# Patient Record
Sex: FEMALE | Race: WHITE | NOT HISPANIC OR LATINO | Employment: UNEMPLOYED | URBAN - METROPOLITAN AREA
[De-identification: names, ages, dates, MRNs, and addresses within clinical notes are randomized per-mention and may not be internally consistent; named-entity substitution may affect disease eponyms.]

---

## 2017-01-04 ENCOUNTER — ALLSCRIPTS OFFICE VISIT (OUTPATIENT)
Dept: OTHER | Facility: OTHER | Age: 67
End: 2017-01-04

## 2017-01-04 DIAGNOSIS — E78.00 PURE HYPERCHOLESTEROLEMIA: ICD-10-CM

## 2017-09-11 ENCOUNTER — GENERIC CONVERSION - ENCOUNTER (OUTPATIENT)
Dept: OTHER | Facility: OTHER | Age: 67
End: 2017-09-11

## 2018-01-02 ENCOUNTER — ALLSCRIPTS OFFICE VISIT (OUTPATIENT)
Dept: OTHER | Facility: OTHER | Age: 68
End: 2018-01-02

## 2018-01-03 NOTE — PROGRESS NOTES
Assessment   1  High cholesterol (272 0) (E78 00)   2  Benign essential hypertension (401 1) (I10)   3  Backache (724 5) (M54 9)   4  Pure hypercholesterolemia (272 0) (E78 00)   5  Insomnia (780 52) (G47 00)   6  Fatigue (780 79) (R53 83)   7  Localized papular rash (782 1) (R21)    Plan   Benign essential hypertension    · (1) BASIC METABOLIC PROFILE; Status:Active; Requested GRK:[id number];    · (1) CBC/ PLT (NO DIFF); Status:Active; Requested URX:[id number];    · (1) LIPID PANEL, FASTING; Status:Active; Requested QVT:[id number];    · (1) URINALYSIS (will reflex a microscopy if leukocytes, occult blood, protein or nitrites are not within    normal limits); Status:Active; Requested JPQ:[id number]; High cholesterol    · (1) HEPATIC FUNCTION PANEL; Status:Active; Requested VUA:[id number];    · (1) TSH; Status:Active; Requested OLS:[id number];    · Follow-up visit in 6 months Evaluation and Treatment  Follow-up  Status: Hold For - Scheduling     Requested for: 02Jan2018  Localized papular rash    · 1 - Todd MATIAS, Michelle Velasquez  (Dermatology) Co-Management  *  Status: Hold For - Scheduling  Requested    for: 63GFQ8250  Care Summary provided  : Yes    Discussion/Summary   Discussion Summary:    Hypertension: Very well control   rash: Erythematous papular rash not show worse etiology it may be but bite but is going on since October is not itchy not spreading side on think so  It is get worse with stress and sunlight maybe it is Rosea  I will refer her to get dermatologist for further evaluation   Did not have blood work which she will get it this week   of tiredness again etiology unclear I will wait for the blood work to come back before I make any other assessment   will see her back in 6 month  Counseling Documentation With Imm: The patient was counseled regarding diagnostic results,-- instructions for management,-- risk factor reductions,-- prognosis      Medication SE Review and Pt Understands Tx: Possible side effects of new medications were reviewed with the patient/guardian today  Chief Complaint   Chief Complaint Free Text Note Form: Patt Renteria came in today for comprehensive physical examination    Chief Complaint Chronic Condition [place] [facility]: Patient is here today for follow up of chronic conditions described in HPI  History of Present Illness   HPI: 48-year-old woman with stable medical problem hypertension and high cholesterol and back pain  She has had a rash on her face which is going on since October  He does get worse with the stress  Not eating not painful not spreading and it is only on face not any other part of the body  is also complaining of feeling quite tired with something new does not know the reason no change in her medication or anything else      Review of Systems   Complete-Female:      Constitutional: feeling tired  Eyes: No complaints of eye pain, no red eyes, no eyesight problems, no discharge, no dry eyes, no itching of eyes  ENT: no complaints of earache, no loss of hearing, no nose bleeds, no nasal discharge, no sore throat, no hoarseness  Cardiovascular: No complaints of slow heart rate, no fast heart rate, no chest pain, no palpitations, no leg claudication, no lower extremity edema  Respiratory: No complaints of shortness of breath, no wheezing, no cough, no SOB on exertion, no orthopnea, no PND  Gastrointestinal: No complaints of abdominal pain, no constipation, no nausea or vomiting, no diarrhea, no bloody stools  Genitourinary: No complaints of dysuria, no incontinence, no pelvic pain, no dysmenorrhea, no vaginal discharge or bleeding  Musculoskeletal: arthralgias-- and-- myalgias  Integumentary: No complaints of skin rash or lesions, no itching, no skin wounds, no breast pain or lump        Neurological: No complaints of headache, no confusion, no convulsions, no numbness, no dizziness or fainting, no tingling, no limb weakness, no difficulty walking  Psychiatric: Not suicidal, no sleep disturbance, no anxiety or depression, no change in personality, no emotional problems  Endocrine: No complaints of proptosis, no hot flashes, no muscle weakness, no deepening of the voice, no feelings of weakness  Hematologic/Lymphatic: No complaints of swollen glands, no swollen glands in the neck, does not bleed easily, does not bruise easily  ROS Reviewed:    ROS reviewed  Active Problems   1  Backache (724 5) (M54 9)   2  Benign essential hypertension (401 1) (I10)   3  Cough (786 2) (R05)   4  Depressive disorder (311) (F32 9)   5  Disorder of lung (518 89) (J98 4)   6  Dizziness (780 4) (R42)   7  High cholesterol (272 0) (E78 00)   8  Hip pain (719 45) (M25 559)   9  Insomnia (780 52) (G47 00)   10  Migraine (346 90) (G43 909)   11  Palpitations (785 1) (R00 2)   12  Pure hypercholesterolemia (272 0) (E78 00)   13  Viral disease (079 99) (B34 9)    Past Medical History   Active Problems And Past Medical History Reviewed: The active problems and past medical history were reviewed and updated today  Surgical History   1  History of Cholecystectomy   2  History of Tubal Ligation  Surgical History Reviewed: The surgical history was reviewed and updated today  Family History   Family History    1  Family history of Malignant neoplastic disease  Family History Reviewed: The family history was reviewed and updated today  Social History    · Advance directive information unavailable   · Does not exercise (V69 0) (Z72 3)   · Five children   ·    · Never a smoker   · Never used chewing tobacco (V49 89) (Z78 9)   · Non drinker / no alcohol use   · Occasional caffeine consumption   · Retired  Social History Reviewed: The social history was reviewed and updated today  Current Meds    1  Atorvastatin Calcium 40 MG Oral Tablet; TAKE 1 TABLET AT BEDTIME;      Therapy: 76KFT9923 to (Evaluate:18Mar2018)  Requested for: 84XDQ3621; Last Rx:[id number]     Ordered   2  PARoxetine HCl - 20 MG Oral Tablet; TAKE 1 TABLET DAILY  Requested for: 71Kvg4304; Last     Rx:[id number] Ordered   3  Tretinoin 0 05 % External Cream;     Therapy: (Recorded:14Jun2016) to Recorded   4  Zolpidem Tartrate 10 MG Oral Tablet; TAKE 1 TABLET AT BEDTIME AS NEEDED; Therapy: 42MAU8567 to (Evaluate:12Jan2018); Last Rx:13Nov2017 Ordered    Allergies   1  No Known Drug Allergies    Vitals   Vital Signs    Recorded: 02Jan2018 03:19PM   Heart Rate 80, Apical   Pulse Quality Normal, Apical   Respiration Quality Normal   Respiration 16   Systolic 229, LUE, Sitting   Diastolic 70, LUE, Sitting     Physical Exam        Constitutional      General appearance: No acute distress, well appearing and well nourished  Eyes      Conjunctiva and lids: No swelling, erythema or discharge  Pupils and irises: Equal, round and reactive to light  Ears, Nose, Mouth, and Throat      External inspection of ears and nose: Normal        Otoscopic examination: Tympanic membranes translucent with normal light reflex  Canals patent without erythema  Nasal mucosa, septum, and turbinates: Normal without edema or erythema  Oropharynx: Normal with no erythema, edema, exudate or lesions  Pulmonary      Respiratory effort: No increased work of breathing or signs of respiratory distress  Auscultation of lungs: Clear to auscultation  Cardiovascular      Auscultation of heart: Normal rate and rhythm, normal S1 and S2, without murmurs  Examination of extremities for edema and/or varicosities: Normal        Carotid pulses: Normal        Abdomen      Abdomen: Non-tender, no masses  Liver and spleen: No hepatomegaly or splenomegaly  Lymphatic      Palpation of lymph nodes in neck: No lymphadenopathy  Musculoskeletal      Gait and station: Normal        Digits and nails: Normal without clubbing or cyanosis         Skin      Skin and subcutaneous tissue: Abnormal   erythema  Neurologic      Cranial nerves: Cranial nerves 2-12 intact  Sensation: No sensory loss         Psychiatric      Orientation to person, place, and time: Normal        Mood and affect: Normal           Signatures    Electronically signed by : VAMSI Madsen ; Jan 2 2018  3:28PM EST                       (Author)

## 2018-01-15 VITALS
DIASTOLIC BLOOD PRESSURE: 80 MMHG | WEIGHT: 179.38 LBS | HEART RATE: 78 BPM | RESPIRATION RATE: 16 BRPM | SYSTOLIC BLOOD PRESSURE: 140 MMHG | BODY MASS INDEX: 27.19 KG/M2 | HEIGHT: 68 IN | TEMPERATURE: 98 F

## 2018-01-15 NOTE — MISCELLANEOUS
Message  a refill was sent to [facility] pharmacy for Zolpidem tartrate 10 mg oral tabs    cp 9/11/2017      Active Problems    1  Backache (724 5) (M54 9)   2  Benign essential hypertension (401 1) (I10)   3  Cough (786 2) (R05)   4  Depressive disorder (311) (F32 9)   5  Disorder of lung (518 89) (J98 4)   6  Dizziness (780 4) (R42)   7  Hip pain (719 45) (M25 559)   8  Insomnia (780 52) (G47 00)   9  Migraine (346 90) (G43 909)   10  Palpitations (785 1) (R00 2)   11  Pure hypercholesterolemia (272 0) (E78 00)   12  Viral disease (079 99) (B34 9)    Current Meds   1  Atorvastatin Calcium 20 MG Oral Tablet; Take 1 tablet daily by mouth at night; Therapy: 42GZX0416 to (Evaluate:28Mar2017)  Requested for: 45Oyl7554; Last   Rx:00Ami7919 Ordered   2  Atorvastatin Calcium 40 MG Oral Tablet; TAKE 1 TABLET AT BEDTIME; Therapy: 85PAU2356 to (Evaluate:09Egm0747)  Requested for: 65WHB2527; Last   Rx:05Jun2017 Ordered   3  PARoxetine HCl - 20 MG Oral Tablet; TAKE 1 TABLET DAILY  Requested for: 46Giz1201;   Last Rx:98Tog2023 Ordered   4  Tretinoin 0 05 % External Cream;   Therapy: (Recorded:14Jun2016) to Recorded   5  Zolpidem Tartrate 10 MG Oral Tablet; TAKE 1 TABLET AT BEDTIME AS NEEDED; Therapy: 38OMT5412 to (Evaluate:07Nov2017); Last Rx:25Coi8255 Ordered   6  Zolpidem Tartrate ER 12 5 MG Oral Tablet Extended Release; TAKE 1 TABLET AT   BEDTIME AS NEEDED FOR SLEEP; Therapy: 21Iot2769 to (Evaluate:78Vzm7458); Last Rx:34Sle7977 Ordered    Allergies    1   No Known Drug Allergies    Signatures   Electronically signed by : VAMSI Sr ; Sep 11 2017  9:26AM EST                       (Author)

## 2018-01-22 VITALS — RESPIRATION RATE: 16 BRPM | DIASTOLIC BLOOD PRESSURE: 70 MMHG | SYSTOLIC BLOOD PRESSURE: 140 MMHG | HEART RATE: 80 BPM

## 2018-02-27 DIAGNOSIS — F32.A DEPRESSION, UNSPECIFIED DEPRESSION TYPE: Primary | ICD-10-CM

## 2018-02-28 RX ORDER — PAROXETINE HYDROCHLORIDE 20 MG/1
1 TABLET, FILM COATED ORAL DAILY
COMMUNITY
End: 2018-02-28 | Stop reason: SDUPTHER

## 2018-02-28 RX ORDER — ZOLPIDEM TARTRATE 12.5 MG/1
1 TABLET, FILM COATED, EXTENDED RELEASE ORAL
COMMUNITY
Start: 2016-04-14 | End: 2018-03-09

## 2018-02-28 RX ORDER — ATORVASTATIN CALCIUM 20 MG/1
TABLET, FILM COATED ORAL
COMMUNITY
Start: 2016-06-15 | End: 2018-07-11 | Stop reason: DRUGHIGH

## 2018-02-28 RX ORDER — ZOLPIDEM TARTRATE 10 MG/1
1 TABLET ORAL
COMMUNITY
Start: 2017-07-12 | End: 2018-03-09 | Stop reason: SDUPTHER

## 2018-02-28 RX ORDER — TRETINOIN 0.5 MG/G
CREAM TOPICAL
COMMUNITY
End: 2018-07-11 | Stop reason: ALTCHOICE

## 2018-02-28 RX ORDER — PAROXETINE HYDROCHLORIDE 20 MG/1
20 TABLET, FILM COATED ORAL DAILY
Qty: 90 TABLET | Refills: 2 | Status: SHIPPED | OUTPATIENT
Start: 2018-02-28 | End: 2018-03-08 | Stop reason: SDUPTHER

## 2018-02-28 RX ORDER — ATORVASTATIN CALCIUM 40 MG/1
1 TABLET, FILM COATED ORAL
COMMUNITY
Start: 2017-01-04 | End: 2018-03-22 | Stop reason: SDUPTHER

## 2018-03-08 DIAGNOSIS — F32.A DEPRESSION, UNSPECIFIED DEPRESSION TYPE: ICD-10-CM

## 2018-03-09 DIAGNOSIS — G47.00 INSOMNIA, UNSPECIFIED TYPE: Primary | ICD-10-CM

## 2018-03-09 RX ORDER — PAROXETINE HYDROCHLORIDE 20 MG/1
20 TABLET, FILM COATED ORAL DAILY
Qty: 30 TABLET | Refills: 5 | Status: SHIPPED | OUTPATIENT
Start: 2018-03-09 | End: 2018-08-31 | Stop reason: SDUPTHER

## 2018-03-09 RX ORDER — ZOLPIDEM TARTRATE 10 MG/1
10 TABLET ORAL
Qty: 60 TABLET | Refills: 0 | Status: SHIPPED | OUTPATIENT
Start: 2018-03-09 | End: 2018-05-10 | Stop reason: SDUPTHER

## 2018-03-22 DIAGNOSIS — E78.00 HIGH CHOLESTEROL: Primary | ICD-10-CM

## 2018-03-22 RX ORDER — ATORVASTATIN CALCIUM 40 MG/1
40 TABLET, FILM COATED ORAL
Qty: 90 TABLET | Refills: 0 | Status: SHIPPED | OUTPATIENT
Start: 2018-03-22 | End: 2018-06-22 | Stop reason: SDUPTHER

## 2018-04-16 ENCOUNTER — OFFICE VISIT (OUTPATIENT)
Dept: DERMATOLOGY | Facility: CLINIC | Age: 68
End: 2018-04-16
Payer: COMMERCIAL

## 2018-04-16 DIAGNOSIS — Z13.89 SCREENING FOR SKIN CONDITION: ICD-10-CM

## 2018-04-16 DIAGNOSIS — L82.1 SEBORRHEIC KERATOSIS: ICD-10-CM

## 2018-04-16 DIAGNOSIS — L71.9 ROSACEA: Primary | ICD-10-CM

## 2018-04-16 PROCEDURE — 99203 OFFICE O/P NEW LOW 30 MIN: CPT | Performed by: DERMATOLOGY

## 2018-04-16 RX ORDER — METRONIDAZOLE 7.5 MG/G
GEL TOPICAL DAILY
Qty: 45 G | Refills: 3 | Status: SHIPPED | OUTPATIENT
Start: 2018-04-16 | End: 2019-03-17 | Stop reason: SDUPTHER

## 2018-04-16 NOTE — PROGRESS NOTES
[address] [facility] [address] [facility] DERMATOLOGY  [address] [place] [address]     MRN: [id number] : 1950  Encounter: [id number]  Patient Information: Lea Ugalde  Chief complaint: facial rash for several months    History of present illness:  41-year-old female with previous history of actinic keratosis presents for overall checkup concerned regarding a rash that has been present for several months patient has tried some desonide cream without any improvement  Past Medical History:   Diagnosis Date    Hyperlipemia      History reviewed  No pertinent surgical history  Social History   History   Alcohol Use No     History   Drug Use No     History   Smoking Status    Never Smoker   Smokeless Tobacco    Never Used     Family History   Problem Relation Age of Onset    Cancer Mother     Cancer Father      Meds/Allergies   No Known Allergies    Meds:  Prior to Admission medications    Medication Sig Start Date End Date Taking?  Authorizing Provider   atorvastatin (LIPITOR) 40 mg tablet Take 1 tablet (40 mg total) by mouth daily at bedtime 3/22/18  Yes Sidra Robb MD   PARoxetine (PAXIL) 20 mg tablet Take 1 tablet (20 mg total) by mouth daily 3/9/18  Yes Sidra Robb MD   zolpidem (AMBIEN) 10 mg tablet Take 1 tablet (10 mg total) by mouth daily at bedtime as needed for sleep for up to 60 days 3/9/18 5/8/18 Yes Sidra Robb MD   atorvastatin (LIPITOR) 20 mg tablet Take by mouth 6/15/16   Historical Provider, MD   tretinoin (REFISSA) 0 05 % cream Apply topically    Historical Provider, MD       Subjective:     Review of Systems:    General: negative for - chills, fatigue, fever,  weight gain or weight loss  Psychological: negative for - anxiety, behavioral disorder, concentration difficulties, decreased libido, depression, irritability, memory difficulties, mood swings, sleep disturbances or suicidal ideation  ENT: negative for - hearing difficulties , nasal congestion, nasal discharge, oral lesions, sinus pain, sneezing, sore throat  Allergy and Immunology: negative for - hives, insect bite sensitivity,  Hematological and Lymphatic: negative for - bleeding problems, blood clots,bruising, swollen lymph nodes  Endocrine: negative for - hair pattern changes, hot flashes, malaise/lethargy, mood swings, palpitations, polydipsia/polyuria, skin changes, temperature intolerance or unexpected weight change  Respiratory: negative for - cough, hemoptysis, orthopnea, shortness of breath, or wheezing  Cardiovascular: negative for - chest pain, dyspnea on exertion, edema,  Gastrointestinal: negative for - abdominal pain, nausea/vomiting  Genito-Urinary: negative for - dysuria, incontinence, irregular/heavy menses or urinary frequency/urgency  Musculoskeletal: negative for - gait disturbance, joint pain, joint stiffness, joint swelling, muscle pain, muscular weakness  Dermatological:  As in HPI  Neurological: negative for confusion, dizziness, headaches, impaired coordination/balance, memory loss, numbness/tingling, seizures, speech problems, tremors or weakness       Objective: There were no vitals taken for this visit  Physical Exam:    General Appearance:    Alert, cooperative, no distress   Head:    Normocephalic, without obvious abnormality, atraumatic           Skin:   A full skin exam was performed including scalp, head scalp, eyes, ears, nose, lips, neck, chest, axilla, abdomen, back, buttocks, bilateral upper extremities, bilateral lower extremities, hands, feet, fingers, toes, fingernails, and toenails  Erythema papules some pustules noted on the face normal keratotic papules with greasy stuck on appearance nothing else atypical noted on complete exam     Assessment:     1  Rosacea     2  Seborrheic keratosis     3   Screening for skin condition           Plan:   Rosacea discussed the concept of this inflammatory disorder will go ahead treat with MetroGel daily and recheck in about 2-3 months   seborrheic keratosis patient reassured these are normal growths we acquire with age no treatment needed  Screening for Dermatologic Disorders: Nothing else of concern noted on complete exam follow up in 1 year     Primitivo Cohen MD  4/16/2018,12:36 PM    Portions of the record may have been created with voice recognition software   Occasional wrong word or "sound a like" substitutions may have occurred due to the inherent limitations of voice recognition software   Read the chart carefully and recognize, using context, where substitutions have occurred

## 2018-04-16 NOTE — PATIENT INSTRUCTIONS
Rosacea discussed the concept of this inflammatory disorder will go ahead treat with MetroGel daily and recheck in about 2-3 months   seborrheic keratosis patient reassured these are normal growths we acquire with age no treatment needed  Screening for Dermatologic Disorders: Nothing else of concern noted on complete exam follow up in 1 year     Rosacea   WHAT YOU NEED TO KNOW:   What is rosacea? Rosacea is a long-term skin condition that causes inflammation of your face  Rosacea usually affects the cheeks and chin  It may also affect the nose, forehead, and eyes  There is no known cause of rosacea  Healthcare providers believe the facial redness occurs when blood vessels in your face widen  Rosacea is more common in women, people with fair skin, and those aged 27 or older  What are the signs and symptoms of rosacea? Your face will be red with red bumps  The bumps often look like acne pimples  Symptoms range from mild to severe  You may have periods of time with no symptoms  When you have symptoms, this is called a flare  Some signs and symptoms will depend on the type of rosacea you have  The 4 types of rosacea are:  · Erythematotelangiectatic:  This is also called ETR  Your face may be red and swollen for long periods  Your skin may burn, itch, or sting more easily when you use creams or lotions on your face  Your skin may also be drier than with other types of rosacea  · Papulopustular: Your skin may swell, burn, or sting  The bumps on your skin may be filled with pus  · Phymatous: This type causes your skin on your nose, chin, forehead, cheeks, eyelids, or ears to thicken  Your nose may also look bumpy  · Ocular: This type of rosacea affects your eyes  You may have dry or watery, red eyes  Your eyes may burn, sting, or itch  You may have eyelid swelling or feel like you have something in your eye  You may also have blurry vision  Your eyes may hurt when you are in bright light    What increases my risk for rosacea flares? · Heavy exercise    · Hot drinks or drinks that contain alcohol    · Spicy foods    · Stress, sudden laughter, or embarrassment    · Sun exposure    · Very hot, cold, or windy weather  How is rosacea diagnosed? Your healthcare provider will ask about your signs and symptoms  He may know you have rosacea by looking at your skin  Your healthcare provider may also do blood tests to learn if another medical condition is the cause of your symptoms  How is rosacea treated? Rosacea has no cure  With treatment, your symptoms can be controlled  Ask your healthcare provider for more information about the following treatments:  · Antibiotic medicines: This medicine is given to treat or prevent infection  Antibiotics may also help decrease swelling, redness, and acne-like bumps  This medicine may be given as a pill or a cream to apply on your face  · Artificial tears: These help keep your eyes moist if you have dryness from ocular rosacea  · Laser ablation:  A laser removes tissue buildup and reduces redness and swelling  · Surgery: You may need surgery to remove thickened skin on your face  How can I prevent a rosacea flare? · Avoid hot drinks or drinks that contain alcohol  · Avoid being in the sun for long periods of time  Use sunscreen that is SPF 15 or higher every time you go outside  Wear a wide-brimmed hat while you are outdoors  · Avoid skin care products that have alcohol, menthol, or salt in them  Use fragrance-free products to wash your face  Be gentle when you wash your face to avoid irritation  Ask your healthcare provider which products are best to treat dry skin  · Clean your eyelids as directed  Your healthcare provider may suggest you put warm compresses on your eyes 2 times each day  When should I contact my healthcare provider? · You have new or worse eye redness or itching  · You feel depressed about the look of your skin       · You have questions about your condition or care  When should I seek immediate care or call 911? · You have new or increased blurry vision, or you have vision loss  CARE AGREEMENT:   You have the right to help plan your care  Learn about your health condition and how it may be treated  Discuss treatment options with your caregivers to decide what care you want to receive  You always have the right to refuse treatment  The above information is an  only  It is not intended as medical advice for individual conditions or treatments  Talk to your doctor, nurse or pharmacist before following any medical regimen to see if it is safe and effective for you  © 2017 [facility] Information is for End User's use only and may not be sold, redistributed or otherwise used for commercial purposes  All illustrations and images included in CareNotes® are the copyrighted property of [employer]  or [address]

## 2018-04-26 ENCOUNTER — TELEPHONE (OUTPATIENT)
Dept: NEPHROLOGY | Facility: CLINIC | Age: 68
End: 2018-04-26

## 2018-04-26 DIAGNOSIS — J06.9 URTI (ACUTE UPPER RESPIRATORY INFECTION): Primary | ICD-10-CM

## 2018-04-26 RX ORDER — AZITHROMYCIN 250 MG/1
TABLET, FILM COATED ORAL
Qty: 6 TABLET | Refills: 0 | Status: SHIPPED | OUTPATIENT
Start: 2018-04-26 | End: 2018-04-30

## 2018-05-10 DIAGNOSIS — G47.00 INSOMNIA, UNSPECIFIED TYPE: ICD-10-CM

## 2018-05-10 RX ORDER — ZOLPIDEM TARTRATE 10 MG/1
10 TABLET ORAL
Qty: 60 TABLET | Refills: 0 | OUTPATIENT
Start: 2018-05-10 | End: 2018-07-09

## 2018-05-14 RX ORDER — ZOLPIDEM TARTRATE 10 MG/1
10 TABLET ORAL
Qty: 60 TABLET | Refills: 0 | Status: SHIPPED | OUTPATIENT
Start: 2018-05-14 | End: 2018-07-11 | Stop reason: SDUPTHER

## 2018-06-22 DIAGNOSIS — E78.00 HIGH CHOLESTEROL: ICD-10-CM

## 2018-06-22 RX ORDER — ATORVASTATIN CALCIUM 40 MG/1
40 TABLET, FILM COATED ORAL
Qty: 90 TABLET | Refills: 0 | Status: SHIPPED | OUTPATIENT
Start: 2018-06-22 | End: 2018-09-27 | Stop reason: SDUPTHER

## 2018-07-09 ENCOUNTER — TRANSCRIBE ORDERS (OUTPATIENT)
Dept: LAB | Facility: CLINIC | Age: 68
End: 2018-07-09

## 2018-07-09 ENCOUNTER — APPOINTMENT (OUTPATIENT)
Dept: LAB | Facility: CLINIC | Age: 68
End: 2018-07-09
Payer: COMMERCIAL

## 2018-07-09 DIAGNOSIS — E78.5 HYPERLIPIDEMIA, UNSPECIFIED HYPERLIPIDEMIA TYPE: ICD-10-CM

## 2018-07-09 DIAGNOSIS — I10 ESSENTIAL HYPERTENSION, MALIGNANT: ICD-10-CM

## 2018-07-09 DIAGNOSIS — E78.5 HYPERLIPIDEMIA, UNSPECIFIED HYPERLIPIDEMIA TYPE: Primary | ICD-10-CM

## 2018-07-09 LAB
ALBUMIN SERPL BCP-MCNC: 3.7 G/DL (ref 3.5–5)
ALP SERPL-CCNC: 87 U/L (ref 46–116)
ALT SERPL W P-5'-P-CCNC: 19 U/L (ref 12–78)
ANION GAP SERPL CALCULATED.3IONS-SCNC: 7 MMOL/L (ref 4–13)
AST SERPL W P-5'-P-CCNC: 17 U/L (ref 5–45)
BILIRUB DIRECT SERPL-MCNC: 0.14 MG/DL (ref 0–0.2)
BILIRUB SERPL-MCNC: 0.56 MG/DL (ref 0.2–1)
BUN SERPL-MCNC: 7 MG/DL (ref 5–25)
CALCIUM SERPL-MCNC: 8.8 MG/DL (ref 8.3–10.1)
CHLORIDE SERPL-SCNC: 105 MMOL/L (ref 100–108)
CHOLEST SERPL-MCNC: 216 MG/DL (ref 50–200)
CO2 SERPL-SCNC: 27 MMOL/L (ref 21–32)
CREAT SERPL-MCNC: 0.98 MG/DL (ref 0.6–1.3)
ERYTHROCYTE [DISTWIDTH] IN BLOOD BY AUTOMATED COUNT: 14.1 % (ref 11.6–15.1)
GFR SERPL CREATININE-BSD FRML MDRD: 59 ML/MIN/1.73SQ M
GLUCOSE P FAST SERPL-MCNC: 106 MG/DL (ref 65–99)
HCT VFR BLD AUTO: 43.4 % (ref 34.8–46.1)
HDLC SERPL-MCNC: 52 MG/DL (ref 40–60)
HGB BLD-MCNC: 13.5 G/DL (ref 11.5–15.4)
LDLC SERPL CALC-MCNC: 137 MG/DL (ref 0–100)
MCH RBC QN AUTO: 26.6 PG (ref 26.8–34.3)
MCHC RBC AUTO-ENTMCNC: 31.1 G/DL (ref 31.4–37.4)
MCV RBC AUTO: 86 FL (ref 82–98)
NONHDLC SERPL-MCNC: 164 MG/DL
PLATELET # BLD AUTO: 252 THOUSANDS/UL (ref 149–390)
PMV BLD AUTO: 11.8 FL (ref 8.9–12.7)
POTASSIUM SERPL-SCNC: 4.5 MMOL/L (ref 3.5–5.3)
PROT SERPL-MCNC: 7.8 G/DL (ref 6.4–8.2)
RBC # BLD AUTO: 5.07 MILLION/UL (ref 3.81–5.12)
SODIUM SERPL-SCNC: 139 MMOL/L (ref 136–145)
TRIGL SERPL-MCNC: 136 MG/DL
TSH SERPL DL<=0.05 MIU/L-ACNC: 3.42 UIU/ML (ref 0.36–3.74)
WBC # BLD AUTO: 6.36 THOUSAND/UL (ref 4.31–10.16)

## 2018-07-09 PROCEDURE — 80061 LIPID PANEL: CPT

## 2018-07-09 PROCEDURE — 85027 COMPLETE CBC AUTOMATED: CPT

## 2018-07-09 PROCEDURE — 84443 ASSAY THYROID STIM HORMONE: CPT

## 2018-07-09 PROCEDURE — 36415 COLL VENOUS BLD VENIPUNCTURE: CPT

## 2018-07-09 PROCEDURE — 80048 BASIC METABOLIC PNL TOTAL CA: CPT

## 2018-07-09 PROCEDURE — 80076 HEPATIC FUNCTION PANEL: CPT

## 2018-07-11 ENCOUNTER — OFFICE VISIT (OUTPATIENT)
Dept: NEPHROLOGY | Facility: CLINIC | Age: 68
End: 2018-07-11
Payer: COMMERCIAL

## 2018-07-11 DIAGNOSIS — E78.00 HIGH CHOLESTEROL: ICD-10-CM

## 2018-07-11 DIAGNOSIS — F32.A DEPRESSIVE DISORDER: ICD-10-CM

## 2018-07-11 DIAGNOSIS — I10 BENIGN ESSENTIAL HYPERTENSION: Primary | ICD-10-CM

## 2018-07-11 DIAGNOSIS — G47.00 INSOMNIA, UNSPECIFIED TYPE: ICD-10-CM

## 2018-07-11 PROCEDURE — 99214 OFFICE O/P EST MOD 30 MIN: CPT | Performed by: INTERNAL MEDICINE

## 2018-07-11 RX ORDER — ZOLPIDEM TARTRATE 10 MG/1
10 TABLET ORAL
Qty: 60 TABLET | Refills: 0 | Status: SHIPPED | OUTPATIENT
Start: 2018-07-11 | End: 2018-09-07 | Stop reason: SDUPTHER

## 2018-07-11 NOTE — PROGRESS NOTES
INTERNAL MEDICINE OFFICE FOLLOW UP  Lorri Alejandra 75 y o  female MRN: [id number]    Encounter: [id number] 7/11/2018    REASON FOR VISIT: Lorri Alejandra is a 75 y o  female who is here on 7/11/2018 for Follow-up    HPI:    Gardenia Osei came in today for comprehensive medical examination  18-year-old woman with history of high cholesterol chronic back pain and leg pain and insomnia  She is doing well overall  She does not like any screening testing to be done including Pap testing mammogram and colonoscopy  She is taking medicine regularly  Only complaint she has is insomnia and leg cramping at night        REVIEW OF SYSTEMS:    Review of Systems   Constitutional: Negative for activity change and fatigue  HENT: Negative for congestion and ear discharge  Eyes: Negative for photophobia and pain  Respiratory: Negative for apnea, choking, chest tightness, shortness of breath and wheezing  Cardiovascular: Negative for chest pain, palpitations and leg swelling  Gastrointestinal: Negative for abdominal distention and blood in stool  Endocrine: Negative for heat intolerance and polyphagia  Genitourinary: Negative for flank pain and urgency  Musculoskeletal: Positive for arthralgias and myalgias  Negative for neck pain and neck stiffness  Skin: Negative for color change and wound  Allergic/Immunologic: Negative for food allergies and immunocompromised state  Neurological: Positive for numbness  Negative for seizures and facial asymmetry  Hematological: Negative for adenopathy  Does not bruise/bleed easily  Psychiatric/Behavioral: Positive for sleep disturbance  Negative for self-injury and suicidal ideas  PAST MEDICAL HISTORY:  Past Medical History:   Diagnosis Date    Depression     Hyperlipemia     Insomnia        PAST SURGICAL HISTORY:  No past surgical history on file      SOCIAL HISTORY:  History   Alcohol Use No     History   Drug Use No     History   Smoking Status  Never Smoker   Smokeless Tobacco    Never Used       FAMILY HISTORY:  Family History   Problem Relation Age of Onset   Amie Ness Cancer Mother     Cancer Father        MEDICATIONS:    Current Outpatient Prescriptions:     atorvastatin (LIPITOR) 40 mg tablet, Take 1 tablet (40 mg total) by mouth daily at bedtime, Disp: 90 tablet, Rfl: 0    metroNIDAZOLE (METROGEL) 0 75 % gel, Apply topically daily To entire face, Disp: 45 g, Rfl: 3    PARoxetine (PAXIL) 20 mg tablet, Take 1 tablet (20 mg total) by mouth daily, Disp: 30 tablet, Rfl: 5    zolpidem (AMBIEN) 10 mg tablet, Take 1 tablet (10 mg total) by mouth daily at bedtime as needed for sleep for up to 60 days, Disp: 60 tablet, Rfl: 0    PHYSICAL EXAM:  There were no vitals filed for this visit  There is no height or weight on file to calculate BMI  Physical Exam   Constitutional: She is oriented to person, place, and time  She appears well-developed and well-nourished  No distress  HENT:   Head: Normocephalic and atraumatic  Right Ear: External ear normal    Left Ear: External ear normal    Mouth/Throat: Oropharynx is clear and moist    Eyes: Conjunctivae and EOM are normal  Pupils are equal, round, and reactive to light  No scleral icterus  Neck: Normal range of motion  Neck supple  No JVD present  Cardiovascular: Normal rate, regular rhythm, normal heart sounds and intact distal pulses  No murmur heard  Pulmonary/Chest: Effort normal and breath sounds normal  No respiratory distress  She has no wheezes  She has no rales  Abdominal: Soft  Bowel sounds are normal  She exhibits no distension and no mass  There is no tenderness  Musculoskeletal: Normal range of motion  She exhibits no edema  Neurological: She is alert and oriented to person, place, and time  Skin: Skin is warm  No rash noted  Psychiatric: She has a normal mood and affect   Her behavior is normal        LAB RESULTS:  Results for orders placed or performed in visit on 07/09/18 Basic metabolic panel   Result Value Ref Range    Sodium 139 136 - 145 mmol/L    Potassium 4 5 3 5 - 5 3 mmol/L    Chloride 105 100 - 108 mmol/L    CO2 27 21 - 32 mmol/L    Anion Gap 7 4 - 13 mmol/L    BUN 7 5 - 25 mg/dL    Creatinine 0 98 0 60 - 1 30 mg/dL    Glucose, Fasting 106 (H) 65 - 99 mg/dL    Calcium 8 8 8 3 - 10 1 mg/dL    eGFR 59 ml/min/1 73sq m   CBC and Platelet   Result Value Ref Range    WBC 6 36 4 31 - 10 16 Thousand/uL    RBC 5 07 3 81 - 5 12 Million/uL    Hemoglobin 13 5 11 5 - 15 4 g/dL    Hematocrit 43 4 34 8 - 46 1 %    MCV 86 82 - 98 fL    MCH 26 6 (L) 26 8 - 34 3 pg    MCHC 31 1 (L) 31 4 - 37 4 g/dL    RDW 14 1 11 6 - 15 1 %    Platelets 382 047 - 024 Thousands/uL    MPV 11 8 8 9 - 12 7 fL   Lipid panel   Result Value Ref Range    Cholesterol 216 (H) 50 - 200 mg/dL    Triglycerides 136 <=150 mg/dL    HDL, Direct 52 40 - 60 mg/dL    LDL Calculated 137 (H) 0 - 100 mg/dL    Non-HDL-Chol (CHOL-HDL) 164 mg/dl   Hepatic function panel   Result Value Ref Range    Total Bilirubin 0 56 0 20 - 1 00 mg/dL    Bilirubin, Direct 0 14 0 00 - 0 20 mg/dL    Alkaline Phosphatase 87 46 - 116 U/L    AST 17 5 - 45 U/L    ALT 19 12 - 78 U/L    Total Protein 7 8 6 4 - 8 2 g/dL    Albumin 3 7 3 5 - 5 0 g/dL   TSH, 3rd generation   Result Value Ref Range    TSH 3RD GENERATON 3 420 0 358 - 3 740 uIU/mL       ASSESSMENT and PLAN:  Benign essential hypertension  Very well controlled without any medication with diet and exercise    High cholesterol  She is taking medicine and it is improving    Insomnia  She is on Ambien  I discuss side effect with her many times but she still wants it as she cannot sleep without that and she is tolerating well so far    Depressive disorder  Stable with medication    I will see her back in 1 year again for comprehensive physical examination  Screening testing discussed with her and she still does not want it    I discussed her blood work with her and also side effect of medicine with her will get blood test again before her next visit        Portions of the record may have been created with voice recognition software  Occasional wrong word or "sound a like" substitutions may have occurred due to the inherent limitations of voice recognition software  Read the chart carefully and recognize, using context, where substitutions have occurred  If you have any questions, please contact the dictating provider

## 2018-07-11 NOTE — ASSESSMENT & PLAN NOTE
She is on Ambien    I discuss side effect with her many times but she still wants it as she cannot sleep without that and she is tolerating well so far

## 2018-08-31 DIAGNOSIS — F32.A DEPRESSION, UNSPECIFIED DEPRESSION TYPE: ICD-10-CM

## 2018-08-31 RX ORDER — PAROXETINE HYDROCHLORIDE 20 MG/1
20 TABLET, FILM COATED ORAL DAILY
Qty: 90 TABLET | Refills: 1 | Status: SHIPPED | OUTPATIENT
Start: 2018-08-31 | End: 2018-09-04 | Stop reason: SDUPTHER

## 2018-09-04 DIAGNOSIS — F32.A DEPRESSION, UNSPECIFIED DEPRESSION TYPE: ICD-10-CM

## 2018-09-04 RX ORDER — PAROXETINE HYDROCHLORIDE 20 MG/1
20 TABLET, FILM COATED ORAL DAILY
Qty: 90 TABLET | Refills: 1 | Status: SHIPPED | OUTPATIENT
Start: 2018-09-04 | End: 2019-09-23 | Stop reason: SDUPTHER

## 2018-09-07 DIAGNOSIS — G47.00 INSOMNIA, UNSPECIFIED TYPE: ICD-10-CM

## 2018-09-07 RX ORDER — ZOLPIDEM TARTRATE 10 MG/1
10 TABLET ORAL
Qty: 60 TABLET | Refills: 0 | Status: SHIPPED | OUTPATIENT
Start: 2018-09-07 | End: 2018-11-02 | Stop reason: SDUPTHER

## 2018-09-07 NOTE — TELEPHONE ENCOUNTER
Per Dr Louise Arnett and spoke to Sophia and told her that her RX was ready for her  to pickup  Sophia understood and was OK with it

## 2018-09-07 NOTE — TELEPHONE ENCOUNTER
Avery Sainz called stating that she needs a refill for her Zolpidem 10mg @ BEDTIME w/90 qu  Please call Faith Beasley when Yu Del Rosario is ready, because her  will come pick it up

## 2018-09-27 DIAGNOSIS — E78.00 HIGH CHOLESTEROL: ICD-10-CM

## 2018-09-28 RX ORDER — ATORVASTATIN CALCIUM 40 MG/1
40 TABLET, FILM COATED ORAL
Qty: 90 TABLET | Refills: 0 | Status: SHIPPED | OUTPATIENT
Start: 2018-09-28 | End: 2018-12-31 | Stop reason: SDUPTHER

## 2018-11-02 DIAGNOSIS — G47.00 INSOMNIA, UNSPECIFIED TYPE: ICD-10-CM

## 2018-11-02 RX ORDER — ZOLPIDEM TARTRATE 10 MG/1
10 TABLET ORAL
Qty: 60 TABLET | Refills: 0 | Status: SHIPPED | OUTPATIENT
Start: 2018-11-02 | End: 2019-01-03 | Stop reason: SDUPTHER

## 2018-11-02 NOTE — TELEPHONE ENCOUNTER
Leonardo Senior called stating that she needs a refill for her Zolpidem 10mg 1 @ bedtime  Please call Kalina Sylvester @ [phone] when Jack Mcfarlane is ready for

## 2018-11-13 ENCOUNTER — TELEPHONE (OUTPATIENT)
Dept: NEPHROLOGY | Facility: CLINIC | Age: 68
End: 2018-11-13

## 2018-11-13 DIAGNOSIS — J32.9 SINUSITIS, UNSPECIFIED CHRONICITY, UNSPECIFIED LOCATION: Primary | ICD-10-CM

## 2018-11-13 RX ORDER — AZITHROMYCIN 250 MG/1
TABLET, FILM COATED ORAL
Qty: 6 TABLET | Refills: 0 | Status: SHIPPED | OUTPATIENT
Start: 2018-11-13 | End: 2018-11-17

## 2018-11-13 NOTE — TELEPHONE ENCOUNTER
Wally Jonas called and said she has a cough for a while but now it is getting worse chest congestion, spitting up whitish/yellowish mucus and it hurts a lot when she cough, no fever, just chills

## 2018-12-31 DIAGNOSIS — E78.00 HIGH CHOLESTEROL: ICD-10-CM

## 2018-12-31 RX ORDER — ATORVASTATIN CALCIUM 40 MG/1
40 TABLET, FILM COATED ORAL
Qty: 90 TABLET | Refills: 0 | Status: SHIPPED | OUTPATIENT
Start: 2018-12-31 | End: 2019-04-23 | Stop reason: SDUPTHER

## 2019-01-03 DIAGNOSIS — G47.00 INSOMNIA, UNSPECIFIED TYPE: ICD-10-CM

## 2019-01-03 RX ORDER — ZOLPIDEM TARTRATE 10 MG/1
10 TABLET ORAL
Qty: 60 TABLET | Refills: 0 | Status: SHIPPED | OUTPATIENT
Start: 2019-01-03 | End: 2019-01-04 | Stop reason: SDUPTHER

## 2019-01-03 NOTE — TELEPHONE ENCOUNTER
Patient called stating that she needs a refill for her Zolpidem 10mg 1 at bedtime  Please call Robbin Avila @ [phone] when Elif Mcgarry is ready for pickup  Patient's  will be in to pickup RX

## 2019-01-04 DIAGNOSIS — G47.00 INSOMNIA, UNSPECIFIED TYPE: ICD-10-CM

## 2019-01-04 RX ORDER — ZOLPIDEM TARTRATE 10 MG/1
10 TABLET ORAL
Qty: 60 TABLET | Refills: 0 | Status: SHIPPED | OUTPATIENT
Start: 2019-01-04 | End: 2019-03-01 | Stop reason: SDUPTHER

## 2019-03-01 DIAGNOSIS — G47.00 INSOMNIA, UNSPECIFIED TYPE: ICD-10-CM

## 2019-03-01 RX ORDER — ZOLPIDEM TARTRATE 10 MG/1
10 TABLET ORAL
Qty: 60 TABLET | Refills: 5 | Status: SHIPPED | OUTPATIENT
Start: 2019-03-01 | End: 2019-03-05 | Stop reason: SDUPTHER

## 2019-03-04 ENCOUNTER — TELEPHONE (OUTPATIENT)
Dept: NEPHROLOGY | Facility: CLINIC | Age: 69
End: 2019-03-04

## 2019-03-04 NOTE — TELEPHONE ENCOUNTER
Patient called and was looking to see if the Rx for her Zolpidem 10mg (Insomnia) was ready for pick  Can Rx be sent electronically to [facility] @ [phone] for the patient, instead of coming to the office and picking up a hard copy  Please call patient @ [phone] if Rx can be sent electronically

## 2019-03-04 NOTE — TELEPHONE ENCOUNTER
I am pretty sure that can't be e-prescribed, Dr Jamil Templeton said he was stopping into the office today, can you check with him please?

## 2019-03-05 ENCOUNTER — TELEPHONE (OUTPATIENT)
Dept: NEPHROLOGY | Facility: CLINIC | Age: 69
End: 2019-03-05

## 2019-03-05 DIAGNOSIS — G47.00 INSOMNIA, UNSPECIFIED TYPE: ICD-10-CM

## 2019-03-05 RX ORDER — ZOLPIDEM TARTRATE 10 MG/1
10 TABLET ORAL
Qty: 60 TABLET | Refills: 0 | Status: SHIPPED | OUTPATIENT
Start: 2019-03-05 | End: 2019-03-05 | Stop reason: SDUPTHER

## 2019-03-05 RX ORDER — ZOLPIDEM TARTRATE 10 MG/1
10 TABLET ORAL
Qty: 60 TABLET | Refills: 3 | Status: SHIPPED | OUTPATIENT
Start: 2019-03-05 | End: 2019-04-30 | Stop reason: SDUPTHER

## 2019-03-05 NOTE — TELEPHONE ENCOUNTER
Rx was printed out and signed by Dr Seth Spivey  I did call the patient and told her that the Rx was ready for pickup  I also told the patient that the Rx would be at the  with Emily  The patient understood and was okay with it

## 2019-03-05 NOTE — TELEPHONE ENCOUNTER
Dr Wilder Roman, Can you please print out Rx for the Zolpidem 10mg and give to one us when done  Patient is all out of this medication   Thanks

## 2019-03-17 DIAGNOSIS — L71.9 ROSACEA: ICD-10-CM

## 2019-03-18 RX ORDER — METRONIDAZOLE 7.5 MG/G
GEL TOPICAL DAILY
Qty: 45 G | Refills: 0 | Status: SHIPPED | OUTPATIENT
Start: 2019-03-18 | End: 2019-07-10 | Stop reason: ALTCHOICE

## 2019-04-23 DIAGNOSIS — E78.00 HIGH CHOLESTEROL: ICD-10-CM

## 2019-04-23 RX ORDER — ATORVASTATIN CALCIUM 40 MG/1
40 TABLET, FILM COATED ORAL
Qty: 90 TABLET | Refills: 0 | Status: SHIPPED | OUTPATIENT
Start: 2019-04-23 | End: 2019-08-07 | Stop reason: SDUPTHER

## 2019-04-30 ENCOUNTER — TELEPHONE (OUTPATIENT)
Dept: NEPHROLOGY | Facility: CLINIC | Age: 69
End: 2019-04-30

## 2019-04-30 DIAGNOSIS — G47.00 INSOMNIA, UNSPECIFIED TYPE: ICD-10-CM

## 2019-04-30 RX ORDER — ZOLPIDEM TARTRATE 10 MG/1
10 TABLET ORAL
Qty: 60 TABLET | Refills: 3 | Status: SHIPPED | OUTPATIENT
Start: 2019-04-30 | End: 2019-10-29 | Stop reason: SDUPTHER

## 2019-07-05 ENCOUNTER — APPOINTMENT (OUTPATIENT)
Dept: LAB | Facility: CLINIC | Age: 69
End: 2019-07-05
Payer: COMMERCIAL

## 2019-07-05 ENCOUNTER — TRANSCRIBE ORDERS (OUTPATIENT)
Dept: LAB | Facility: CLINIC | Age: 69
End: 2019-07-05

## 2019-07-05 DIAGNOSIS — E78.00 HIGH CHOLESTEROL: ICD-10-CM

## 2019-07-05 LAB
ALBUMIN SERPL BCP-MCNC: 3.3 G/DL (ref 3.5–5)
ALP SERPL-CCNC: 91 U/L (ref 46–116)
ALT SERPL W P-5'-P-CCNC: 21 U/L (ref 12–78)
ANION GAP SERPL CALCULATED.3IONS-SCNC: 5 MMOL/L (ref 4–13)
AST SERPL W P-5'-P-CCNC: 18 U/L (ref 5–45)
BACTERIA UR QL AUTO: ABNORMAL /HPF
BILIRUB DIRECT SERPL-MCNC: 0.17 MG/DL (ref 0–0.2)
BILIRUB SERPL-MCNC: 0.6 MG/DL (ref 0.2–1)
BILIRUB UR QL STRIP: NEGATIVE
BUN SERPL-MCNC: 9 MG/DL (ref 5–25)
CALCIUM SERPL-MCNC: 8.9 MG/DL (ref 8.3–10.1)
CHLORIDE SERPL-SCNC: 106 MMOL/L (ref 100–108)
CHOLEST SERPL-MCNC: 177 MG/DL (ref 50–200)
CLARITY UR: ABNORMAL
CO2 SERPL-SCNC: 28 MMOL/L (ref 21–32)
COLOR UR: YELLOW
CREAT SERPL-MCNC: 0.85 MG/DL (ref 0.6–1.3)
ERYTHROCYTE [DISTWIDTH] IN BLOOD BY AUTOMATED COUNT: 12.9 % (ref 11.6–15.1)
GFR SERPL CREATININE-BSD FRML MDRD: 70 ML/MIN/1.73SQ M
GLUCOSE P FAST SERPL-MCNC: 118 MG/DL (ref 65–99)
GLUCOSE UR STRIP-MCNC: NEGATIVE MG/DL
HCT VFR BLD AUTO: 41.4 % (ref 34.8–46.1)
HDLC SERPL-MCNC: 47 MG/DL (ref 40–60)
HGB BLD-MCNC: 13 G/DL (ref 11.5–15.4)
HGB UR QL STRIP.AUTO: NEGATIVE
HYALINE CASTS #/AREA URNS LPF: ABNORMAL /LPF
KETONES UR STRIP-MCNC: NEGATIVE MG/DL
LDLC SERPL CALC-MCNC: 102 MG/DL (ref 0–100)
LEUKOCYTE ESTERASE UR QL STRIP: ABNORMAL
MCH RBC QN AUTO: 26.4 PG (ref 26.8–34.3)
MCHC RBC AUTO-ENTMCNC: 31.4 G/DL (ref 31.4–37.4)
MCV RBC AUTO: 84 FL (ref 82–98)
NITRITE UR QL STRIP: NEGATIVE
NON-SQ EPI CELLS URNS QL MICRO: ABNORMAL /HPF
PH UR STRIP.AUTO: 6 [PH]
PLATELET # BLD AUTO: 257 THOUSANDS/UL (ref 149–390)
PMV BLD AUTO: 12.2 FL (ref 8.9–12.7)
POTASSIUM SERPL-SCNC: 3.6 MMOL/L (ref 3.5–5.3)
PROT SERPL-MCNC: 7.5 G/DL (ref 6.4–8.2)
PROT UR STRIP-MCNC: NEGATIVE MG/DL
RBC # BLD AUTO: 4.92 MILLION/UL (ref 3.81–5.12)
RBC #/AREA URNS AUTO: ABNORMAL /HPF
SODIUM SERPL-SCNC: 139 MMOL/L (ref 136–145)
SP GR UR STRIP.AUTO: 1.01 (ref 1–1.03)
TRIGL SERPL-MCNC: 142 MG/DL
TSH SERPL DL<=0.05 MIU/L-ACNC: 3.83 UIU/ML (ref 0.36–3.74)
UROBILINOGEN UR QL STRIP.AUTO: 0.2 E.U./DL
WBC # BLD AUTO: 7.23 THOUSAND/UL (ref 4.31–10.16)
WBC #/AREA URNS AUTO: ABNORMAL /HPF

## 2019-07-05 PROCEDURE — 84443 ASSAY THYROID STIM HORMONE: CPT

## 2019-07-05 PROCEDURE — 80076 HEPATIC FUNCTION PANEL: CPT

## 2019-07-05 PROCEDURE — 81001 URINALYSIS AUTO W/SCOPE: CPT | Performed by: INTERNAL MEDICINE

## 2019-07-05 PROCEDURE — 80048 BASIC METABOLIC PNL TOTAL CA: CPT

## 2019-07-05 PROCEDURE — 80061 LIPID PANEL: CPT

## 2019-07-05 PROCEDURE — 36415 COLL VENOUS BLD VENIPUNCTURE: CPT

## 2019-07-05 PROCEDURE — 85027 COMPLETE CBC AUTOMATED: CPT

## 2019-07-10 ENCOUNTER — OFFICE VISIT (OUTPATIENT)
Dept: NEPHROLOGY | Facility: CLINIC | Age: 69
End: 2019-07-10
Payer: COMMERCIAL

## 2019-07-10 ENCOUNTER — HOSPITAL ENCOUNTER (OUTPATIENT)
Dept: RADIOLOGY | Facility: HOSPITAL | Age: 69
Discharge: HOME | End: 2019-07-10
Attending: INTERNAL MEDICINE
Payer: COMMERCIAL

## 2019-07-10 VITALS
BODY MASS INDEX: 27.31 KG/M2 | HEART RATE: 68 BPM | WEIGHT: 174 LBS | TEMPERATURE: 98.2 F | RESPIRATION RATE: 16 BRPM | HEIGHT: 67 IN | SYSTOLIC BLOOD PRESSURE: 140 MMHG | DIASTOLIC BLOOD PRESSURE: 80 MMHG

## 2019-07-10 DIAGNOSIS — R05.9 COUGH: ICD-10-CM

## 2019-07-10 DIAGNOSIS — F32.A DEPRESSIVE DISORDER: ICD-10-CM

## 2019-07-10 DIAGNOSIS — E78.00 HIGH CHOLESTEROL: ICD-10-CM

## 2019-07-10 DIAGNOSIS — G47.00 INSOMNIA, UNSPECIFIED TYPE: ICD-10-CM

## 2019-07-10 DIAGNOSIS — I10 BENIGN ESSENTIAL HYPERTENSION: Primary | ICD-10-CM

## 2019-07-10 DIAGNOSIS — G43.809 OTHER MIGRAINE WITHOUT STATUS MIGRAINOSUS, NOT INTRACTABLE: ICD-10-CM

## 2019-07-10 PROCEDURE — 99214 OFFICE O/P EST MOD 30 MIN: CPT | Performed by: INTERNAL MEDICINE

## 2019-07-10 PROCEDURE — 71046 X-RAY EXAM CHEST 2 VIEWS: CPT

## 2019-07-10 NOTE — ASSESSMENT & PLAN NOTE
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

## 2019-07-10 NOTE — ASSESSMENT & PLAN NOTE
Does get off and on    In past I had suggested to see neurologist but she is related stable decided not to go

## 2019-07-10 NOTE — PROGRESS NOTES
INTERNAL MEDICINE OFFICE FOLLOW UP  Watson Marinelli 70 y o  female MRN: [id number]    Encounter: [id number] 7/10/2019    REASON FOR VISIT: Watson Marinelli is a 70 y o  female who is here on 7/10/2019 for Follow-up    HPI:    Joshua Hanna came in today for comprehensive physical examination  69-year-old woman with not much significant medical history except high cholesterol and insomnia  She is complaining of cough and shortness of breath which is going on almost for 6-8 months  She also get chest discomfort with cough  Also has some back spasm with the cough  Cough is most of the time dry but does get some sputum production once in a while  She is not a smoker per 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  No nausea no vomiting no chest discomfort on exertion though she does get short of breath on climbing stairs    She does have headache migraine type recently she has very severe headache though did not go to hospital      REVIEW OF SYSTEMS:    Review of Systems   Constitutional: Negative for activity change and fatigue  HENT: Negative for congestion, ear discharge, postnasal drip and rhinorrhea  Eyes: Negative for photophobia and pain  Respiratory: Positive for cough and shortness of breath  Negative for apnea and choking  Cardiovascular: Negative for chest pain and palpitations  Gastrointestinal: Negative for abdominal distention, abdominal pain, blood in stool, constipation and diarrhea  Endocrine: Negative for heat intolerance and polyphagia  Genitourinary: Negative for decreased urine volume, difficulty urinating, flank pain and urgency  Musculoskeletal: Positive for arthralgias and back pain  Negative for neck pain and neck stiffness  Skin: Negative for color change and wound  Allergic/Immunologic: Negative for food allergies and immunocompromised state  Neurological: Positive for headaches  Negative for seizures and facial asymmetry  Hematological: Negative for adenopathy  Does not bruise/bleed easily  Psychiatric/Behavioral: Negative for self-injury and suicidal ideas  PAST MEDICAL HISTORY:  Past Medical History:   Diagnosis Date    Depression     Hyperlipemia     Insomnia        PAST SURGICAL HISTORY:  History reviewed  No pertinent surgical history  SOCIAL HISTORY:  Social History     Substance and Sexual Activity   Alcohol Use No     Social History     Substance and Sexual Activity   Drug Use No     Social History     Tobacco Use   Smoking Status Never Smoker   Smokeless Tobacco Never Used       FAMILY HISTORY:  Family History   Problem Relation Age of Onset    Cancer Mother     Cancer Father        MEDICATIONS:    Current Outpatient Medications:     atorvastatin (LIPITOR) 40 mg tablet, Take 1 tablet (40 mg total) by mouth daily at bedtime, Disp: 90 tablet, Rfl: 0    PARoxetine (PAXIL) 20 mg tablet, Take 1 tablet (20 mg total) by mouth daily, Disp: 90 tablet, Rfl: 1    zolpidem (AMBIEN) 10 mg tablet, Take 1 tablet (10 mg total) by mouth daily at bedtime as needed for sleep for up to 60 days, Disp: 60 tablet, Rfl: 3    PHYSICAL EXAM:  Vitals:    07/10/19 1551   BP: 140/80   BP Location: Right arm   Patient Position: Sitting   Pulse: 68   Resp: 16   Temp: 98 2 °F (36 8 °C)   TempSrc: Oral   Weight: 78 9 kg (174 lb)   Height: 5' 7" (1 702 m)     Body mass index is 27 25 kg/m²  Physical Exam   Constitutional: She is oriented to person, place, and time  She appears well-developed  No distress  HENT:   Head: Normocephalic  Right Ear: External ear normal    Left Ear: External ear normal    Mouth/Throat: Oropharynx is clear and moist  No oropharyngeal exudate  Eyes: Pupils are equal, round, and reactive to light  Conjunctivae and EOM are normal  No scleral icterus  Neck: Normal range of motion  Neck supple  No JVD present  Cardiovascular: Normal rate, regular rhythm, normal heart sounds and intact distal pulses  No murmur heard  Pulmonary/Chest: Effort normal and breath sounds normal  No respiratory distress  She has no wheezes  She has no rales  Abdominal: Soft  Bowel sounds are normal  She exhibits no distension  There is no tenderness  Musculoskeletal: Normal range of motion  She exhibits no edema  Neurological: She is alert and oriented to person, place, and time  Skin: Skin is warm  No rash noted  Psychiatric: She has a normal mood and affect   Her behavior is normal        LAB RESULTS:  Results for orders placed or performed in visit on 37/83/52   Basic metabolic panel   Result Value Ref Range    Sodium 139 136 - 145 mmol/L    Potassium 3 6 3 5 - 5 3 mmol/L    Chloride 106 100 - 108 mmol/L    CO2 28 21 - 32 mmol/L    ANION GAP 5 4 - 13 mmol/L    BUN 9 5 - 25 mg/dL    Creatinine 0 85 0 60 - 1 30 mg/dL    Glucose, Fasting 118 (H) 65 - 99 mg/dL    Calcium 8 9 8 3 - 10 1 mg/dL    eGFR 70 ml/min/1 73sq m   CBC   Result Value Ref Range    WBC 7 23 4 31 - 10 16 Thousand/uL    RBC 4 92 3 81 - 5 12 Million/uL    Hemoglobin 13 0 11 5 - 15 4 g/dL    Hematocrit 41 4 34 8 - 46 1 %    MCV 84 82 - 98 fL    MCH 26 4 (L) 26 8 - 34 3 pg    MCHC 31 4 31 4 - 37 4 g/dL    RDW 12 9 11 6 - 15 1 %    Platelets 938 358 - 574 Thousands/uL    MPV 12 2 8 9 - 12 7 fL   Hepatic function panel   Result Value Ref Range    Total Bilirubin 0 60 0 20 - 1 00 mg/dL    Bilirubin, Direct 0 17 0 00 - 0 20 mg/dL    Alkaline Phosphatase 91 46 - 116 U/L    AST 18 5 - 45 U/L    ALT 21 12 - 78 U/L    Total Protein 7 5 6 4 - 8 2 g/dL    Albumin 3 3 (L) 3 5 - 5 0 g/dL   Lipid Panel with Direct LDL reflex   Result Value Ref Range    Cholesterol 177 50 - 200 mg/dL    Triglycerides 142 <=150 mg/dL    HDL, Direct 47 40 - 60 mg/dL    LDL Calculated 102 (H) 0 - 100 mg/dL   TSH, 3rd generation   Result Value Ref Range    TSH 3RD GENERATON 3 830 (H) 0 358 - 3 740 uIU/mL       ASSESSMENT and PLAN:  Cough  Her lung examination is clear to I 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  Cough variant in nature  I will get a chest x-ray on her  And based on that finding I may refer her to pulmonologist for further evaluation management  I am not sure it is cardiac in origin the way she describe any symptoms  I discussed everything at length with her    High cholesterol  Very well controlled with atorvastatin    Insomnia  She still take Ambien for the management    Benign essential hypertension  Very well controlled without any medication    Migraine  Does get off and on  In past I had suggested to see neurologist but she is related stable decided not to go    Depressive disorder  On antidepressant and seems to be stable    I will get a chest x-ray on her  I will refer her to pulmonologist based on the chest x-ray finding  Advised to drink lots of liquid  I discuss her blood work with her which was essentially normal   I will see her back in 3 months for the close follow-up of this cough and shortness of breath  If she started getting more short of breath started bleeding chest pain she will call me  Portions of the record may have been created with voice recognition software  Occasional wrong word or "sound a like" substitutions may have occurred due to the inherent limitations of voice recognition software  Read the chart carefully and recognize, using context, where substitutions have occurred  If you have any questions, please contact the dictating provider

## 2019-08-03 DIAGNOSIS — L71.9 ROSACEA: ICD-10-CM

## 2019-08-05 RX ORDER — METRONIDAZOLE 7.5 MG/G
GEL TOPICAL DAILY
Qty: 45 G | Refills: 0 | OUTPATIENT
Start: 2019-08-05

## 2019-08-07 DIAGNOSIS — E78.00 HIGH CHOLESTEROL: ICD-10-CM

## 2019-08-07 RX ORDER — ATORVASTATIN CALCIUM 40 MG/1
40 TABLET, FILM COATED ORAL
Qty: 90 TABLET | Refills: 0 | Status: SHIPPED | OUTPATIENT
Start: 2019-08-07 | End: 2019-11-03 | Stop reason: SDUPTHER

## 2019-08-07 NOTE — TELEPHONE ENCOUNTER
Patient of Dr Nickie Buckner called for a refill for her Atorvastaitin (High Cholesterol) 40mg 1 a day w/ 90 qu  [facility] call [facility] @ [phone]  If any questions please call patient @ [phone]   Eugene Doyle,

## 2019-09-23 DIAGNOSIS — F32.A DEPRESSION, UNSPECIFIED DEPRESSION TYPE: ICD-10-CM

## 2019-09-23 NOTE — TELEPHONE ENCOUNTER
Patient of Dr Martinez Iba called stating that she needs a refill for her Paxil 20mg 1 a day with 80 qu  Patient states that she has 4 pills left  Please [facility] @ [phone] for refill  If any questions please call patient at [phone]   Jadiel Rojas,

## 2019-09-24 RX ORDER — PAROXETINE HYDROCHLORIDE 20 MG/1
20 TABLET, FILM COATED ORAL DAILY
Qty: 90 TABLET | Refills: 3 | Status: SHIPPED | OUTPATIENT
Start: 2019-09-24 | End: 2019-09-24 | Stop reason: SDUPTHER

## 2019-09-24 RX ORDER — PAROXETINE HYDROCHLORIDE 20 MG/1
20 TABLET, FILM COATED ORAL DAILY
Qty: 90 TABLET | Refills: 3 | Status: SHIPPED | OUTPATIENT
Start: 2019-09-24 | End: 2020-09-08

## 2019-10-29 ENCOUNTER — TELEPHONE (OUTPATIENT)
Dept: NEPHROLOGY | Facility: CLINIC | Age: 69
End: 2019-10-29

## 2019-10-29 DIAGNOSIS — G47.00 INSOMNIA, UNSPECIFIED TYPE: ICD-10-CM

## 2019-10-29 RX ORDER — ZOLPIDEM TARTRATE 10 MG/1
10 TABLET ORAL
Qty: 60 TABLET | Refills: 3 | Status: SHIPPED | OUTPATIENT
Start: 2019-10-29 | End: 2019-12-30 | Stop reason: SDUPTHER

## 2019-10-29 NOTE — TELEPHONE ENCOUNTER
I called and spoke to patient and told her that per Dr Celine Resendiz that her Rx was ready for pickup  The patient stated that she was coming back today to pick it up   Jagdish Christina,

## 2019-10-29 NOTE — TELEPHONE ENCOUNTER
Patient of Dr Romulo Mcgarry stated that she needs a refill for her Zolpidem 10 mg 1 at bed time  Please call patient @ [phone] when Rx is ready for    Milagro Vargas,

## 2019-11-03 DIAGNOSIS — E78.00 HIGH CHOLESTEROL: ICD-10-CM

## 2019-11-03 RX ORDER — ATORVASTATIN CALCIUM 40 MG/1
TABLET, FILM COATED ORAL
Qty: 90 TABLET | Refills: 0 | Status: SHIPPED | OUTPATIENT
Start: 2019-11-03 | End: 2019-12-26 | Stop reason: SDUPTHER

## 2019-11-12 ENCOUNTER — OFFICE VISIT (OUTPATIENT)
Dept: NEPHROLOGY | Facility: CLINIC | Age: 69
End: 2019-11-12
Payer: COMMERCIAL

## 2019-11-12 VITALS
HEIGHT: 67 IN | BODY MASS INDEX: 27.78 KG/M2 | SYSTOLIC BLOOD PRESSURE: 100 MMHG | TEMPERATURE: 97.4 F | HEART RATE: 68 BPM | WEIGHT: 177 LBS | RESPIRATION RATE: 16 BRPM | DIASTOLIC BLOOD PRESSURE: 70 MMHG

## 2019-11-12 DIAGNOSIS — I10 BENIGN ESSENTIAL HYPERTENSION: Primary | ICD-10-CM

## 2019-11-12 DIAGNOSIS — G47.00 INSOMNIA, UNSPECIFIED TYPE: ICD-10-CM

## 2019-11-12 DIAGNOSIS — F32.A DEPRESSIVE DISORDER: ICD-10-CM

## 2019-11-12 DIAGNOSIS — E78.00 HIGH CHOLESTEROL: ICD-10-CM

## 2019-11-12 DIAGNOSIS — R05.9 COUGH: ICD-10-CM

## 2019-11-12 PROCEDURE — 99213 OFFICE O/P EST LOW 20 MIN: CPT | Performed by: INTERNAL MEDICINE

## 2019-11-12 NOTE — PROGRESS NOTES
INTERNAL MEDICINE OFFICE FOLLOW UP  Eddie Sanches [place] 78 y o  female MRN: [id number]    Encounter: [id number] 11/12/2019    REASON FOR VISIT: Maryruth Moritz is a 78 y o  female who is here on 11/12/2019 for Follow-up and Hypertension    HPI:    Eddie Sanches came in today for the follow-up of medical problem  She is doing quite well when I saw her last time she has some cough cold  Overall she is doing much better  Denies any shortness of breath no cough no cold at this point  She does have chronic problem insomnia but she taking medicine for that and stable with that      REVIEW OF SYSTEMS:    Review of Systems   Constitutional: Negative for activity change and fatigue  HENT: Negative for congestion and ear discharge  Eyes: Negative for photophobia and pain  Respiratory: Negative for apnea and choking  Cardiovascular: Negative for chest pain and palpitations  Gastrointestinal: Negative for abdominal distention and blood in stool  Endocrine: Negative for heat intolerance and polyphagia  Genitourinary: Negative for flank pain and urgency  Musculoskeletal: Negative for neck pain and neck stiffness  Skin: Negative for color change and wound  Allergic/Immunologic: Negative for food allergies and immunocompromised state  Neurological: Negative for seizures and facial asymmetry  Hematological: Negative for adenopathy  Does not bruise/bleed easily  Psychiatric/Behavioral: Negative for self-injury and suicidal ideas  PAST MEDICAL HISTORY:  Past Medical History:   Diagnosis Date    Depression     Hyperlipemia     Insomnia        PAST SURGICAL HISTORY:  History reviewed  No pertinent surgical history      SOCIAL HISTORY:  Social History     Substance and Sexual Activity   Alcohol Use No     Social History     Substance and Sexual Activity   Drug Use No     Social History     Tobacco Use   Smoking Status Never Smoker   Smokeless Tobacco Never Used       FAMILY HISTORY:  Family History   Problem Relation Age of Onset    Cancer Mother     Cancer Father        MEDICATIONS:    Current Outpatient Medications:     atorvastatin (LIPITOR) 40 mg tablet, TAKE 1 TABLET BY MOUTH DAILY AT BEDTIME, Disp: 90 tablet, Rfl: 0    PARoxetine (PAXIL) 20 mg tablet, Take 1 tablet (20 mg total) by mouth daily, Disp: 90 tablet, Rfl: 3    zolpidem (AMBIEN) 10 mg tablet, Take 1 tablet (10 mg total) by mouth daily at bedtime as needed for sleep, Disp: 60 tablet, Rfl: 3    PHYSICAL EXAM:  Vitals:    11/12/19 1535   BP: 100/70   BP Location: Right arm   Patient Position: Sitting   Cuff Size: Standard   Pulse: 68   Resp: 16   Temp: (!) 97 4 °F (36 3 °C)   TempSrc: Tympanic   Weight: 80 3 kg (177 lb)   Height: 5' 7" (1 702 m)     Body mass index is 27 72 kg/m²  Physical Exam   Constitutional: She is oriented to person, place, and time  She appears well-developed  No distress  HENT:   Head: Normocephalic  Mouth/Throat: Oropharynx is clear and moist    Eyes: Conjunctivae are normal  No scleral icterus  Neck: Neck supple  No JVD present  Cardiovascular: Normal rate and normal heart sounds  Pulmonary/Chest: Effort normal  She has no wheezes  Abdominal: Soft  There is no tenderness  Musculoskeletal: Normal range of motion  She exhibits no edema  Neurological: She is alert and oriented to person, place, and time  Skin: Skin is warm  No rash noted  Psychiatric: She has a normal mood and affect   Her behavior is normal        LAB RESULTS:  Results for orders placed or performed in visit on 22/12/24   Basic metabolic panel   Result Value Ref Range    Sodium 139 136 - 145 mmol/L    Potassium 3 6 3 5 - 5 3 mmol/L    Chloride 106 100 - 108 mmol/L    CO2 28 21 - 32 mmol/L    ANION GAP 5 4 - 13 mmol/L    BUN 9 5 - 25 mg/dL    Creatinine 0 85 0 60 - 1 30 mg/dL    Glucose, Fasting 118 (H) 65 - 99 mg/dL    Calcium 8 9 8 3 - 10 1 mg/dL    eGFR 70 ml/min/1 73sq m   CBC   Result Value Ref Range    WBC 7 23 4 31 - 10 16 Thousand/uL    RBC 4 92 3 81 - 5 12 Million/uL    Hemoglobin 13 0 11 5 - 15 4 g/dL    Hematocrit 41 4 34 8 - 46 1 %    MCV 84 82 - 98 fL    MCH 26 4 (L) 26 8 - 34 3 pg    MCHC 31 4 31 4 - 37 4 g/dL    RDW 12 9 11 6 - 15 1 %    Platelets 741 841 - 233 Thousands/uL    MPV 12 2 8 9 - 12 7 fL   Hepatic function panel   Result Value Ref Range    Total Bilirubin 0 60 0 20 - 1 00 mg/dL    Bilirubin, Direct 0 17 0 00 - 0 20 mg/dL    Alkaline Phosphatase 91 46 - 116 U/L    AST 18 5 - 45 U/L    ALT 21 12 - 78 U/L    Total Protein 7 5 6 4 - 8 2 g/dL    Albumin 3 3 (L) 3 5 - 5 0 g/dL   Lipid Panel with Direct LDL reflex   Result Value Ref Range    Cholesterol 177 50 - 200 mg/dL    Triglycerides 142 <=150 mg/dL    HDL, Direct 47 40 - 60 mg/dL    LDL Calculated 102 (H) 0 - 100 mg/dL   TSH, 3rd generation   Result Value Ref Range    TSH 3RD GENERATON 3 830 (H) 0 358 - 3 740 uIU/mL       ASSESSMENT and PLAN:  Cough  Much better  Looks like it is because of allergy and some while syndrome    Depressive disorder  Stable on medication    High cholesterol  Well control    Insomnia  On medication      I will see her back in July for comprehensive physical examination  I discussed present blood work with her and will do repeat blood work before her next visit        Portions of the record may have been created with voice recognition software  Occasional wrong word or "sound a like" substitutions may have occurred due to the inherent limitations of voice recognition software  Read the chart carefully and recognize, using context, where substitutions have occurred  If you have any questions, please contact the dictating provider

## 2019-12-26 DIAGNOSIS — E78.00 HIGH CHOLESTEROL: ICD-10-CM

## 2019-12-30 ENCOUNTER — TELEPHONE (OUTPATIENT)
Dept: NEPHROLOGY | Facility: CLINIC | Age: 69
End: 2019-12-30

## 2019-12-30 DIAGNOSIS — G47.00 INSOMNIA, UNSPECIFIED TYPE: ICD-10-CM

## 2019-12-30 RX ORDER — ZOLPIDEM TARTRATE 10 MG/1
10 TABLET ORAL
Qty: 60 TABLET | Refills: 3 | Status: SHIPPED | OUTPATIENT
Start: 2019-12-30 | End: 2020-06-22 | Stop reason: SDUPTHER

## 2019-12-30 NOTE — TELEPHONE ENCOUNTER
Patient of Dr Leelee Bautista called stating that she needs a refill for her Zolpidem (Ambien) 10 mg 1 at bedtime with 60 qu  Please call patient at [phone] when Rx is ready for pickup   Manasa Guzman,

## 2020-01-02 RX ORDER — ATORVASTATIN CALCIUM 40 MG/1
TABLET, FILM COATED ORAL
Qty: 90 TABLET | Refills: 0 | Status: SHIPPED | OUTPATIENT
Start: 2020-01-02 | End: 2020-04-19

## 2020-04-18 DIAGNOSIS — E78.00 HIGH CHOLESTEROL: ICD-10-CM

## 2020-04-19 RX ORDER — ATORVASTATIN CALCIUM 40 MG/1
TABLET, FILM COATED ORAL
Qty: 90 TABLET | Refills: 0 | Status: SHIPPED | OUTPATIENT
Start: 2020-04-19 | End: 2020-07-14

## 2020-06-21 DIAGNOSIS — G47.00 INSOMNIA, UNSPECIFIED TYPE: ICD-10-CM

## 2020-06-21 RX ORDER — ZOLPIDEM TARTRATE 10 MG/1
10 TABLET ORAL
Qty: 60 TABLET | Refills: 0 | Status: CANCELLED | OUTPATIENT
Start: 2020-06-21 | End: 2020-08-20

## 2020-06-22 DIAGNOSIS — G47.00 INSOMNIA, UNSPECIFIED TYPE: ICD-10-CM

## 2020-06-22 RX ORDER — ZOLPIDEM TARTRATE 10 MG/1
10 TABLET ORAL
Qty: 60 TABLET | Refills: 3 | Status: SHIPPED | OUTPATIENT
Start: 2020-06-22 | End: 2021-02-16

## 2020-07-08 ENCOUNTER — APPOINTMENT (OUTPATIENT)
Dept: LAB | Facility: CLINIC | Age: 70
End: 2020-07-08
Payer: COMMERCIAL

## 2020-07-08 DIAGNOSIS — E78.00 HIGH CHOLESTEROL: ICD-10-CM

## 2020-07-08 LAB
ALBUMIN SERPL BCP-MCNC: 3.6 G/DL (ref 3.5–5)
ALP SERPL-CCNC: 95 U/L (ref 46–116)
ALT SERPL W P-5'-P-CCNC: 15 U/L (ref 12–78)
ANION GAP SERPL CALCULATED.3IONS-SCNC: 5 MMOL/L (ref 4–13)
AST SERPL W P-5'-P-CCNC: 16 U/L (ref 5–45)
BILIRUB DIRECT SERPL-MCNC: 0.19 MG/DL (ref 0–0.2)
BILIRUB SERPL-MCNC: 0.64 MG/DL (ref 0.2–1)
BUN SERPL-MCNC: 7 MG/DL (ref 5–25)
CALCIUM SERPL-MCNC: 9.1 MG/DL (ref 8.3–10.1)
CHLORIDE SERPL-SCNC: 104 MMOL/L (ref 100–108)
CHOLEST SERPL-MCNC: 183 MG/DL (ref 50–200)
CO2 SERPL-SCNC: 29 MMOL/L (ref 21–32)
CREAT SERPL-MCNC: 0.85 MG/DL (ref 0.6–1.3)
ERYTHROCYTE [DISTWIDTH] IN BLOOD BY AUTOMATED COUNT: 14.1 % (ref 11.6–15.1)
GFR SERPL CREATININE-BSD FRML MDRD: 70 ML/MIN/1.73SQ M
GLUCOSE P FAST SERPL-MCNC: 115 MG/DL (ref 65–99)
HCT VFR BLD AUTO: 45.2 % (ref 34.8–46.1)
HDLC SERPL-MCNC: 51 MG/DL
HGB BLD-MCNC: 13.8 G/DL (ref 11.5–15.4)
LDLC SERPL CALC-MCNC: 101 MG/DL (ref 0–100)
MCH RBC QN AUTO: 26.5 PG (ref 26.8–34.3)
MCHC RBC AUTO-ENTMCNC: 30.5 G/DL (ref 31.4–37.4)
MCV RBC AUTO: 87 FL (ref 82–98)
PLATELET # BLD AUTO: 241 THOUSANDS/UL (ref 149–390)
PMV BLD AUTO: 12 FL (ref 8.9–12.7)
POTASSIUM SERPL-SCNC: 4.2 MMOL/L (ref 3.5–5.3)
PROT SERPL-MCNC: 7.8 G/DL (ref 6.4–8.2)
RBC # BLD AUTO: 5.21 MILLION/UL (ref 3.81–5.12)
SODIUM SERPL-SCNC: 138 MMOL/L (ref 136–145)
TRIGL SERPL-MCNC: 156 MG/DL
TSH SERPL DL<=0.05 MIU/L-ACNC: 2.71 UIU/ML (ref 0.36–3.74)
WBC # BLD AUTO: 6.86 THOUSAND/UL (ref 4.31–10.16)

## 2020-07-08 PROCEDURE — 85027 COMPLETE CBC AUTOMATED: CPT

## 2020-07-08 PROCEDURE — 80076 HEPATIC FUNCTION PANEL: CPT

## 2020-07-08 PROCEDURE — 36415 COLL VENOUS BLD VENIPUNCTURE: CPT

## 2020-07-08 PROCEDURE — 80048 BASIC METABOLIC PNL TOTAL CA: CPT

## 2020-07-08 PROCEDURE — 80061 LIPID PANEL: CPT

## 2020-07-08 PROCEDURE — 84443 ASSAY THYROID STIM HORMONE: CPT

## 2020-07-10 ENCOUNTER — TELEPHONE (OUTPATIENT)
Dept: NEPHROLOGY | Facility: CLINIC | Age: 70
End: 2020-07-10

## 2020-07-10 NOTE — TELEPHONE ENCOUNTER
Called and left message on machine for patient to return our call about confirming appointment   Lizzy Petersen,

## 2020-07-13 ENCOUNTER — OFFICE VISIT (OUTPATIENT)
Dept: NEPHROLOGY | Facility: CLINIC | Age: 70
End: 2020-07-13
Payer: COMMERCIAL

## 2020-07-13 VITALS
BODY MASS INDEX: 27.1 KG/M2 | TEMPERATURE: 98 F | SYSTOLIC BLOOD PRESSURE: 110 MMHG | DIASTOLIC BLOOD PRESSURE: 70 MMHG | HEIGHT: 68 IN | WEIGHT: 178.8 LBS | RESPIRATION RATE: 16 BRPM | HEART RATE: 68 BPM

## 2020-07-13 DIAGNOSIS — G43.809 OTHER MIGRAINE WITHOUT STATUS MIGRAINOSUS, NOT INTRACTABLE: ICD-10-CM

## 2020-07-13 DIAGNOSIS — E78.00 HIGH CHOLESTEROL: Primary | ICD-10-CM

## 2020-07-13 DIAGNOSIS — F32.A DEPRESSIVE DISORDER: ICD-10-CM

## 2020-07-13 DIAGNOSIS — L71.9 ROSACEA: ICD-10-CM

## 2020-07-13 DIAGNOSIS — G47.00 INSOMNIA, UNSPECIFIED TYPE: ICD-10-CM

## 2020-07-13 DIAGNOSIS — E78.00 HIGH CHOLESTEROL: ICD-10-CM

## 2020-07-13 DIAGNOSIS — R73.9 HYPERGLYCEMIA: ICD-10-CM

## 2020-07-13 PROCEDURE — 99214 OFFICE O/P EST MOD 30 MIN: CPT | Performed by: INTERNAL MEDICINE

## 2020-07-13 NOTE — PROGRESS NOTES
INTERNAL MEDICINE OFFICE FOLLOW UP  Robbin Pope 78 y o  female MRN: [id number]    Encounter: [id number] 7/13/2020    REASON FOR VISIT: Haven Diamond is a 78 y o  female who is here on 7/13/2020 for Hypertension and Follow-up    HPI:    Robbin Avila came in today for comprehensive physical examination    76-year-old woman with history of depression and insomnia along with high cholesterol  She is doing well overall  Denies any complaint today other than insomnia    Patient is stable for a while  She does not want any preventive a examination like colonoscopy Pap testing or mammogram    Her weight is steady    She is eating and drinking well    No shortness of breath no chest pain no palpitation      REVIEW OF SYSTEMS:    Review of Systems   Constitutional: Negative for activity change and fatigue  HENT: Negative for congestion and ear discharge  Eyes: Negative for photophobia and pain  Respiratory: Negative for apnea and choking  Cardiovascular: Negative for chest pain and palpitations  Gastrointestinal: Negative for abdominal distention and blood in stool  Endocrine: Negative for heat intolerance and polyphagia  Genitourinary: Negative for flank pain and urgency  Musculoskeletal: Negative for neck pain and neck stiffness  Skin: Negative for color change and wound  Allergic/Immunologic: Negative for food allergies and immunocompromised state  Neurological: Negative for seizures and facial asymmetry  Hematological: Negative for adenopathy  Does not bruise/bleed easily  Psychiatric/Behavioral: Negative for self-injury and suicidal ideas  PAST MEDICAL HISTORY:  Past Medical History:   Diagnosis Date    Depression     Hyperlipemia     Hypertension     Insomnia        PAST SURGICAL HISTORY:  History reviewed  No pertinent surgical history      SOCIAL HISTORY:  Social History     Substance and Sexual Activity   Alcohol Use No     Social History     Substance and Sexual Activity   Drug Use No     Social History     Tobacco Use   Smoking Status Never Smoker   Smokeless Tobacco Never Used       FAMILY HISTORY:  Family History   Problem Relation Age of Onset    Cancer Mother     Cancer Father        MEDICATIONS:    Current Outpatient Medications:     atorvastatin (LIPITOR) 40 mg tablet, TAKE 1 TABLET BY MOUTH EVERYDAY AT BEDTIME, Disp: 90 tablet, Rfl: 0    PARoxetine (PAXIL) 20 mg tablet, Take 1 tablet (20 mg total) by mouth daily, Disp: 90 tablet, Rfl: 3    zolpidem (AMBIEN) 10 mg tablet, Take 1 tablet (10 mg total) by mouth daily at bedtime as needed for sleep, Disp: 60 tablet, Rfl: 3    metroNIDAZOLE (METROCREAM) 0 75 % cream, Apply topically 2 (two) times a day, Disp: 45 g, Rfl: 0    PHYSICAL EXAM:  Vitals:    07/13/20 1616   BP: 110/70   BP Location: Right arm   Patient Position: Sitting   Pulse: 68   Resp: 16   Temp: 98 °F (36 7 °C)   TempSrc: Tympanic   Weight: 81 1 kg (178 lb 12 8 oz)   Height: 5' 7 5" (1 715 m)     Body mass index is 27 59 kg/m²  Physical Exam   Constitutional: She is oriented to person, place, and time  She appears well-developed  No distress  HENT:   Head: Normocephalic and atraumatic  Mouth/Throat: Oropharynx is clear and moist    Eyes: Conjunctivae are normal  No scleral icterus  Neck: Normal range of motion  Neck supple  No JVD present  Cardiovascular: Normal rate, regular rhythm, normal heart sounds and intact distal pulses  No murmur heard  Pulmonary/Chest: Effort normal and breath sounds normal  No respiratory distress  She has no wheezes  Abdominal: Soft  There is no tenderness  Musculoskeletal: Normal range of motion  She exhibits no edema  Neurological: She is alert and oriented to person, place, and time  Skin: Skin is warm  No rash noted  Psychiatric: She has a normal mood and affect   Her behavior is normal        LAB RESULTS:  Results for orders placed or performed in visit on 07/08/20   Hepatic function panel   Result Value Ref Range    Total Bilirubin 0 64 0 20 - 1 00 mg/dL    Bilirubin, Direct 0 19 0 00 - 0 20 mg/dL    Alkaline Phosphatase 95 46 - 116 U/L    AST 16 5 - 45 U/L    ALT 15 12 - 78 U/L    Total Protein 7 8 6 4 - 8 2 g/dL    Albumin 3 6 3 5 - 5 0 g/dL   Lipid Panel with Direct LDL reflex   Result Value Ref Range    Cholesterol 183 50 - 200 mg/dL    Triglycerides 156 (H) <=150 mg/dL    HDL, Direct 51 >=40 mg/dL    LDL Calculated 101 (H) 0 - 100 mg/dL   TSH, 3rd generation   Result Value Ref Range    TSH 3RD GENERATON 2 710 0 358 - 3 740 uIU/mL   Basic metabolic panel   Result Value Ref Range    Sodium 138 136 - 145 mmol/L    Potassium 4 2 3 5 - 5 3 mmol/L    Chloride 104 100 - 108 mmol/L    CO2 29 21 - 32 mmol/L    ANION GAP 5 4 - 13 mmol/L    BUN 7 5 - 25 mg/dL    Creatinine 0 85 0 60 - 1 30 mg/dL    Glucose, Fasting 115 (H) 65 - 99 mg/dL    Calcium 9 1 8 3 - 10 1 mg/dL    eGFR 70 ml/min/1 73sq m   CBC   Result Value Ref Range    WBC 6 86 4 31 - 10 16 Thousand/uL    RBC 5 21 (H) 3 81 - 5 12 Million/uL    Hemoglobin 13 8 11 5 - 15 4 g/dL    Hematocrit 45 2 34 8 - 46 1 %    MCV 87 82 - 98 fL    MCH 26 5 (L) 26 8 - 34 3 pg    MCHC 30 5 (L) 31 4 - 37 4 g/dL    RDW 14 1 11 6 - 15 1 %    Platelets 957 095 - 940 Thousands/uL    MPV 12 0 8 9 - 12 7 fL       ASSESSMENT and PLAN:  Migraine  Does not have any more headache     Rosacea  Patient claims because she has to wear mask all the time at the developing rash  Usual respond to metronidazole cream which I prescribed    Depressive disorder  Quite stable with Paxil which I will continue    High cholesterol  Well controlled with present medication inform up at was stated which I will continue    Insomnia  Patient still need Ambien  I had discussed in the past and discussed today again with side effect to Ambien advised to not to use it regularly    Hyperglycemia  Patient blood glucose is about 115    Will check hemoglobin A1c to rule out any diabetes    Patient will come back to see me in 1 year for comprehensive physical examination and will get blood test at that time again    Portions of the record may have been created with voice recognition software  Occasional wrong word or "sound a like" substitutions may have occurred due to the inherent limitations of voice recognition software  Read the chart carefully and recognize, using context, where substitutions have occurred  If you have any questions, please contact the dictating provider

## 2020-07-13 NOTE — ASSESSMENT & PLAN NOTE
Patient still need Ambien    I had discussed in the past and discussed today again with side effect to Ambien advised to not to use it regularly

## 2020-07-13 NOTE — ASSESSMENT & PLAN NOTE
Patient claims because she has to wear mask all the time at the developing rash    Usual respond to metronidazole cream which I prescribed

## 2020-07-14 RX ORDER — ATORVASTATIN CALCIUM 40 MG/1
TABLET, FILM COATED ORAL
Qty: 90 TABLET | Refills: 0 | Status: SHIPPED | OUTPATIENT
Start: 2020-07-14 | End: 2020-10-07

## 2020-08-02 ENCOUNTER — HOSPITAL ENCOUNTER (EMERGENCY)
Facility: HOSPITAL | Age: 70
Discharge: HOME | End: 2020-08-03
Attending: EMERGENCY MEDICINE | Admitting: EMERGENCY MEDICINE
Payer: COMMERCIAL

## 2020-08-02 ENCOUNTER — APPOINTMENT (EMERGENCY)
Dept: CT IMAGING | Facility: HOSPITAL | Age: 70
End: 2020-08-02
Payer: COMMERCIAL

## 2020-08-02 VITALS
TEMPERATURE: 98.8 F | SYSTOLIC BLOOD PRESSURE: 150 MMHG | OXYGEN SATURATION: 95 % | BODY MASS INDEX: 26.98 KG/M2 | WEIGHT: 178 LBS | DIASTOLIC BLOOD PRESSURE: 72 MMHG | HEART RATE: 58 BPM | HEIGHT: 68 IN | RESPIRATION RATE: 16 BRPM

## 2020-08-02 DIAGNOSIS — S09.90XA HEAD INJURY: Primary | ICD-10-CM

## 2020-08-02 DIAGNOSIS — S00.81XA FOREHEAD ABRASION: ICD-10-CM

## 2020-08-02 DIAGNOSIS — R07.9 CHEST PAIN: ICD-10-CM
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
## 2020-08-02 LAB
ANION GAP SERPL CALCULATED.3IONS-SCNC: 7 MMOL/L (ref 4–13)
BASOPHILS # BLD AUTO: 0.04 THOUSANDS/ΜL (ref 0–0.1)
BASOPHILS NFR BLD AUTO: 1 % (ref 0–1)
BUN SERPL-MCNC: 9 MG/DL (ref 5–25)
CALCIUM SERPL-MCNC: 8.5 MG/DL (ref 8.3–10.1)
CHLORIDE SERPL-SCNC: 107 MMOL/L (ref 100–108)
CO2 SERPL-SCNC: 31 MMOL/L (ref 21–32)
CREAT SERPL-MCNC: 1.08 MG/DL (ref 0.6–1.3)
EOSINOPHIL # BLD AUTO: 0.26 THOUSAND/ΜL (ref 0–0.61)
EOSINOPHIL NFR BLD AUTO: 3 % (ref 0–6)
ERYTHROCYTE [DISTWIDTH] IN BLOOD BY AUTOMATED COUNT: 13.9 % (ref 11.6–15.1)
GFR SERPL CREATININE-BSD FRML MDRD: 52 ML/MIN/1.73SQ M
GLUCOSE SERPL-MCNC: 124 MG/DL (ref 65–140)
HCT VFR BLD AUTO: 39.4 % (ref 34.8–46.1)
HGB BLD-MCNC: 12.3 G/DL (ref 11.5–15.4)
IMM GRANULOCYTES # BLD AUTO: 0.02 THOUSAND/UL (ref 0–0.2)
IMM GRANULOCYTES NFR BLD AUTO: 0 % (ref 0–2)
LYMPHOCYTES # BLD AUTO: 2.6 THOUSANDS/ΜL (ref 0.6–4.47)
LYMPHOCYTES NFR BLD AUTO: 31 % (ref 14–44)
MCH RBC QN AUTO: 26.5 PG (ref 26.8–34.3)
MCHC RBC AUTO-ENTMCNC: 31.2 G/DL (ref 31.4–37.4)
MCV RBC AUTO: 85 FL (ref 82–98)
MONOCYTES # BLD AUTO: 0.72 THOUSAND/ΜL (ref 0.17–1.22)
MONOCYTES NFR BLD AUTO: 9 % (ref 4–12)
NEUTROPHILS # BLD AUTO: 4.84 THOUSANDS/ΜL (ref 1.85–7.62)
NEUTS SEG NFR BLD AUTO: 56 % (ref 43–75)
NRBC BLD AUTO-RTO: 0 /100 WBCS
PLATELET # BLD AUTO: 232 THOUSANDS/UL (ref 149–390)
PMV BLD AUTO: 11.5 FL (ref 8.9–12.7)
POTASSIUM SERPL-SCNC: 3.5 MMOL/L (ref 3.5–5.3)
RBC # BLD AUTO: 4.64 MILLION/UL (ref 3.81–5.12)
SODIUM SERPL-SCNC: 145 MMOL/L (ref 136–145)
TROPONIN I SERPL-MCNC: <0.02 NG/ML
WBC # BLD AUTO: 8.48 THOUSAND/UL (ref 4.31–10.16)

## 2020-08-02 PROCEDURE — G1004 CDSM NDSC: HCPCS

## 2020-08-02 PROCEDURE — 70450 CT HEAD/BRAIN W/O DYE: CPT

## 2020-08-02 PROCEDURE — 80048 BASIC METABOLIC PNL TOTAL CA: CPT | Performed by: EMERGENCY MEDICINE

## 2020-08-02 PROCEDURE — 85025 COMPLETE CBC W/AUTO DIFF WBC: CPT | Performed by: EMERGENCY MEDICINE

## 2020-08-02 PROCEDURE — 99284 EMERGENCY DEPT VISIT MOD MDM: CPT

## 2020-08-02 PROCEDURE — 90471 IMMUNIZATION ADMIN: CPT

## 2020-08-02 PROCEDURE — 84484 ASSAY OF TROPONIN QUANT: CPT | Performed by: EMERGENCY MEDICINE

## 2020-08-02 PROCEDURE — 93005 ELECTROCARDIOGRAM TRACING: CPT

## 2020-08-02 PROCEDURE — 99284 EMERGENCY DEPT VISIT MOD MDM: CPT | Performed by: EMERGENCY MEDICINE

## 2020-08-02 PROCEDURE — 72125 CT NECK SPINE W/O DYE: CPT

## 2020-08-02 PROCEDURE — 36415 COLL VENOUS BLD VENIPUNCTURE: CPT | Performed by: EMERGENCY MEDICINE

## 2020-08-02 PROCEDURE — 70486 CT MAXILLOFACIAL W/O DYE: CPT

## 2020-08-02 PROCEDURE — 90715 TDAP VACCINE 7 YRS/> IM: CPT | Performed by: EMERGENCY MEDICINE

## 2020-08-02 PROCEDURE — 71260 CT THORAX DX C+: CPT

## 2020-08-02 RX ORDER — ACETAMINOPHEN 325 MG/1
975 TABLET ORAL ONCE
Status: COMPLETED | OUTPATIENT
Start: 2020-08-02 | End: 2020-08-02

## 2020-08-02 RX ORDER — GINSENG 100 MG
1 CAPSULE ORAL ONCE
Status: COMPLETED | OUTPATIENT
Start: 2020-08-02 | End: 2020-08-02

## 2020-08-02 RX ADMIN — BACITRACIN ZINC 1 LARGE APPLICATION: 500 OINTMENT TOPICAL at 22:22

## 2020-08-02 RX ADMIN — TETANUS TOXOID, REDUCED DIPHTHERIA TOXOID AND ACELLULAR PERTUSSIS VACCINE, ADSORBED 0.5 ML: 5; 2.5; 8; 8; 2.5 SUSPENSION INTRAMUSCULAR at 22:17

## 2020-08-02 RX ADMIN — IOHEXOL 100 ML: 350 INJECTION, SOLUTION INTRAVENOUS at 23:08

## 2020-08-02 RX ADMIN — ACETAMINOPHEN 975 MG: 325 TABLET, FILM COATED ORAL at 22:50

## 2020-08-03 LAB
ATRIAL RATE: 69 BPM
P AXIS: 53 DEGREES
PR INTERVAL: 160 MS
QRS AXIS: 23 DEGREES
QRSD INTERVAL: 72 MS
QT INTERVAL: 414 MS
QTC INTERVAL: 443 MS
T WAVE AXIS: 72 DEGREES
VENTRICULAR RATE: 69 BPM

## 2020-08-03 PROCEDURE — 93010 ELECTROCARDIOGRAM REPORT: CPT | Performed by: INTERNAL MEDICINE

## 2020-08-03 NOTE — ED PROVIDER NOTES
History  Chief Complaint   Patient presents with   [facility] Fall     pt states she tripped and fell on friday and hit her head on a rock, pt denies LOC and is - BT, pt states she came in tonight because she is feeling dizzy and nauseaos     History of Present Illness   69 y o  female presents to the ED after fall on Friday  Patient affirms striking her head and denies any loss of consciousness  Patient states the fall occurred when she was on an incline, stooped over to  a rock and fell forward onto her face  Patient currently complains of left-sided headache including along the left medial periorbital region, nausea, and occasional episodes of dizziness  Patient denies any vomiting  Patient has difficulty explaining this dizziness but noted episodic, mainly with movement and not present on initial evaluation  Patient denies any vertigo or lightheadedness, patient denies any syncopal or near syncopal episodes  Patient also notes left-sided chest pain since the fall in the area just inferior to the left breast   Patient notes chronic bilateral hip pain, left greater than right, that is at baseline  ROS: Patient denies any abdominal pain, other extremity pain, fever/chills, visual changes or eye pain, diarrhea, dyspnea, cough, arthralgia, dysuria  All other systems reviewed and are negative  PHYSICAL EXAM:   Primary Exam   A: Patent   B: Bilateral equal breath sounds   C: Pulses intact in all extremities, no active bleeding   D: No signs of gross motor or cognitive neurologic impairment     Secondary Exam   Constitutional: No acute distress  HENT: Normocephalic  Left forehead abrasion, no active bleeding  Normal pharyngeal exam  No hemotympanum, raccoon eyes or Calzada sign  Tenderness along the medial aspect of the left orbit; no discrete tenderness over the nasal bridge, this is slightly medial to this  Patient able to breathe independently out of each nares  No nasal septal hematoma    Eyes: No hyphema  EOMI  PERRL  Neck: Midline tenderness on lower cervical spine, supple  CV: Regular rate and rhythm, no murmur  Peripheral pulses intact  Respiratory: No traumatic findings  Lungs clear to auscultation bilaterally  Chest tender to palpation just inferior to the left breast on the anterior aspect  Abdomen: No traumatic findings  Soft, Non-tender, non-distended  Back: No vertebral tenderness, step-offs or crepitus  Skin: Normal color, warm and dry   Extremities: Non-tender, no deformities  Neuro:  Alert and answers questions appropriately  No dysarthria  Normal tandem gait, including toe walking and heel walking  Normal Romberg exam   No pronator drift  Normal finger to nose  Normal fine motor function with rapid finger movements  Normal hand tap  Cranial nerves II through XII grossly intact  Visual fields grossly intact  Upper and lower motor strength 5/5 and symmetric  Normal light touch sensory exam    Normal DTRs  Medical Decision Making     69-year-old female presenting status post fall 2 days ago  Patient notes fall secondary to loss of balance when bending over on an incline  Denies preceding symptoms  Likely secondary to mechanical insult  Patient notes persistent symptoms secondary to this  Patient is not on any anticoagulation with symptoms started 2 days ago so will defer trauma evaluation but obtain emergent imaging the patient's head considering possibility of intracranial issue  Patient's abrasion is outside the window for surgical closure  Will provide local wound care  Will update patient's tetanus vaccination  Regarding patient's traumatic injuries:    Head:  Obtain CT imaging of patient's head to evaluate for intracranial pathology  Unable to clear patient's head clinically secondary to her age and symptoms  Patient describes a Susa Mower at the medial aspect of the left eye in the periorbital region  Patient denies any visual changes or eye pain  Patient's eye exam clinically appears normal   There is tenderness along the base of the nasal bridge in this region  Obtain imaging of patient's facial bones  Neck:  Patient with midline tenderness, unable to clear via Nexus criteria  Patient with normal neurologic exam   Obtain CT imaging of patient's cervical spine to evaluate for cervical fracture  Chest:  Patient with discrete tenderness concerning for potential rib fracture  Will obtain CT imaging of patient's chest to evaluate for intrathoracic injury including rib fracture  Patient without discrete chest pain and risk but considering age, obtain single EKG and troponin as per east guidelines to evaluate blunt myocardial injury  Abdomen/pelvis:  Nontender and patient denies pain  Able to clear clinically  Back:  No thoracic or lumbar tenderness or pain  For able to clear clinically  Extremities:  Patient notes chronic hip pain but denies acute change with this, otherwise denies pain and tenderness  Normal gait  Able to clear clinically without imaging  History provided by:  Patient  Fall   Mechanism of injury: fall    Injury location:  Head/neck  Head/neck injury location:  Head  Incident location:  Outdoors  Fall:     Fall occurred:  Standing    Impact surface:  Dirt    Point of impact:  Head  Tetanus status:  Out of date  Associated symptoms: chest pain and headaches    Associated symptoms: no abdominal pain, no back pain, no blindness, no difficulty breathing, no hearing loss, no loss of consciousness, no nausea, no neck pain, no seizures and no vomiting        Prior to Admission Medications   Prescriptions Last Dose Informant Patient Reported? Taking?    PARoxetine (PAXIL) 20 mg tablet  Self No No   Sig: Take 1 tablet (20 mg total) by mouth daily   atorvastatin (LIPITOR) 40 mg tablet   No No   Sig: TAKE 1 TABLET BY MOUTH EVERYDAY AT BEDTIME   metroNIDAZOLE (METROCREAM) 0 75 % cream   No No   Sig: Apply topically 2 (two) times a day zolpidem (AMBIEN) 10 mg tablet  Self No No   Sig: Take 1 tablet (10 mg total) by mouth daily at bedtime as needed for sleep      Facility-Administered Medications: None       Past Medical History:   Diagnosis Date    Depression     Hyperlipemia     Hypertension     Insomnia        No past surgical history on file  Family History   Problem Relation Age of Onset    Cancer Mother     Cancer Father      I have reviewed and agree with the history as documented  E-Cigarette/Vaping     E-Cigarette/Vaping Substances     Social History     Tobacco Use    Smoking status: Never Smoker    Smokeless tobacco: Never Used   Substance Use Topics    Alcohol use: No    Drug use: No       Review of Systems   HENT: Negative for hearing loss  Eyes: Negative for blindness  Cardiovascular: Positive for chest pain  Gastrointestinal: Negative for abdominal pain, nausea and vomiting  Musculoskeletal: Negative for back pain and neck pain  Neurological: Positive for headaches  Negative for seizures and loss of consciousness  All other systems reviewed and are negative  Physical Exam  Physical Exam  Vitals signs reviewed  Constitutional:       Appearance: Normal appearance  HENT:      Head: Normocephalic  Comments: See HPI for details  Eyes:      Extraocular Movements: Extraocular movements intact  Pupils: Pupils are equal, round, and reactive to light  Neck:      Musculoskeletal: Normal range of motion and neck supple  Muscular tenderness present  Cardiovascular:      Rate and Rhythm: Normal rate  Pulmonary:      Effort: Pulmonary effort is normal    Chest:      Chest wall: Tenderness present  Abdominal:      General: There is no distension  Tenderness: There is no abdominal tenderness  Musculoskeletal:         General: No tenderness, deformity or signs of injury  Skin:     General: Skin is warm and dry  Neurological:      General: No focal deficit present        Mental Status: She is alert and oriented to person, place, and time  Cranial Nerves: No cranial nerve deficit  Sensory: No sensory deficit  Motor: No weakness        Coordination: Coordination normal       Gait: Gait normal    Psychiatric:         Mood and Affect: Mood normal          Vital Signs  ED Triage Vitals   Temperature Pulse Respirations Blood Pressure SpO2   08/02/20 2144 08/02/20 2144 08/02/20 2230 08/02/20 2144 08/02/20 2144   98 8 °F (37 1 °C) 74 18 163/74 97 %      Temp Source Heart Rate Source Patient Position - Orthostatic VS BP Location FiO2 (%)   08/02/20 2144 08/02/20 2144 08/02/20 2144 08/02/20 2144 --   Oral Monitor Sitting Right arm       Pain Score       08/02/20 2144       4           Vitals:    08/02/20 2144 08/02/20 2230 08/02/20 2330   BP: 163/74 157/74 150/72   Pulse: 74 62 58   Patient Position - Orthostatic VS: Sitting Lying Lying         Visual Acuity      ED Medications  Medications   tetanus-diphtheria-acellular pertussis (BOOSTRIX) IM injection 0 5 mL (0 5 mL Intramuscular Given 8/2/20 2217)   bacitracin topical ointment 1 large application (1 large application Topical Given 8/2/20 2222)   acetaminophen (TYLENOL) tablet 975 mg (975 mg Oral Given 8/2/20 2250)   iohexol (OMNIPAQUE) 350 MG/ML injection (MULTI-DOSE) 100 mL (100 mL Intravenous Given 8/2/20 2308)       Diagnostic Studies  Results Reviewed     Procedure Component Value Units Date/Time    Troponin I [[id number]]  (Normal) Collected:  08/02/20 2216    Lab Status:  Final result Specimen:  Blood from Arm, Left Updated:  08/02/20 2241     Troponin I <0 02 ng/mL     Basic metabolic panel [[id number]] Collected:  08/02/20 2216    Lab Status:  Final result Specimen:  Blood from Arm, Left Updated:  08/02/20 2233     Sodium 145 mmol/L      Potassium 3 5 mmol/L      Chloride 107 mmol/L      CO2 31 mmol/L      ANION GAP 7 mmol/L      BUN 9 mg/dL      Creatinine 1 08 mg/dL      Glucose 124 mg/dL      Calcium 8 5 mg/dL      eGFR 52 ml/min/1 73sq m     Narrative:       National Kidney Disease Foundation guidelines for Chronic Kidney Disease (CKD):     Stage 1 with normal or high GFR (GFR > 90 mL/min/1 73 square meters)    Stage 2 Mild CKD (GFR = 60-89 mL/min/1 73 square meters)    Stage 3A Moderate CKD (GFR = 45-59 mL/min/1 73 square meters)    Stage 3B Moderate CKD (GFR = 30-44 mL/min/1 73 square meters)    Stage 4 Severe CKD (GFR = 15-29 mL/min/1 73 square meters)    Stage 5 End Stage CKD (GFR <15 mL/min/1 73 square meters)  Note: GFR calculation is accurate only with a steady state creatinine    CBC and differential [[id number]]  (Abnormal) Collected:  08/02/20 2216    Lab Status:  Final result Specimen:  Blood from Arm, Left Updated:  08/02/20 2222     WBC 8 48 Thousand/uL      RBC 4 64 Million/uL      Hemoglobin 12 3 g/dL      Hematocrit 39 4 %      MCV 85 fL      MCH 26 5 pg      MCHC 31 2 g/dL      RDW 13 9 %      MPV 11 5 fL      Platelets 359 Thousands/uL      nRBC 0 /100 WBCs      Neutrophils Relative 56 %      Immat GRANS % 0 %      Lymphocytes Relative 31 %      Monocytes Relative 9 %      Eosinophils Relative 3 %      Basophils Relative 1 %      Neutrophils Absolute 4 84 Thousands/µL      Immature Grans Absolute 0 02 Thousand/uL      Lymphocytes Absolute 2 60 Thousands/µL      Monocytes Absolute 0 72 Thousand/µL      Eosinophils Absolute 0 26 Thousand/µL      Basophils Absolute 0 04 Thousands/µL                  CT head without contrast   Final Result by Hilarie Duverney, DO (08/02 2345)      No acute intracranial abnormality  Workstation performed: [id number]         CT spine cervical without contrast   Final Result by Hilarie Duverney, DO (08/02 2337)      No cervical spine fracture or traumatic malalignment                     Workstation performed: [id number]         CT chest with contrast   Final Result by Hilarie Duverney, DO (08/02 2329)      No acute findings               Workstation performed: [id number]         CT facial bones without contrast   Final Result by Andrey Bahena DO (08/02 2349)      No evidence of acute traumatic injury to the facial bones  Workstation performed: [id number]                    Procedures  Procedures         ED Course  ED Course as of Aug 02 2359   Rajendra Ramírez Aug 02, 2020   2214 EKG demonstrates normal sinus rhythm with no acute ST segment changes  There are nonspecific T-wave findings  in the anterior leads with no prior to compare  200 Patient's CT without acute findings  Discussed potential etiologies symptoms  Discussed head injury precautions and possibility of mild traumatic brain injury  Discussed follow-up with primary care  Discussed continued wound care regarding abrasion on her head  Patient ambulating without difficulty  Patient feels comfortable with discharge and close follow-up with existing primary care  Discussed return precautions in detail  Discussed fall precautions  US AUDIT      Most Recent Value   Initial Alcohol Screen: US AUDIT-C    1  How often do you have a drink containing alcohol?  0 Filed at: 08/02/2020 2144   2  How many drinks containing alcohol do you have on a typical day you are drinking? 0 Filed at: 08/02/2020 2144   3b  FEMALE Any Age, or MALE 65+: How often do you have 4 or more drinks on one occassion? 0 Filed at: 08/02/2020 2144   Audit-C Score  0 Filed at: 08/02/2020 2144              Identification of Seniors at Risk      Most Recent Value   (ISAR) Identification of Seniors at Risk   Before the illness or injury that brought you to the Emergency, did you need someone to help you on a regular basis? 0 Filed at: 08/02/2020 2147   In the last 24 hours, have you needed more help than usual?  0 Filed at: 08/02/2020 2147   Have you been hospitalized for one or more nights during the past 6 months?   0 Filed at: 08/02/2020 2147   In general, do you see well?  0 Filed at: 08/02/2020 2147   In general, do you have serious problems with your memory? 0 Filed at: 08/02/2020 2147   Do you take more than three different medications every day?  0 Filed at: 08/02/2020 2147   ISAR Score  0 Filed at: 08/02/2020 2147          JORGE/DAST-10      Most Recent Value   How many times in the past year have you    Used an illegal drug or used a prescription medication for non-medical reasons? Never Filed at: 08/02/2020 2144                                MDM      Disposition  Final diagnoses:   Head injury   Fall   Forehead abrasion   Chest pain     Time reflects when diagnosis was documented in both MDM as applicable and the Disposition within this note     Time User Action Codes Description Comment    8/2/2020 11:57 PM Lajuanda Negus Add [C38 92LL] Head injury     8/2/2020 11:57 PM Lajuanda Negus Add [D84  EVTG] Fall     8/2/2020 11:57 PM Lajuanda Negus Add [T96 72PD] Forehead abrasion     8/2/2020 11:57 PM Lajuanda Negus Add [R07 9] Chest pain       ED Disposition     ED Disposition Condition Date/Time Comment    Discharge Stable Sun Aug 2, 2020 11:57 PM Ronaldo Fears [place] discharge to home/self care  Follow-up Information     Follow up With Specialties Details Why Rianna Monsalve MD Nephrology Schedule an appointment as soon as possible for a visit in 3 days Follow-up and reassessment  [address]  [address] [facility] Emergency Department Emergency Medicine Go to  If symptoms worsen [address] [place] [id number] [phone] [place] ED, [address], [facility]          Patient's Medications   Discharge Prescriptions    No medications on file     No discharge procedures on file      PDMP Review     None          ED Provider  Electronically Signed by           Yannick Morgan MD  08/02/20 [id number]

## 2020-09-06 DIAGNOSIS — F32.A DEPRESSION, UNSPECIFIED DEPRESSION TYPE: ICD-10-CM

## 2020-09-08 RX ORDER — PAROXETINE HYDROCHLORIDE 20 MG/1
TABLET, FILM COATED ORAL
Qty: 90 TABLET | Refills: 3 | Status: SHIPPED | OUTPATIENT
Start: 2020-09-08 | End: 2021-08-28

## 2020-10-07 DIAGNOSIS — E78.00 HIGH CHOLESTEROL: ICD-10-CM

## 2020-10-07 RX ORDER — ATORVASTATIN CALCIUM 40 MG/1
TABLET, FILM COATED ORAL
Qty: 90 TABLET | Refills: 0 | Status: SHIPPED | OUTPATIENT
Start: 2020-10-07 | End: 2021-01-05

## 2020-12-15 ENCOUNTER — VBI (OUTPATIENT)
Dept: ADMINISTRATIVE | Facility: OTHER | Age: 70
End: 2020-12-15

## 2021-01-05 DIAGNOSIS — E78.00 HIGH CHOLESTEROL: ICD-10-CM

## 2021-01-05 RX ORDER — ATORVASTATIN CALCIUM 40 MG/1
TABLET, FILM COATED ORAL
Qty: 90 TABLET | Refills: 0 | Status: SHIPPED | OUTPATIENT
Start: 2021-01-05 | End: 2021-04-05 | Stop reason: SDUPTHER

## 2021-02-16 ENCOUNTER — TELEPHONE (OUTPATIENT)
Dept: NEPHROLOGY | Facility: CLINIC | Age: 71
End: 2021-02-16

## 2021-02-16 DIAGNOSIS — G47.00 INSOMNIA, UNSPECIFIED TYPE: ICD-10-CM

## 2021-02-16 RX ORDER — ZOLPIDEM TARTRATE 10 MG/1
TABLET ORAL
Qty: 60 TABLET | Refills: 0 | Status: SHIPPED | OUTPATIENT
Start: 2021-02-16 | End: 2021-02-16 | Stop reason: SDUPTHER

## 2021-02-16 RX ORDER — ZOLPIDEM TARTRATE 10 MG/1
10 TABLET ORAL
Qty: 60 TABLET | Refills: 0 | Status: SHIPPED | OUTPATIENT
Start: 2021-02-16 | End: 2021-06-17

## 2021-02-16 RX ORDER — ZOLPIDEM TARTRATE 10 MG/1
10 TABLET ORAL
Qty: 60 TABLET | Refills: 0 | Status: SHIPPED | OUTPATIENT
Start: 2021-02-16 | End: 2021-02-16 | Stop reason: SDUPTHER

## 2021-02-16 NOTE — TELEPHONE ENCOUNTER
Patient of Dr Kristian Chapman called stating that she needs a refill for her Zolpidem (Ambien) 10mg once at bedtime  Patient is stating that she does not have any left  Please send Rx to [facility] @ [phone]  If any questions please call patient at [phone]   Hayde Ramirez,

## 2021-03-09 DIAGNOSIS — Z23 ENCOUNTER FOR IMMUNIZATION: ICD-10-CM

## 2021-04-04 DIAGNOSIS — E78.00 HIGH CHOLESTEROL: ICD-10-CM

## 2021-04-05 RX ORDER — ATORVASTATIN CALCIUM 40 MG/1
TABLET, FILM COATED ORAL
Qty: 90 TABLET | Refills: 0 | Status: SHIPPED | OUTPATIENT
Start: 2021-04-05 | End: 2021-07-02

## 2021-05-13 ENCOUNTER — VBI (OUTPATIENT)
Dept: ADMINISTRATIVE | Facility: OTHER | Age: 71
End: 2021-05-13

## 2021-06-14 DIAGNOSIS — G47.00 INSOMNIA, UNSPECIFIED TYPE: Primary | ICD-10-CM

## 2021-06-14 DIAGNOSIS — F32.A DEPRESSION, UNSPECIFIED DEPRESSION TYPE: ICD-10-CM

## 2021-06-14 RX ORDER — ZOLPIDEM TARTRATE 10 MG/1
10 TABLET ORAL
Qty: 30 TABLET | Refills: 0 | Status: SHIPPED | OUTPATIENT
Start: 2021-06-14 | End: 2021-06-15 | Stop reason: SDUPTHER

## 2021-06-15 DIAGNOSIS — G47.00 INSOMNIA, UNSPECIFIED TYPE: ICD-10-CM

## 2021-06-15 RX ORDER — ZOLPIDEM TARTRATE 10 MG/1
10 TABLET ORAL
Qty: 30 TABLET | Refills: 0 | Status: SHIPPED | OUTPATIENT
Start: 2021-06-15 | End: 2021-08-16 | Stop reason: SDUPTHER

## 2021-06-17 DIAGNOSIS — G47.00 INSOMNIA, UNSPECIFIED TYPE: ICD-10-CM

## 2021-06-17 RX ORDER — ZOLPIDEM TARTRATE 10 MG/1
10 TABLET ORAL
Qty: 60 TABLET | Refills: 0 | Status: CANCELLED | OUTPATIENT
Start: 2021-06-17

## 2021-06-17 NOTE — TELEPHONE ENCOUNTER
Pt called pharmacy [facility] in [address] [place] [address],[address] stating that she gets zolpidem 10 mg at bedtime as needed for a 60 day supply not a 30 day supply as sent previously   Please advise

## 2021-07-06 ENCOUNTER — APPOINTMENT (OUTPATIENT)
Dept: LAB | Facility: CLINIC | Age: 71
End: 2021-07-06
Payer: COMMERCIAL

## 2021-07-06 DIAGNOSIS — R73.9 HYPERGLYCEMIA: ICD-10-CM

## 2021-07-06 DIAGNOSIS — E78.00 HIGH CHOLESTEROL: ICD-10-CM

## 2021-07-06 PROCEDURE — 80061 LIPID PANEL: CPT

## 2021-07-06 PROCEDURE — 36415 COLL VENOUS BLD VENIPUNCTURE: CPT

## 2021-07-06 PROCEDURE — 84443 ASSAY THYROID STIM HORMONE: CPT

## 2021-07-06 PROCEDURE — 80048 BASIC METABOLIC PNL TOTAL CA: CPT

## 2021-07-06 PROCEDURE — 83036 HEMOGLOBIN GLYCOSYLATED A1C: CPT

## 2021-07-06 PROCEDURE — 81003 URINALYSIS AUTO W/O SCOPE: CPT

## 2021-07-06 PROCEDURE — 85027 COMPLETE CBC AUTOMATED: CPT

## 2021-07-06 PROCEDURE — 80076 HEPATIC FUNCTION PANEL: CPT

## 2021-07-07 LAB
ALBUMIN SERPL BCP-MCNC: 3.5 G/DL (ref 3.5–5)
ALP SERPL-CCNC: 90 U/L (ref 46–116)
ALT SERPL W P-5'-P-CCNC: 18 U/L (ref 12–78)
ANION GAP SERPL CALCULATED.3IONS-SCNC: 4 MMOL/L (ref 4–13)
AST SERPL W P-5'-P-CCNC: 20 U/L (ref 5–45)
BILIRUB DIRECT SERPL-MCNC: 0.16 MG/DL (ref 0–0.2)
BILIRUB SERPL-MCNC: 0.79 MG/DL (ref 0.2–1)
BILIRUB UR QL STRIP: NEGATIVE
BUN SERPL-MCNC: 10 MG/DL (ref 5–25)
CALCIUM SERPL-MCNC: 9.3 MG/DL (ref 8.3–10.1)
CHLORIDE SERPL-SCNC: 106 MMOL/L (ref 100–108)
CHOLEST SERPL-MCNC: 224 MG/DL (ref 50–200)
CLARITY UR: CLEAR
CO2 SERPL-SCNC: 28 MMOL/L (ref 21–32)
COLOR UR: YELLOW
CREAT SERPL-MCNC: 0.84 MG/DL (ref 0.6–1.3)
ERYTHROCYTE [DISTWIDTH] IN BLOOD BY AUTOMATED COUNT: 14.3 % (ref 11.6–15.1)
EST. AVERAGE GLUCOSE BLD GHB EST-MCNC: 128 MG/DL
GFR SERPL CREATININE-BSD FRML MDRD: 70 ML/MIN/1.73SQ M
GLUCOSE P FAST SERPL-MCNC: 83 MG/DL (ref 65–99)
GLUCOSE UR STRIP-MCNC: NEGATIVE MG/DL
HBA1C MFR BLD: 6.1 %
HCT VFR BLD AUTO: 43.5 % (ref 34.8–46.1)
HDLC SERPL-MCNC: 50 MG/DL
HGB BLD-MCNC: 13.3 G/DL (ref 11.5–15.4)
HGB UR QL STRIP.AUTO: NEGATIVE
KETONES UR STRIP-MCNC: NEGATIVE MG/DL
LDLC SERPL CALC-MCNC: 144 MG/DL (ref 0–100)
LEUKOCYTE ESTERASE UR QL STRIP: NEGATIVE
MCH RBC QN AUTO: 26.9 PG (ref 26.8–34.3)
MCHC RBC AUTO-ENTMCNC: 30.6 G/DL (ref 31.4–37.4)
MCV RBC AUTO: 88 FL (ref 82–98)
NITRITE UR QL STRIP: NEGATIVE
NONHDLC SERPL-MCNC: 174 MG/DL
PH UR STRIP.AUTO: 6 [PH]
PLATELET # BLD AUTO: 221 THOUSANDS/UL (ref 149–390)
PMV BLD AUTO: 12.1 FL (ref 8.9–12.7)
POTASSIUM SERPL-SCNC: 4.6 MMOL/L (ref 3.5–5.3)
PROT SERPL-MCNC: 7.8 G/DL (ref 6.4–8.2)
PROT UR STRIP-MCNC: NEGATIVE MG/DL
RBC # BLD AUTO: 4.94 MILLION/UL (ref 3.81–5.12)
SODIUM SERPL-SCNC: 138 MMOL/L (ref 136–145)
SP GR UR STRIP.AUTO: 1.01 (ref 1–1.03)
TRIGL SERPL-MCNC: 149 MG/DL
TSH SERPL DL<=0.05 MIU/L-ACNC: 2.84 UIU/ML (ref 0.36–3.74)
UROBILINOGEN UR QL STRIP.AUTO: 0.2 E.U./DL
WBC # BLD AUTO: 5.51 THOUSAND/UL (ref 4.31–10.16)

## 2021-07-12 ENCOUNTER — TELEPHONE (OUTPATIENT)
Dept: NEPHROLOGY | Facility: CLINIC | Age: 71
End: 2021-07-12

## 2021-07-13 ENCOUNTER — OFFICE VISIT (OUTPATIENT)
Dept: NEPHROLOGY | Facility: CLINIC | Age: 71
End: 2021-07-13
Payer: COMMERCIAL

## 2021-07-13 VITALS
BODY MASS INDEX: 27.13 KG/M2 | WEIGHT: 179 LBS | RESPIRATION RATE: 16 BRPM | HEART RATE: 78 BPM | TEMPERATURE: 97.1 F | HEIGHT: 68 IN

## 2021-07-13 DIAGNOSIS — G47.00 INSOMNIA, UNSPECIFIED TYPE: ICD-10-CM

## 2021-07-13 DIAGNOSIS — F32.A DEPRESSIVE DISORDER: ICD-10-CM

## 2021-07-13 DIAGNOSIS — E78.00 HIGH CHOLESTEROL: ICD-10-CM

## 2021-07-13 DIAGNOSIS — L82.1 SEBORRHEIC KERATOSIS: ICD-10-CM

## 2021-07-13 DIAGNOSIS — M54.16 LUMBAR RADICULOPATHY: Primary | ICD-10-CM

## 2021-07-13 PROCEDURE — 99214 OFFICE O/P EST MOD 30 MIN: CPT | Performed by: INTERNAL MEDICINE

## 2021-07-13 NOTE — ASSESSMENT & PLAN NOTE
Patient is on medication but not very compliant with the diet  So cholesterol is increasing    Diet and exercise discussed with

## 2021-07-13 NOTE — PROGRESS NOTES
INTERNAL MEDICINE OFFICE FOLLOW UP  Havrey Wilkins 69 y o  female MRN: [id number]    Encounter: [id number] 7/13/2021    REASON FOR VISIT: Harvey Wilkins is a 69 y o  female who is here on 7/13/2021 for Follow-up and Hypertension    HPI:     Faith Beasley came in today for comprehensive physical examination  76-year-old woman with history of high cholesterol who also problem the insomnia and depression  Overall she seems to be stable     She is complaining of back pain with radiation to both lower extremity which is getting worse over period of time  Denies any paresthesia or any urinary complaint     No chest pain no palpitation or shortness of breath    Patient overall stable  Does not want any sort of preventive testing      REVIEW OF SYSTEMS:    Review of Systems   Constitutional: Negative for activity change and fatigue  HENT: Negative for congestion and ear discharge  Eyes: Negative for photophobia and pain  Respiratory: Negative for apnea and choking  Cardiovascular: Negative for chest pain and palpitations  Gastrointestinal: Negative for abdominal distention, abdominal pain and blood in stool  Endocrine: Negative for heat intolerance and polyphagia  Genitourinary: Negative for difficulty urinating, flank pain and urgency  Musculoskeletal: Positive for arthralgias and back pain  Negative for neck pain and neck stiffness  Skin: Negative for color change and wound  Allergic/Immunologic: Negative for food allergies and immunocompromised state  Neurological: Negative for seizures and facial asymmetry  Hematological: Negative for adenopathy  Does not bruise/bleed easily  Psychiatric/Behavioral: Positive for sleep disturbance  Negative for self-injury and suicidal ideas           PAST MEDICAL HISTORY:  Past Medical History:   Diagnosis Date    Depression     Hyperlipemia     Hypertension     Insomnia        PAST SURGICAL HISTORY:  Past Surgical History:   Procedure Laterality Date    CHOLECYSTECTOMY      TUBAL LIGATION         SOCIAL HISTORY:  Social History     Substance and Sexual Activity   Alcohol Use No     Social History     Substance and Sexual Activity   Drug Use No     Social History     Tobacco Use   Smoking Status Never Smoker   Smokeless Tobacco Never Used       FAMILY HISTORY:  Family History   Problem Relation Age of Onset    Cancer Mother     Cancer Father        MEDICATIONS:    Current Outpatient Medications:     atorvastatin (LIPITOR) 40 mg tablet, TAKE 1 TABLET BY MOUTH EVERYDAY AT BEDTIME, Disp: 90 tablet, Rfl: 0    metroNIDAZOLE (METROCREAM) 0 75 % cream, Apply topically 2 (two) times a day, Disp: 45 g, Rfl: 0    PARoxetine (PAXIL) 20 mg tablet, TAKE 1 TABLET BY MOUTH EVERY DAY, Disp: 90 tablet, Rfl: 3    zolpidem (AMBIEN) 10 mg tablet, Take 1 tablet (10 mg total) by mouth daily at bedtime as needed for sleep, Disp: 30 tablet, Rfl: 0    PHYSICAL EXAM:  Vitals:    07/13/21 1606   Pulse: 78   Resp: 16   Temp: (!) 97 1 °F (36 2 °C)   TempSrc: Temporal   Weight: 81 2 kg (179 lb)   Height: 5' 7 5" (1 715 m)     Body mass index is 27 62 kg/m²  Physical Exam  Constitutional:       General: She is not in acute distress  Appearance: Normal appearance  She is well-developed  HENT:      Head: Normocephalic  Eyes:      General: No scleral icterus  Conjunctiva/sclera: Conjunctivae normal    Neck:      Vascular: No JVD  Cardiovascular:      Rate and Rhythm: Normal rate and regular rhythm  Heart sounds: Normal heart sounds  Pulmonary:      Effort: Pulmonary effort is normal       Breath sounds: Normal breath sounds  No wheezing  Abdominal:      General: Bowel sounds are normal       Palpations: Abdomen is soft  Tenderness: There is no abdominal tenderness  Musculoskeletal:         General: No swelling  Normal range of motion  Cervical back: Normal range of motion and neck supple  Skin:     General: Skin is warm  Findings: No rash  Neurological:      General: No focal deficit present  Mental Status: She is alert and oriented to person, place, and time  Psychiatric:         Behavior: Behavior normal          LAB RESULTS:  Results for orders placed or performed in visit on 11/83/04   Basic metabolic panel   Result Value Ref Range    Sodium 138 136 - 145 mmol/L    Potassium 4 6 3 5 - 5 3 mmol/L    Chloride 106 100 - 108 mmol/L    CO2 28 21 - 32 mmol/L    ANION GAP 4 4 - 13 mmol/L    BUN 10 5 - 25 mg/dL    Creatinine 0 84 0 60 - 1 30 mg/dL    Glucose, Fasting 83 65 - 99 mg/dL    Calcium 9 3 8 3 - 10 1 mg/dL    eGFR 70 ml/min/1 73sq m   CBC   Result Value Ref Range    WBC 5 51 4 31 - 10 16 Thousand/uL    RBC 4 94 3 81 - 5 12 Million/uL    Hemoglobin 13 3 11 5 - 15 4 g/dL    Hematocrit 43 5 34 8 - 46 1 %    MCV 88 82 - 98 fL    MCH 26 9 26 8 - 34 3 pg    MCHC 30 6 (L) 31 4 - 37 4 g/dL    RDW 14 3 11 6 - 15 1 %    Platelets 898 354 - 526 Thousands/uL    MPV 12 1 8 9 - 12 7 fL   Lipid panel   Result Value Ref Range    Cholesterol 224 (H) 50 - 200 mg/dL    Triglycerides 149 <=150 mg/dL    HDL, Direct 50 >=40 mg/dL    LDL Calculated 144 (H) 0 - 100 mg/dL    Non-HDL-Chol (CHOL-HDL) 174 mg/dl   Hepatic function panel   Result Value Ref Range    Total Bilirubin 0 79 0 20 - 1 00 mg/dL    Bilirubin, Direct 0 16 0 00 - 0 20 mg/dL    Alkaline Phosphatase 90 46 - 116 U/L    AST 20 5 - 45 U/L    ALT 18 12 - 78 U/L    Total Protein 7 8 6 4 - 8 2 g/dL    Albumin 3 5 3 5 - 5 0 g/dL   TSH, 3rd generation   Result Value Ref Range    TSH 3RD GENERATON 2 840 0 358 - 3 740 uIU/mL   Hemoglobin A1C   Result Value Ref Range    Hemoglobin A1C 6 1 (H) Normal 3 8-5 6%; PreDiabetic 5 7-6 4%; Diabetic >=6 5%; Glycemic control for adults with diabetes <7 0% %     mg/dl       ASSESSMENT and PLAN:  Lumbar radiculopathy   Patient does have back pain which is slowly getting worse with more radiation    I will send it neurologist for further evaluation management as patient may need MRI and further testing  If condition get worse will get MRI earlier before neurological evaluation    High cholesterol   Patient is on medication but not very compliant with the diet  So cholesterol is increasing  Diet and exercise discussed with    Depressive disorder   On medication and stable    Insomnia   On medication  Addictive nature of the medication along with side effect including suicidal ideation confusion discussed with the patient  Patient want to stay on this medication     blood work discussed with the patient which is stable  Accept worsening cholesterol  preventive testing including vaccination and colonoscopy  and mammogram offered to patient  but does not want it and refused  I will see her back in 1 year for comprehensive physical examination    Portions of the record may have been created with voice recognition software  Occasional wrong word or "sound a like" substitutions may have occurred due to the inherent limitations of voice recognition software  Read the chart carefully and recognize, using context, where substitutions have occurred  If you have any questions, please contact the dictating provider

## 2021-07-13 NOTE — ASSESSMENT & PLAN NOTE
Patient does have back pain which is slowly getting worse with more radiation  I will send it neurologist for further evaluation management as patient may need MRI and further testing    If condition get worse will get MRI earlier before neurological evaluation

## 2021-07-13 NOTE — ASSESSMENT & PLAN NOTE
On medication  Addictive nature of the medication along with side effect including suicidal ideation confusion discussed with the patient    Patient want to stay on this medication

## 2021-08-11 DIAGNOSIS — G47.00 INSOMNIA, UNSPECIFIED TYPE: ICD-10-CM

## 2021-08-11 RX ORDER — ZOLPIDEM TARTRATE 10 MG/1
10 TABLET ORAL
Qty: 90 TABLET | Refills: 0
Start: 2021-08-11

## 2021-08-11 NOTE — TELEPHONE ENCOUNTER
Called pt and inform her that dr Justus Oliveros is on vacation as being he is her pcp provider on call could not prescribe this for her pt was ok with it and stated she will call back 8/16 when dr Justus Oliveros comes back to the office

## 2021-08-16 ENCOUNTER — TELEPHONE (OUTPATIENT)
Dept: NEPHROLOGY | Facility: CLINIC | Age: 71
End: 2021-08-16

## 2021-08-16 DIAGNOSIS — G47.00 INSOMNIA, UNSPECIFIED TYPE: ICD-10-CM

## 2021-08-16 RX ORDER — ZOLPIDEM TARTRATE 10 MG/1
10 TABLET ORAL
Qty: 30 TABLET | Refills: 0 | Status: SHIPPED | OUTPATIENT
Start: 2021-08-16 | End: 2021-09-15

## 2021-08-16 NOTE — TELEPHONE ENCOUNTER
Patient of Dr Julieta Myers (PCP) called and would like a refill for her Zolpidem (Ambien) 10mg one before bedtime with a 30 day supply  If Rx can be called in or faxed please send or call [facility] [id number], [address] @ [phone]  If any questions please call patient @ [phone]   Santo Arnold,

## 2021-08-16 NOTE — TELEPHONE ENCOUNTER
Patient called and needs Rx refill - she does not have any left  [facility] [address]  Any questions she can be reached at [phone]

## 2021-08-28 DIAGNOSIS — F32.A DEPRESSION, UNSPECIFIED DEPRESSION TYPE: ICD-10-CM

## 2021-08-28 RX ORDER — PAROXETINE HYDROCHLORIDE 20 MG/1
TABLET, FILM COATED ORAL
Qty: 90 TABLET | Refills: 3 | Status: SHIPPED | OUTPATIENT
Start: 2021-08-28

## 2021-09-15 DIAGNOSIS — G47.00 INSOMNIA, UNSPECIFIED TYPE: ICD-10-CM

## 2021-09-15 RX ORDER — ZOLPIDEM TARTRATE 10 MG/1
TABLET ORAL
Qty: 30 TABLET | Refills: 0 | Status: SHIPPED | OUTPATIENT
Start: 2021-09-15 | End: 2021-10-14 | Stop reason: SDUPTHER

## 2021-10-14 DIAGNOSIS — G47.00 INSOMNIA, UNSPECIFIED TYPE: ICD-10-CM

## 2021-10-15 RX ORDER — ZOLPIDEM TARTRATE 10 MG/1
10 TABLET ORAL
Qty: 30 TABLET | Refills: 0 | Status: SHIPPED | OUTPATIENT
Start: 2021-10-15 | End: 2021-11-15 | Stop reason: SDUPTHER

## 2021-11-15 DIAGNOSIS — G47.00 INSOMNIA, UNSPECIFIED TYPE: ICD-10-CM

## 2021-11-15 RX ORDER — ZOLPIDEM TARTRATE 10 MG/1
10 TABLET ORAL
Qty: 30 TABLET | Refills: 1 | Status: SHIPPED | OUTPATIENT
Start: 2021-11-15 | End: 2021-12-07 | Stop reason: SDUPTHER

## 2021-12-07 DIAGNOSIS — G47.00 INSOMNIA, UNSPECIFIED TYPE: ICD-10-CM

## 2021-12-07 DIAGNOSIS — E78.00 HIGH CHOLESTEROL: ICD-10-CM

## 2021-12-07 RX ORDER — ZOLPIDEM TARTRATE 10 MG/1
10 TABLET ORAL
Qty: 30 TABLET | Refills: 1 | Status: SHIPPED | OUTPATIENT
Start: 2021-12-07 | End: 2022-01-12 | Stop reason: SDUPTHER

## 2021-12-07 RX ORDER — ATORVASTATIN CALCIUM 40 MG/1
TABLET, FILM COATED ORAL
Qty: 90 TABLET | Refills: 0 | Status: SHIPPED | OUTPATIENT
Start: 2021-12-07 | End: 2022-02-28

## 2021-12-16 ENCOUNTER — VBI (OUTPATIENT)
Dept: ADMINISTRATIVE | Facility: OTHER | Age: 71
End: 2021-12-16

## 2021-12-30 ENCOUNTER — VBI (OUTPATIENT)
Dept: ADMINISTRATIVE | Facility: OTHER | Age: 71
End: 2021-12-30

## 2022-01-12 DIAGNOSIS — G47.00 INSOMNIA, UNSPECIFIED TYPE: ICD-10-CM

## 2022-01-14 RX ORDER — ZOLPIDEM TARTRATE 10 MG/1
10 TABLET ORAL
Qty: 30 TABLET | Refills: 0 | Status: SHIPPED | OUTPATIENT
Start: 2022-01-14 | End: 2022-03-01 | Stop reason: SDUPTHER

## 2022-02-14 DIAGNOSIS — G47.00 INSOMNIA, UNSPECIFIED TYPE: ICD-10-CM

## 2022-02-14 NOTE — TELEPHONE ENCOUNTER
Pt called to refill zolpidem 10mg as needed  Called the pharmacy at [phone] and verbally refilled zolpidem 10 mg for 15 days  Called and informed pt, pt was very thankful

## 2022-02-28 DIAGNOSIS — E78.00 HIGH CHOLESTEROL: ICD-10-CM

## 2022-02-28 RX ORDER — ATORVASTATIN CALCIUM 40 MG/1
TABLET, FILM COATED ORAL
Qty: 90 TABLET | Refills: 0 | Status: SHIPPED | OUTPATIENT
Start: 2022-02-28 | End: 2022-05-27

## 2022-03-01 DIAGNOSIS — G47.00 INSOMNIA, UNSPECIFIED TYPE: ICD-10-CM

## 2022-03-01 RX ORDER — ZOLPIDEM TARTRATE 10 MG/1
10 TABLET ORAL
Qty: 30 TABLET | Refills: 0 | Status: SHIPPED | OUTPATIENT
Start: 2022-03-01 | End: 2022-03-31

## 2022-03-01 NOTE — TELEPHONE ENCOUNTER
Called LM to inform Pt medication for  Zolpidem(Ambiem)10 Mg tablet was [facility] in [facility]  # [phone] by Dr Paez

## 2022-03-01 NOTE — TELEPHONE ENCOUNTER
Pt called requesting refill for Zolpidem(Ambiem)10 Mg tablet called in to [facility] [address] [facility] [address] [place], [place]  # [phone]

## 2022-03-07 ENCOUNTER — TELEPHONE (OUTPATIENT)
Dept: NEPHROLOGY | Facility: CLINIC | Age: 72
End: 2022-03-07

## 2022-03-07 DIAGNOSIS — J01.90 SUBACUTE SINUSITIS, UNSPECIFIED LOCATION: Primary | ICD-10-CM

## 2022-03-07 RX ORDER — AZITHROMYCIN 250 MG/1
TABLET, FILM COATED ORAL
Qty: 6 TABLET | Refills: 0 | Status: SHIPPED | OUTPATIENT
Start: 2022-03-07 | End: 2022-03-11

## 2022-03-07 NOTE — TELEPHONE ENCOUNTER
Called and informed pt that Dr Marta Hwang called zithromax to the pharmacy  Pt understood and was ok with it

## 2022-03-07 NOTE — TELEPHONE ENCOUNTER
Patient of Dr Wilton Amaya (PCP) called stating that for the past 2-3 days ago she has a sinus infection, with stuff nose, coughing, sneezing, chills, no fever, pressure in the face  Patient did have COVID-19 test done and it was negative  Patient would like to know if Dr Wilton Amaya would call in a Rx or if not what should she take  If Rx is called in please send it to [address] [place] [address] [address]) @ [phone]  Please call patient @ [phone] to advise her if a Rx was called in or not   [facility],

## 2022-03-29 DIAGNOSIS — G47.00 INSOMNIA, UNSPECIFIED TYPE: ICD-10-CM

## 2022-03-31 RX ORDER — ZOLPIDEM TARTRATE 10 MG/1
TABLET ORAL
Qty: 30 TABLET | Refills: 0 | Status: SHIPPED | OUTPATIENT
Start: 2022-03-31 | End: 2022-04-27

## 2022-04-04 ENCOUNTER — VBI (OUTPATIENT)
Dept: ADMINISTRATIVE | Facility: OTHER | Age: 72
End: 2022-04-04

## 2022-04-27 DIAGNOSIS — G47.00 INSOMNIA, UNSPECIFIED TYPE: ICD-10-CM

## 2022-04-27 RX ORDER — ZOLPIDEM TARTRATE 10 MG/1
TABLET ORAL
Qty: 30 TABLET | Refills: 0 | Status: SHIPPED | OUTPATIENT
Start: 2022-04-27 | End: 2022-05-25

## 2022-05-02 ENCOUNTER — TELEPHONE (OUTPATIENT)
Dept: NEPHROLOGY | Facility: CLINIC | Age: 72
End: 2022-05-02

## 2022-05-02 NOTE — TELEPHONE ENCOUNTER
Lm that appt was scheduled for 07/29 from recall list  Asked pt to call us back to confirm  Letter mailed

## 2022-05-02 NOTE — LETTER
Date: 05/02/2022        Dear Claudetta Ganong        We have tried unsuccessfully to contact you by telephone to schedule your follow up appt with Dr Ivonne Soares         We have scheduled you for following date and time:    Date: 07/29/2022 at 11:40  Please contact the office as soon as possible if you have a conflict with the above mentioned appointment  Thank you

## 2022-05-25 DIAGNOSIS — G47.00 INSOMNIA, UNSPECIFIED TYPE: ICD-10-CM

## 2022-05-25 RX ORDER — ZOLPIDEM TARTRATE 10 MG/1
TABLET ORAL
Qty: 30 TABLET | Refills: 0 | Status: SHIPPED | OUTPATIENT
Start: 2022-05-25 | End: 2022-06-21

## 2022-05-27 DIAGNOSIS — E78.00 HIGH CHOLESTEROL: ICD-10-CM

## 2022-05-27 RX ORDER — ATORVASTATIN CALCIUM 40 MG/1
TABLET, FILM COATED ORAL
Qty: 90 TABLET | Refills: 0 | Status: SHIPPED | OUTPATIENT
Start: 2022-05-27

## 2022-06-21 DIAGNOSIS — G47.00 INSOMNIA, UNSPECIFIED TYPE: ICD-10-CM

## 2022-06-21 RX ORDER — ZOLPIDEM TARTRATE 10 MG/1
TABLET ORAL
Qty: 30 TABLET | Refills: 0 | Status: SHIPPED | OUTPATIENT
Start: 2022-06-21 | End: 2022-07-18

## 2022-07-08 ENCOUNTER — VBI (OUTPATIENT)
Dept: ADMINISTRATIVE | Facility: OTHER | Age: 72
End: 2022-07-08

## 2022-07-18 DIAGNOSIS — G47.00 INSOMNIA, UNSPECIFIED TYPE: ICD-10-CM

## 2022-07-18 RX ORDER — ZOLPIDEM TARTRATE 10 MG/1
TABLET ORAL
Qty: 30 TABLET | Refills: 0 | Status: SHIPPED | OUTPATIENT
Start: 2022-07-18 | End: 2022-08-23 | Stop reason: SDUPTHER

## 2022-07-26 ENCOUNTER — APPOINTMENT (OUTPATIENT)
Dept: LAB | Facility: HOSPITAL | Age: 72
End: 2022-07-26
Payer: COMMERCIAL

## 2022-07-26 DIAGNOSIS — E78.00 HIGH CHOLESTEROL: ICD-10-CM

## 2022-07-26 LAB
ALBUMIN SERPL BCP-MCNC: 3.6 G/DL (ref 3.5–5)
ALP SERPL-CCNC: 93 U/L (ref 46–116)
ALT SERPL W P-5'-P-CCNC: 17 U/L (ref 12–78)
ANION GAP SERPL CALCULATED.3IONS-SCNC: 7 MMOL/L (ref 4–13)
AST SERPL W P-5'-P-CCNC: 19 U/L (ref 5–45)
BACTERIA UR QL AUTO: NORMAL /HPF
BASOPHILS # BLD AUTO: 0.04 THOUSANDS/ΜL (ref 0–0.1)
BASOPHILS NFR BLD AUTO: 1 % (ref 0–1)
BILIRUB DIRECT SERPL-MCNC: 0.15 MG/DL (ref 0–0.2)
BILIRUB SERPL-MCNC: 0.51 MG/DL (ref 0.2–1)
BILIRUB UR QL STRIP: NEGATIVE
BUN SERPL-MCNC: 10 MG/DL (ref 5–25)
CALCIUM SERPL-MCNC: 8.8 MG/DL (ref 8.3–10.1)
CHLORIDE SERPL-SCNC: 103 MMOL/L (ref 96–108)
CHOLEST SERPL-MCNC: 195 MG/DL
CLARITY UR: CLEAR
CO2 SERPL-SCNC: 29 MMOL/L (ref 21–32)
COLOR UR: YELLOW
CREAT SERPL-MCNC: 0.86 MG/DL (ref 0.6–1.3)
EOSINOPHIL # BLD AUTO: 0.22 THOUSAND/ΜL (ref 0–0.61)
EOSINOPHIL NFR BLD AUTO: 3 % (ref 0–6)
ERYTHROCYTE [DISTWIDTH] IN BLOOD BY AUTOMATED COUNT: 13.3 % (ref 11.6–15.1)
GFR SERPL CREATININE-BSD FRML MDRD: 67 ML/MIN/1.73SQ M
GLUCOSE P FAST SERPL-MCNC: 112 MG/DL (ref 65–99)
GLUCOSE UR STRIP-MCNC: NEGATIVE MG/DL
HCT VFR BLD AUTO: 41.4 % (ref 34.8–46.1)
HDLC SERPL-MCNC: 47 MG/DL
HGB BLD-MCNC: 13.1 G/DL (ref 11.5–15.4)
HGB UR QL STRIP.AUTO: NEGATIVE
IMM GRANULOCYTES # BLD AUTO: 0.02 THOUSAND/UL (ref 0–0.2)
IMM GRANULOCYTES NFR BLD AUTO: 0 % (ref 0–2)
KETONES UR STRIP-MCNC: NEGATIVE MG/DL
LDLC SERPL CALC-MCNC: 122 MG/DL (ref 0–100)
LEUKOCYTE ESTERASE UR QL STRIP: NEGATIVE
LYMPHOCYTES # BLD AUTO: 2.11 THOUSANDS/ΜL (ref 0.6–4.47)
LYMPHOCYTES NFR BLD AUTO: 32 % (ref 14–44)
MCH RBC QN AUTO: 27 PG (ref 26.8–34.3)
MCHC RBC AUTO-ENTMCNC: 31.6 G/DL (ref 31.4–37.4)
MCV RBC AUTO: 85 FL (ref 82–98)
MONOCYTES # BLD AUTO: 0.42 THOUSAND/ΜL (ref 0.17–1.22)
MONOCYTES NFR BLD AUTO: 6 % (ref 4–12)
NEUTROPHILS # BLD AUTO: 3.87 THOUSANDS/ΜL (ref 1.85–7.62)
NEUTS SEG NFR BLD AUTO: 58 % (ref 43–75)
NITRITE UR QL STRIP: NEGATIVE
NON-SQ EPI CELLS URNS QL MICRO: NORMAL /HPF
NRBC BLD AUTO-RTO: 0 /100 WBCS
PH UR STRIP.AUTO: 6 [PH]
PLATELET # BLD AUTO: 224 THOUSANDS/UL (ref 149–390)
PMV BLD AUTO: 11.1 FL (ref 8.9–12.7)
POTASSIUM SERPL-SCNC: 3.5 MMOL/L (ref 3.5–5.3)
PROT SERPL-MCNC: 7.8 G/DL (ref 6.4–8.4)
PROT UR STRIP-MCNC: NEGATIVE MG/DL
RBC # BLD AUTO: 4.86 MILLION/UL (ref 3.81–5.12)
RBC #/AREA URNS AUTO: NORMAL /HPF
SODIUM SERPL-SCNC: 139 MMOL/L (ref 135–147)
SP GR UR STRIP.AUTO: <=1.005 (ref 1–1.03)
TRIGL SERPL-MCNC: 129 MG/DL
TSH SERPL DL<=0.05 MIU/L-ACNC: 2.95 UIU/ML (ref 0.45–4.5)
UROBILINOGEN UR QL STRIP.AUTO: 0.2 E.U./DL
WBC # BLD AUTO: 6.68 THOUSAND/UL (ref 4.31–10.16)
WBC #/AREA URNS AUTO: NORMAL /HPF

## 2022-07-26 PROCEDURE — 85025 COMPLETE CBC W/AUTO DIFF WBC: CPT

## 2022-07-26 PROCEDURE — 84443 ASSAY THYROID STIM HORMONE: CPT

## 2022-07-26 PROCEDURE — 80076 HEPATIC FUNCTION PANEL: CPT

## 2022-07-26 PROCEDURE — 81001 URINALYSIS AUTO W/SCOPE: CPT

## 2022-07-26 PROCEDURE — 80048 BASIC METABOLIC PNL TOTAL CA: CPT

## 2022-07-26 PROCEDURE — 80061 LIPID PANEL: CPT

## 2022-07-26 PROCEDURE — 36415 COLL VENOUS BLD VENIPUNCTURE: CPT

## 2022-07-29 ENCOUNTER — OFFICE VISIT (OUTPATIENT)
Dept: NEPHROLOGY | Facility: CLINIC | Age: 72
End: 2022-07-29
Payer: COMMERCIAL

## 2022-07-29 VITALS
BODY MASS INDEX: 26.4 KG/M2 | RESPIRATION RATE: 16 BRPM | TEMPERATURE: 97.1 F | SYSTOLIC BLOOD PRESSURE: 120 MMHG | HEIGHT: 68 IN | HEART RATE: 67 BPM | WEIGHT: 174.2 LBS | OXYGEN SATURATION: 94 % | DIASTOLIC BLOOD PRESSURE: 80 MMHG

## 2022-07-29 DIAGNOSIS — R05.9 COUGH: ICD-10-CM

## 2022-07-29 DIAGNOSIS — M54.16 LUMBAR RADICULOPATHY: Primary | ICD-10-CM

## 2022-07-29 DIAGNOSIS — G89.29 CHRONIC PAIN OF BOTH KNEES: ICD-10-CM

## 2022-07-29 DIAGNOSIS — M25.561 CHRONIC PAIN OF BOTH KNEES: ICD-10-CM

## 2022-07-29 DIAGNOSIS — E78.00 HIGH CHOLESTEROL: ICD-10-CM

## 2022-07-29 DIAGNOSIS — F32.A DEPRESSIVE DISORDER: ICD-10-CM

## 2022-07-29 DIAGNOSIS — R73.9 HYPERGLYCEMIA: ICD-10-CM

## 2022-07-29 DIAGNOSIS — G47.00 INSOMNIA, UNSPECIFIED TYPE: ICD-10-CM

## 2022-07-29 DIAGNOSIS — M25.562 CHRONIC PAIN OF BOTH KNEES: ICD-10-CM

## 2022-07-29 PROCEDURE — 99214 OFFICE O/P EST MOD 30 MIN: CPT | Performed by: INTERNAL MEDICINE

## 2022-07-29 NOTE — PROGRESS NOTES
INTERNAL MEDICINE OFFICE FOLLOW UP  Cipriano Askew 67 y o  female MRN: [id number]    Encounter: [id number] 7/29/2022    REASON FOR VISIT: Cipriano Askew is a 67 y o  female who is here on 7/29/2022 for Follow-up and Hypertension    HPI:    Carlos Crews came in today for comprehensive physical examination  [de-identified] year woman with history of depression along with insomnia and high cholesterol     Patient does have chronic back pain and now her knees are hurting too  He has an orthopedic doctor in the past and was advised surgery which she is not willing to do it     Denies any other complaint     No chest pain no palpitation or shortness of breath     No nausea no vomiting      REVIEW OF SYSTEMS:    Review of Systems   Constitutional: Negative for activity change and fatigue  HENT: Negative for congestion and ear discharge  Eyes: Negative for photophobia and pain  Respiratory: Negative for apnea and choking  Cardiovascular: Negative for chest pain and palpitations  Gastrointestinal: Negative for abdominal distention and blood in stool  Endocrine: Negative for heat intolerance and polyphagia  Genitourinary: Negative for flank pain and urgency  Musculoskeletal: Positive for arthralgias and back pain  Negative for neck pain and neck stiffness  Skin: Negative for color change and wound  Allergic/Immunologic: Negative for food allergies and immunocompromised state  Neurological: Negative for seizures and facial asymmetry  Hematological: Negative for adenopathy  Does not bruise/bleed easily  Psychiatric/Behavioral: Negative for self-injury and suicidal ideas           PAST MEDICAL HISTORY:  Past Medical History:   Diagnosis Date    Back pain     Depression     Hyperlipemia     Hypertension     Insomnia        PAST SURGICAL HISTORY:  Past Surgical History:   Procedure Laterality Date    CHOLECYSTECTOMY      TUBAL LIGATION         SOCIAL HISTORY:  Social History Substance and Sexual Activity   Alcohol Use No     Social History     Substance and Sexual Activity   Drug Use No     Social History     Tobacco Use   Smoking Status Never Smoker   Smokeless Tobacco Never Used       FAMILY HISTORY:  Family History   Problem Relation Age of Onset    Cancer Mother     Cancer Father        MEDICATIONS:    Current Outpatient Medications:     atorvastatin (LIPITOR) 40 mg tablet, TAKE 1 TABLET BY MOUTH EVERYDAY AT BEDTIME, Disp: 90 tablet, Rfl: 0    PARoxetine (PAXIL) 20 mg tablet, TAKE 1 TABLET BY MOUTH EVERY DAY, Disp: 90 tablet, Rfl: 3    zolpidem (AMBIEN) 10 mg tablet, TAKE 1 TABLET BY MOUTH DAILY AT BEDTIME AS NEEDED FOR SLEEP , Disp: 30 tablet, Rfl: 0    metroNIDAZOLE (METROCREAM) 0 75 % cream, APPLY TO AFFECTED AREA TWICE A DAY (Patient not taking: No sig reported), Disp: 45 g, Rfl: 0    PHYSICAL EXAM:  Vitals:    07/29/22 0900   BP: 120/80   BP Location: Right arm   Patient Position: Sitting   Pulse: 67   Resp: 16   Temp: (!) 97 1 °F (36 2 °C)   TempSrc: Temporal   SpO2: 94%   Weight: 79 kg (174 lb 3 2 oz)   Height: 5' 8" (1 727 m)     Body mass index is 26 49 kg/m²  Physical Exam  Constitutional:       General: She is not in acute distress  Appearance: She is well-developed  HENT:      Head: Normocephalic  Eyes:      General: No scleral icterus  Conjunctiva/sclera: Conjunctivae normal    Neck:      Vascular: No JVD  Cardiovascular:      Rate and Rhythm: Normal rate  Heart sounds: Normal heart sounds  Pulmonary:      Effort: Pulmonary effort is normal       Breath sounds: Normal breath sounds  No wheezing  Abdominal:      General: Bowel sounds are normal       Palpations: Abdomen is soft  Tenderness: There is no abdominal tenderness  Musculoskeletal:         General: No swelling  Normal range of motion  Cervical back: Neck supple  Skin:     General: Skin is warm  Findings: No rash     Neurological:      General: No focal deficit present  Mental Status: She is alert and oriented to person, place, and time     Psychiatric:         Behavior: Behavior normal          LAB RESULTS:  Results for orders placed or performed in visit on 58/02/78   Basic metabolic panel   Result Value Ref Range    Sodium 139 135 - 147 mmol/L    Potassium 3 5 3 5 - 5 3 mmol/L    Chloride 103 96 - 108 mmol/L    CO2 29 21 - 32 mmol/L    ANION GAP 7 4 - 13 mmol/L    BUN 10 5 - 25 mg/dL    Creatinine 0 86 0 60 - 1 30 mg/dL    Glucose, Fasting 112 (H) 65 - 99 mg/dL    Calcium 8 8 8 3 - 10 1 mg/dL    eGFR 67 ml/min/1 73sq m   CBC and differential   Result Value Ref Range    WBC 6 68 4 31 - 10 16 Thousand/uL    RBC 4 86 3 81 - 5 12 Million/uL    Hemoglobin 13 1 11 5 - 15 4 g/dL    Hematocrit 41 4 34 8 - 46 1 %    MCV 85 82 - 98 fL    MCH 27 0 26 8 - 34 3 pg    MCHC 31 6 31 4 - 37 4 g/dL    RDW 13 3 11 6 - 15 1 %    MPV 11 1 8 9 - 12 7 fL    Platelets 544 465 - 351 Thousands/uL    nRBC 0 /100 WBCs    Neutrophils Relative 58 43 - 75 %    Immat GRANS % 0 0 - 2 %    Lymphocytes Relative 32 14 - 44 %    Monocytes Relative 6 4 - 12 %    Eosinophils Relative 3 0 - 6 %    Basophils Relative 1 0 - 1 %    Neutrophils Absolute 3 87 1 85 - 7 62 Thousands/µL    Immature Grans Absolute 0 02 0 00 - 0 20 Thousand/uL    Lymphocytes Absolute 2 11 0 60 - 4 47 Thousands/µL    Monocytes Absolute 0 42 0 17 - 1 22 Thousand/µL    Eosinophils Absolute 0 22 0 00 - 0 61 Thousand/µL    Basophils Absolute 0 04 0 00 - 0 10 Thousands/µL   Hepatic function panel   Result Value Ref Range    Total Bilirubin 0 51 0 20 - 1 00 mg/dL    Bilirubin, Direct 0 15 0 00 - 0 20 mg/dL    Alkaline Phosphatase 93 46 - 116 U/L    AST 19 5 - 45 U/L    ALT 17 12 - 78 U/L    Total Protein 7 8 6 4 - 8 4 g/dL    Albumin 3 6 3 5 - 5 0 g/dL   Lipid Panel with Direct LDL reflex   Result Value Ref Range    Cholesterol 195 See Comment mg/dL    Triglycerides 129 See Comment mg/dL    HDL, Direct 47 (L) >=50 mg/dL    LDL Calculated 122 (H) 0 - 100 mg/dL   TSH, 3rd generation   Result Value Ref Range    TSH 3RD GENERATON 2 948 0 450 - 4 500 uIU/mL   Urinalysis with microscopic   Result Value Ref Range    Color, UA Yellow     Clarity, UA Clear     Specific Gravity, UA <=1 005 1 003 - 1 030    pH, UA 6 0 4 5, 5 0, 5 5, 6 0, 6 5, 7 0, 7 5, 8 0    Leukocytes, UA Negative Negative    Nitrite, UA Negative Negative    Protein, UA Negative Negative mg/dl    Glucose, UA Negative Negative mg/dl    Ketones, UA Negative Negative mg/dl    Urobilinogen, UA 0 2 0 2, 1 0 E U /dl E U /dl    Bilirubin, UA Negative Negative    Occult Blood, UA Negative Negative    RBC, UA None Seen None Seen, 2-4 /hpf    WBC, UA None Seen None Seen, 2-4, 5-60 /hpf    Epithelial Cells Occasional None Seen, Occasional /hpf    Bacteria, UA None Seen None Seen, Occasional /hpf       ASSESSMENT and PLAN:  Lumbar radiculopathy  Chronic in nature and as per the patient orthopedic advised surgery which she is not willing to do it yet    Chronic pain of both knees  Chronic in nature also and stable overall    Depressive disorder  On medication    High cholesterol  Stable overall advised to continue atorvastatin    Hyperglycemia  Will check a hemoglobin A1c with next blood test    Insomnia  On medication  Everything discussed the patient including blood test   Advised to find another primary doctor  Will see her back in 6 months if necessary  Portions of the record may have been created with voice recognition software  Occasional wrong word or "sound a like" substitutions may have occurred due to the inherent limitations of voice recognition software  Read the chart carefully and recognize, using context, where substitutions have occurred  If you have any questions, please contact the dictating provider

## 2022-07-29 NOTE — ASSESSMENT & PLAN NOTE
Chronic in nature and as per the patient orthopedic advised surgery which she is not willing to do it yet

## 2022-08-23 DIAGNOSIS — G47.00 INSOMNIA, UNSPECIFIED TYPE: ICD-10-CM

## 2022-08-23 NOTE — TELEPHONE ENCOUNTER
Good Morning,      Dr Luis Hoffman patient is calling for a  refill on Zolpidem 10 mg tablet  1 tablet by mouth daily at bedtime as needed for sleep   Patient stated she hasn't found a Primary Doctor yet if you can send and RX to her pharmacy    [facility] [id number] @ [phone]    Thank you

## 2022-08-23 NOTE — TELEPHONE ENCOUNTER
LM that Dr Claudia Roach is on vac till 08/29 and this Rx can be send to the pharmacy when he comes back   Asked pt to call us back at [phone] if any more questions pls let her know that her blood tests were normal.  She should follow the plan as we discuswed and is on her instructions    Thanks  ss

## 2022-08-23 NOTE — TELEPHONE ENCOUNTER
Pt called back and LM to call her back  Called pt and she requested to refill zolpidem saying that has nothing left  Informed pt that Dr Rahul Whitlock is not in the office till 08/29 and I have no other doctors here to sigh the Rx Pt understood and was ok with it

## 2022-08-29 RX ORDER — ZOLPIDEM TARTRATE 10 MG/1
TABLET ORAL
Qty: 30 TABLET | Refills: 0 | Status: SHIPPED | OUTPATIENT
Start: 2022-08-29 | End: 2022-09-28 | Stop reason: SDUPTHER

## 2022-08-29 NOTE — TELEPHONE ENCOUNTER
Called and LM stating that Dr Jamil Templeton will refill zolpidem for only 1 month, pt was advised to find new PCP  Asked pt to call us back at [phone] if any more questions or concerns

## 2022-09-28 DIAGNOSIS — G47.00 INSOMNIA, UNSPECIFIED TYPE: ICD-10-CM

## 2022-09-28 NOTE — TELEPHONE ENCOUNTER
Patient called for refill request on Zolpidem (AMBIEM) 10 mg sent to   [id number]) Phone # [phone]

## 2022-09-29 NOTE — TELEPHONE ENCOUNTER
Called LM for patient to call office to confirm if patient has found another PCP or not as per Dr Paez

## 2022-09-29 NOTE — TELEPHONE ENCOUNTER
She was advised to find another primary care provider  Please ask whether she found somebody or not

## 2022-09-29 NOTE — TELEPHONE ENCOUNTER
Patient called office back and states she does not have a new PCP as of yet, patient request if Dr Paez can sent medication for Zolpidem (AMBIEN) 10 mg to her Pharmacy on file  [address]

## 2022-09-30 RX ORDER — ZOLPIDEM TARTRATE 10 MG/1
TABLET ORAL
Qty: 30 TABLET | Refills: 0 | Status: SHIPPED | OUTPATIENT
Start: 2022-09-30 | End: 2022-10-28

## 2022-09-30 NOTE — TELEPHONE ENCOUNTER
Called LM for patient advised as per Dr Paez he has sent in medication to her pharmacy of request, advised patient per Dr Paez she was advised to get new PCP per there last visit

## 2022-10-18 ENCOUNTER — OFFICE VISIT (OUTPATIENT)
Dept: INTERNAL MEDICINE CLINIC | Facility: CLINIC | Age: 72
End: 2022-10-18
Payer: COMMERCIAL

## 2022-10-18 VITALS
BODY MASS INDEX: 26.31 KG/M2 | WEIGHT: 173.6 LBS | HEIGHT: 68 IN | SYSTOLIC BLOOD PRESSURE: 120 MMHG | HEART RATE: 78 BPM | OXYGEN SATURATION: 98 % | TEMPERATURE: 97.8 F | DIASTOLIC BLOOD PRESSURE: 64 MMHG | RESPIRATION RATE: 18 BRPM

## 2022-10-18 DIAGNOSIS — Z11.59 NEED FOR HEPATITIS C SCREENING TEST: ICD-10-CM

## 2022-10-18 DIAGNOSIS — Z00.00 ENCOUNTER FOR INITIAL ANNUAL WELLNESS VISIT (AWV) IN MEDICARE PATIENT: Primary | ICD-10-CM

## 2022-10-18 DIAGNOSIS — E78.2 MIXED HYPERLIPIDEMIA: ICD-10-CM

## 2022-10-18 DIAGNOSIS — F32.A DEPRESSION, UNSPECIFIED DEPRESSION TYPE: ICD-10-CM

## 2022-10-18 DIAGNOSIS — R73.03 PREDIABETES: ICD-10-CM

## 2022-10-18 DIAGNOSIS — Z78.0 POST-MENOPAUSAL: ICD-10-CM

## 2022-10-18 DIAGNOSIS — G47.00 INSOMNIA, UNSPECIFIED TYPE: ICD-10-CM

## 2022-10-18 PROCEDURE — 99204 OFFICE O/P NEW MOD 45 MIN: CPT | Performed by: FAMILY MEDICINE

## 2022-10-18 PROCEDURE — G0438 PPPS, INITIAL VISIT: HCPCS | Performed by: FAMILY MEDICINE

## 2022-10-18 RX ORDER — PAROXETINE HYDROCHLORIDE 20 MG/1
20 TABLET, FILM COATED ORAL DAILY
Qty: 90 TABLET | Refills: 1 | Status: SHIPPED | OUTPATIENT
Start: 2022-10-18

## 2022-10-18 NOTE — PROGRESS NOTES
Assessment and Plan:     Problem List Items Addressed This Visit        Other    Insomnia    Prediabetes    Relevant Orders    HEMOGLOBIN A1C W/ EAG ESTIMATION    Mixed hyperlipidemia      Other Visit Diagnoses     Encounter for initial annual wellness visit (AWV) in Medicare patient    -  Primary    Depression, unspecified depression type        Relevant Medications    PARoxetine (PAXIL) 20 mg tablet    Need for hepatitis C screening test        Relevant Orders    Hepatitis C Antibody ([facility])    Post-menopausal        Relevant Orders    DXA bone density spine hip and pelvis      dicussed ambien use  Pt denies SE/ADR  Discussed titrating down to 5mg  Will start at next f/u   Discussed paxil use  Amendable to possibly titrating off and using an alternative SSRI  Will start at next f/u   Last a1c 6 1 a year ago  Repeat ordered for continued monitoring  HLD well controlled on statin  Pt to continue  Screening study ordered  BMI Counseling: Body mass index is 26 4 kg/m²  The BMI is above normal  Nutrition recommendations include encouraging healthy choices of fruits and vegetables, limiting drinks that contain sugar and increasing intake of lean protein  Rationale for BMI follow-up plan is due to patient being overweight or obese  Preventive health issues were discussed with patient, and age appropriate screening tests were ordered as noted in patient's After Visit Summary  Personalized health advice and appropriate referrals for health education or preventive services given if needed, as noted in patient's After Visit Summary  History of Present Illness:     Patient presents for a Medicare Wellness Visit      presents to establish care  Talked about depression and insomnia   Last seen dentist a month ago  Needs to see eye doctor  Denies personal hx of CVA or MI     Reviewed 7/2022 labs        Patient Care Team:  Criselda Marinelli DO as PCP - General (Family Medicine)     Review of Systems:     Review of Systems   Constitutional: Negative for fever  Respiratory: Negative for shortness of breath  Cardiovascular: Negative for chest pain  Gastrointestinal: Negative for constipation and diarrhea  Genitourinary: Negative for vaginal bleeding  Musculoskeletal: Positive for back pain  Neurological: Negative for headaches  Psychiatric/Behavioral: Positive for sleep disturbance  Problem List:     Patient Active Problem List   Diagnosis   • Rosacea   • Seborrheic keratosis   • Screening for skin condition   • High cholesterol   • Insomnia   • Depressive disorder   • Migraine   • Cough   • Hyperglycemia   • Lumbar radiculopathy   • Chronic pain of both knees   • Prediabetes   • Mixed hyperlipidemia      Past Medical and Surgical History:     Past Medical History:   Diagnosis Date   • Back pain    • Depression    • Hyperlipemia    • Hypertension    • Insomnia      Past Surgical History:   Procedure Laterality Date   • CHOLECYSTECTOMY     • TUBAL LIGATION        Family History:     Family History   Problem Relation Age of Onset   • Cancer Mother    • Cancer Father       Social History:     Social History     Socioeconomic History   • Marital status: /Civil Union     Spouse name: None   • Number of children: None   • Years of education: None   • Highest education level: None   Occupational History   • None   Tobacco Use   • Smoking status: Never Smoker   • Smokeless tobacco: Former User   Vaping Use   • Vaping Use: Never used   Substance and Sexual Activity   • Alcohol use: No   • Drug use: No   • Sexual activity: None   Other Topics Concern   • None   Social History Narrative   • None     Social Determinants of Health     Financial Resource Strain: Low Risk    • Difficulty of Paying Living Expenses: Not hard at all   Food Insecurity: Not on file   Transportation Needs: No Transportation Needs   • Lack of Transportation (Medical): No   • Lack of Transportation (Non-Medical):  No Physical Activity: Not on file   Stress: Not on file   Social Connections: Not on file   Intimate Partner Violence: Not on file   Housing Stability: Not on file      Medications and Allergies:     Current Outpatient Medications   Medication Sig Dispense Refill   • atorvastatin (LIPITOR) 40 mg tablet TAKE 1 TABLET BY MOUTH EVERYDAY AT BEDTIME 90 tablet 0   • PARoxetine (PAXIL) 20 mg tablet Take 1 tablet (20 mg total) by mouth daily 90 tablet 1   • zolpidem (AMBIEN) 10 mg tablet Taking 1 tablet at bedtime as needed for sleep 30 tablet 0     No current facility-administered medications for this visit  No Known Allergies   Immunizations:     Immunization History   Administered Date(s) Administered   • Tdap 08/02/2020      Health Maintenance:         Topic Date Due   • Hepatitis C Screening  Never done   • Colorectal Cancer Screening  10/18/2023 (Originally 7/17/1995)   • Breast Cancer Screening: Mammogram  10/18/2023 (Originally 7/17/1989)         Topic Date Due   • COVID-19 Vaccine (1) Never done   • Pneumococcal Vaccine: 65+ Years (1 - PCV) Never done      Medicare Screening Tests and Risk Assessments:         Health Risk Assessment:   Patient rates overall health as very good  Patient feels that their physical health rating is same  Patient is very satisfied with their life  Eyesight was rated as slightly worse  Hearing was rated as slightly worse  Patient feels that their emotional and mental health rating is much better  Patients states they are sometimes angry  Patient states they are sometimes unusually tired/fatigued  Pain experienced in the last 7 days has been none  Patient states that she has experienced weight loss or gain in last 6 months  Depression Screening:   PHQ-9 Score: 0      Fall Risk Screening: In the past year, patient has experienced: no history of falling in past year      Urinary Incontinence Screening:   Patient has not leaked urine accidently in the last six months       Home Safety:  Patient does not have trouble with stairs inside or outside of their home  Patient has working smoke alarms and has no working carbon monoxide detector  Home safety hazards include: none  Nutrition:   Current diet is Regular  Medications:   Patient is currently taking over-the-counter supplements  OTC medications include: see medication list  Patient is able to manage medications  motrin    Activities of Daily Living (ADLs)/Instrumental Activities of Daily Living (IADLs):   Walk and transfer into and out of bed and chair?: Yes  Dress and groom yourself?: Yes    Bathe or shower yourself?: Yes    Feed yourself? Yes  Do your laundry/housekeeping?: Yes  Manage your money, pay your bills and track your expenses?: Yes  Make your own meals?: Yes    Do your own shopping?: Yes    Previous Hospitalizations:   Any hospitalizations or ED visits within the last 12 months?: No      Advance Care Planning:   Living will: No    Advanced directive counseling given: Yes    End of Life Decisions reviewed with patient: Yes      PREVENTIVE SCREENINGS      Cardiovascular Screening:    General: Screening Not Indicated and History Lipid Disorder      Diabetes Screening:     General: Screening Current      Breast Cancer Screening:     General: Screening Not Indicated      Cervical Cancer Screening:    General: Screening Not Indicated      Lung Cancer Screening:     General: Screening Not Indicated      Hepatitis C Screening:    General: Risks and Benefits Discussed    Hep C Screening Accepted: Yes      Screening, Brief Intervention, and Referral to Treatment (SBIRT)    Screening  Typical number of drinks in a day: 0  Typical number of drinks in a week: 0  Interpretation: Low risk drinking behavior      Single Item Drug Screening:  How often have you used an illegal drug (including marijuana) or a prescription medication for non-medical reasons in the past year? never    Single Item Drug Screen Score: 0  Interpretation: Negative screen for possible drug use disorder    No exam data present     Physical Exam:     /64 (BP Location: Left arm, Patient Position: Sitting, Cuff Size: Standard)   Pulse 78   Temp 97 8 °F (36 6 °C) (Temporal)   Resp 18   Ht 5' 8" (1 727 m)   Wt 78 7 kg (173 lb 9 6 oz)   SpO2 98%   BMI 26 40 kg/m²     Physical Exam  HENT:      Head: Normocephalic and atraumatic  Right Ear: External ear normal       Left Ear: External ear normal       Nose: No congestion  Mouth/Throat:      Mouth: Mucous membranes are dry  Pharynx: Oropharynx is clear  Comments: Missing dentition  Eyes:      Conjunctiva/sclera: Conjunctivae normal       Pupils: Pupils are equal, round, and reactive to light  Cardiovascular:      Rate and Rhythm: Normal rate and regular rhythm  Heart sounds: No murmur heard  Pulmonary:      Effort: Pulmonary effort is normal       Breath sounds: Normal breath sounds  Abdominal:      General: Bowel sounds are normal       Palpations: Abdomen is soft  Musculoskeletal:      Right lower leg: No edema  Left lower leg: No edema  Neurological:      Mental Status: She is alert and oriented to person, place, and time        Gait: Gait normal    Psychiatric:         Mood and Affect: Mood normal          Behavior: Behavior normal           Jeremiah Aguilar DO

## 2022-10-26 DIAGNOSIS — G47.00 INSOMNIA, UNSPECIFIED TYPE: ICD-10-CM

## 2022-10-28 RX ORDER — ZOLPIDEM TARTRATE 10 MG/1
TABLET ORAL
Qty: 30 TABLET | Refills: 0 | Status: SHIPPED | OUTPATIENT
Start: 2022-10-28

## 2022-11-09 ENCOUNTER — VBI (OUTPATIENT)
Dept: ADMINISTRATIVE | Facility: OTHER | Age: 72
End: 2022-11-09

## 2022-11-15 ENCOUNTER — APPOINTMENT (OUTPATIENT)
Dept: LAB | Facility: CLINIC | Age: 72
End: 2022-11-15

## 2022-11-15 DIAGNOSIS — R73.03 PREDIABETES: ICD-10-CM

## 2022-11-16 ENCOUNTER — TELEPHONE (OUTPATIENT)
Dept: INTERNAL MEDICINE CLINIC | Facility: CLINIC | Age: 72
End: 2022-11-16

## 2022-11-16 LAB
EST. AVERAGE GLUCOSE BLD GHB EST-MCNC: 128 MG/DL
HBA1C MFR BLD: 6.1 %

## 2022-11-16 NOTE — TELEPHONE ENCOUNTER
----- Message from Constantino Billings DO sent at 11/16/2022  2:14 PM EST -----  A1c is unchanged  Prediabetes is stable

## 2022-11-25 DIAGNOSIS — G47.00 INSOMNIA, UNSPECIFIED TYPE: ICD-10-CM

## 2022-11-29 RX ORDER — ZOLPIDEM TARTRATE 10 MG/1
TABLET ORAL
Qty: 30 TABLET | Refills: 0 | Status: SHIPPED | OUTPATIENT
Start: 2022-11-29

## 2022-12-07 ENCOUNTER — TELEPHONE (OUTPATIENT)
Dept: NEPHROLOGY | Facility: CLINIC | Age: 72
End: 2022-12-07

## 2022-12-26 DIAGNOSIS — G47.00 INSOMNIA, UNSPECIFIED TYPE: ICD-10-CM

## 2022-12-29 RX ORDER — ZOLPIDEM TARTRATE 10 MG/1
TABLET ORAL
Qty: 30 TABLET | Refills: 0 | Status: SHIPPED | OUTPATIENT
Start: 2022-12-29

## 2023-01-27 DIAGNOSIS — G47.00 INSOMNIA, UNSPECIFIED TYPE: ICD-10-CM

## 2023-01-27 RX ORDER — ZOLPIDEM TARTRATE 10 MG/1
TABLET ORAL
Qty: 30 TABLET | Refills: 0 | Status: SHIPPED | OUTPATIENT
Start: 2023-01-27

## 2023-02-25 DIAGNOSIS — G47.00 INSOMNIA, UNSPECIFIED TYPE: ICD-10-CM

## 2023-02-27 RX ORDER — ZOLPIDEM TARTRATE 10 MG/1
TABLET ORAL
Qty: 30 TABLET | Refills: 0 | Status: SHIPPED | OUTPATIENT
Start: 2023-02-27

## 2023-03-26 DIAGNOSIS — F32.A DEPRESSION, UNSPECIFIED DEPRESSION TYPE: ICD-10-CM

## 2023-03-26 RX ORDER — PAROXETINE HYDROCHLORIDE 20 MG/1
TABLET, FILM COATED ORAL
Qty: 90 TABLET | Refills: 1 | Status: SHIPPED | OUTPATIENT
Start: 2023-03-26

## 2023-03-27 DIAGNOSIS — G47.00 INSOMNIA, UNSPECIFIED TYPE: ICD-10-CM

## 2023-03-27 RX ORDER — ZOLPIDEM TARTRATE 10 MG/1
TABLET ORAL
Qty: 30 TABLET | Refills: 0 | Status: SHIPPED | OUTPATIENT
Start: 2023-03-27

## 2023-04-26 ENCOUNTER — VBI (OUTPATIENT)
Dept: ADMINISTRATIVE | Facility: OTHER | Age: 73
End: 2023-04-26

## 2023-04-27 DIAGNOSIS — G47.00 INSOMNIA, UNSPECIFIED TYPE: ICD-10-CM

## 2023-04-28 RX ORDER — ZOLPIDEM TARTRATE 10 MG/1
TABLET ORAL
Qty: 30 TABLET | Refills: 0 | Status: SHIPPED | OUTPATIENT
Start: 2023-04-28

## 2023-05-01 ENCOUNTER — OFFICE VISIT (OUTPATIENT)
Age: 73
End: 2023-05-01

## 2023-05-01 ENCOUNTER — APPOINTMENT (OUTPATIENT)
Age: 73
End: 2023-05-01

## 2023-05-01 VITALS
RESPIRATION RATE: 18 BRPM | HEART RATE: 74 BPM | SYSTOLIC BLOOD PRESSURE: 132 MMHG | WEIGHT: 178.4 LBS | TEMPERATURE: 97.7 F | DIASTOLIC BLOOD PRESSURE: 72 MMHG | BODY MASS INDEX: 27.13 KG/M2 | OXYGEN SATURATION: 97 %

## 2023-05-01 DIAGNOSIS — F51.05 INSOMNIA DUE TO OTHER MENTAL DISORDER: ICD-10-CM

## 2023-05-01 DIAGNOSIS — G45.9 TIA (TRANSIENT ISCHEMIC ATTACK): Primary | ICD-10-CM

## 2023-05-01 DIAGNOSIS — R39.89 ABNORMAL URINE COLOR: ICD-10-CM

## 2023-05-01 DIAGNOSIS — M54.16 LUMBAR RADICULOPATHY: ICD-10-CM

## 2023-05-01 DIAGNOSIS — F32.A DEPRESSIVE DISORDER: ICD-10-CM

## 2023-05-01 DIAGNOSIS — F99 INSOMNIA DUE TO OTHER MENTAL DISORDER: ICD-10-CM

## 2023-05-01 PROBLEM — R05.9 COUGH: Status: RESOLVED | Noted: 2019-07-10 | Resolved: 2023-05-01

## 2023-05-01 PROBLEM — Z13.89 SCREENING FOR SKIN CONDITION: Status: RESOLVED | Noted: 2018-04-16 | Resolved: 2023-05-01

## 2023-05-01 NOTE — PROGRESS NOTES
Name: Burt Cotto      : 1950      MRN: [id number]  Encounter Provider: Wilfredo Jeffries DO  Encounter Date: 2023   Encounter department: [address] [place] [address]  TIA (transient ischemic attack)- probable based on history   will evaluate for complete cva via imaging  Pt on statin  encouraged daily asa 81 mg    -     MRI brain wo contrast; Future; Expected date: 2023    2  Depressive disorder- stable on paxil 20mg qd  Pt not amendable to lowering dose at this time  3  Insomnia due to other mental disorder- longstanding issue  Alleviated by ambien 10mg qd  Pt made aware of ADRs  Not amenable to titrate off at this time  4  Lumbar radiculopathy- longstanding issue with intermittent flares  Uses OTC analgesia  Mild- moderately effective for relief  Pt not amendable to pains and sine consult at this time  5  Abnormal urine color- unclear etiology will r/o infection vs elevated urobilinogen vs hematuria with ua  -     UA w Reflex to Microscopic w Reflex to Culture -Lab Collect; Future; Expected date: 2023           Subjective      3 weeks ago pt reports an epeisode of headache, speech change and loss of vision in the right eye  Lasted less than a mintune  Didn't go to the er  denies personal hx of cva  Discussed sleep issues  Stress  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  Depression is heavily triggered by this  Review of Systems   Constitutional: Positive for fatigue  Negative for fever  Eyes: Positive for visual disturbance  Respiratory: Negative for shortness of breath  Cardiovascular: Negative for chest pain  Genitourinary: Negative for dysuria, frequency and urgency  Discolored urine    Neurological: Positive for speech difficulty and headaches  Psychiatric/Behavioral: Positive for dysphoric mood and sleep disturbance         Current Outpatient Medications on File Prior to Visit   Medication Sig    atorvastatin (LIPITOR) 40 mg tablet TAKE 1 TABLET BY MOUTH EVERYDAY AT BEDTIME    PARoxetine (PAXIL) 20 mg tablet TAKE 1 TABLET BY MOUTH EVERY DAY    zolpidem (AMBIEN) 10 mg tablet TAKE 1 TABLET BY MOUTH EVERY DAY AT BEDTIME AS NEEDED FOR SLEEP       Objective     /72 (BP Location: Left arm, Patient Position: Sitting, Cuff Size: Standard)   Pulse 74   Temp 97 7 °F (36 5 °C) (Temporal)   Resp 18   Wt 80 9 kg (178 lb 6 4 oz)   SpO2 97%   BMI 27 13 kg/m²     Physical Exam  HENT:      Head: Normocephalic  Right Ear: External ear normal       Left Ear: External ear normal    Eyes:      Extraocular Movements: Extraocular movements intact  Conjunctiva/sclera: Conjunctivae normal       Pupils: Pupils are equal, round, and reactive to light  Cardiovascular:      Rate and Rhythm: Normal rate and regular rhythm  Heart sounds: No murmur heard  Pulmonary:      Effort: Pulmonary effort is normal    Musculoskeletal:      Right lower leg: No edema  Neurological:      Mental Status: She is alert and oriented to person, place, and time  Cranial Nerves: Cranial nerves 2-12 are intact  No dysarthria or facial asymmetry  Coordination: Romberg sign positive  Gait: Gait is intact     Psychiatric:         Mood and Affect: Mood normal          Behavior: Behavior normal        Rachana Chahal DO

## 2023-05-02 ENCOUNTER — TELEPHONE (OUTPATIENT)
Age: 73
End: 2023-05-02

## 2023-05-02 DIAGNOSIS — R80.9 PROTEINURIA, UNSPECIFIED TYPE: Primary | ICD-10-CM

## 2023-05-02 LAB
BACTERIA UR QL AUTO: ABNORMAL /HPF
BILIRUB UR QL STRIP: NEGATIVE
CLARITY UR: CLEAR
COLOR UR: YELLOW
GLUCOSE UR STRIP-MCNC: NEGATIVE MG/DL
HGB UR QL STRIP.AUTO: NEGATIVE
KETONES UR STRIP-MCNC: NEGATIVE MG/DL
LEUKOCYTE ESTERASE UR QL STRIP: ABNORMAL
MUCOUS THREADS UR QL AUTO: ABNORMAL
NITRITE UR QL STRIP: NEGATIVE
NON-SQ EPI CELLS URNS QL MICRO: ABNORMAL /HPF
PH UR STRIP.AUTO: 6.5 [PH]
PROT UR STRIP-MCNC: ABNORMAL MG/DL
RBC #/AREA URNS AUTO: ABNORMAL /HPF
SP GR UR STRIP.AUTO: 1.02 (ref 1–1.03)
UROBILINOGEN UR STRIP-ACNC: <2 MG/DL
WBC #/AREA URNS AUTO: ABNORMAL /HPF

## 2023-05-02 NOTE — TELEPHONE ENCOUNTER
----- Message from Rossana Kerns DO sent at 5/2/2023  9:50 AM EDT -----  Urine study sow normal colored urine with no sign of infection  There is some protein in the urine  This may be transient due to illness, stress, or dehydration  I recommend repeating the urine study in 4 weeks to monitor for resolution of the protein

## 2023-05-02 NOTE — TELEPHONE ENCOUNTER
As per her consent we are able to leave a voice mail I left detail message with results and providers message and a new order in her chart for urine

## 2023-05-25 DIAGNOSIS — G47.00 INSOMNIA, UNSPECIFIED TYPE: ICD-10-CM

## 2023-05-26 RX ORDER — ZOLPIDEM TARTRATE 10 MG/1
TABLET ORAL
Qty: 30 TABLET | Refills: 0 | Status: SHIPPED | OUTPATIENT
Start: 2023-05-26

## 2023-05-31 ENCOUNTER — HOSPITAL ENCOUNTER (EMERGENCY)
Facility: HOSPITAL | Age: 73
Discharge: HOME | End: 2023-06-01
Attending: EMERGENCY MEDICINE

## 2023-05-31 ENCOUNTER — APPOINTMENT (EMERGENCY)
Dept: RADIOLOGY | Facility: HOSPITAL | Age: 73
End: 2023-05-31

## 2023-05-31 VITALS
DIASTOLIC BLOOD PRESSURE: 77 MMHG | WEIGHT: 175 LBS | OXYGEN SATURATION: 92 % | HEART RATE: 78 BPM | TEMPERATURE: 98.5 F | RESPIRATION RATE: 18 BRPM | BODY MASS INDEX: 26.61 KG/M2 | SYSTOLIC BLOOD PRESSURE: 180 MMHG

## 2023-05-31 DIAGNOSIS — J45.909 ALLERGIC BRONCHITIS: ICD-10-CM

## 2023-05-31 DIAGNOSIS — R05.9 COUGH: Primary | ICD-10-CM

## 2023-05-31 LAB
ALBUMIN SERPL BCP-MCNC: 4.1 G/DL (ref 3.5–5)
ALP SERPL-CCNC: 78 U/L (ref 34–104)
ALT SERPL W P-5'-P-CCNC: 8 U/L (ref 7–52)
ANION GAP SERPL CALCULATED.3IONS-SCNC: 7 MMOL/L (ref 4–13)
AST SERPL W P-5'-P-CCNC: 16 U/L (ref 13–39)
BASOPHILS # BLD AUTO: 0.02 THOUSANDS/ÂΜL (ref 0–0.1)
BASOPHILS NFR BLD AUTO: 0 % (ref 0–1)
BILIRUB SERPL-MCNC: 0.56 MG/DL (ref 0.2–1)
BUN SERPL-MCNC: 8 MG/DL (ref 5–25)
CALCIUM SERPL-MCNC: 9 MG/DL (ref 8.4–10.2)
CHLORIDE SERPL-SCNC: 103 MMOL/L (ref 96–108)
CO2 SERPL-SCNC: 28 MMOL/L (ref 21–32)
CREAT SERPL-MCNC: 0.79 MG/DL (ref 0.6–1.3)
D DIMER PPP FEU-MCNC: 0.51 UG/ML FEU
EOSINOPHIL # BLD AUTO: 0.19 THOUSAND/ÂΜL (ref 0–0.61)
EOSINOPHIL NFR BLD AUTO: 4 % (ref 0–6)
ERYTHROCYTE [DISTWIDTH] IN BLOOD BY AUTOMATED COUNT: 13.7 % (ref 11.6–15.1)
GFR SERPL CREATININE-BSD FRML MDRD: 74 ML/MIN/1.73SQ M
GLUCOSE SERPL-MCNC: 129 MG/DL (ref 65–140)
HCT VFR BLD AUTO: 38.6 % (ref 34.8–46.1)
HGB BLD-MCNC: 12.3 G/DL (ref 11.5–15.4)
IMM GRANULOCYTES # BLD AUTO: 0.01 THOUSAND/UL (ref 0–0.2)
IMM GRANULOCYTES NFR BLD AUTO: 0 % (ref 0–2)
LYMPHOCYTES # BLD AUTO: 1.96 THOUSANDS/ÂΜL (ref 0.6–4.47)
LYMPHOCYTES NFR BLD AUTO: 41 % (ref 14–44)
MCH RBC QN AUTO: 26.7 PG (ref 26.8–34.3)
MCHC RBC AUTO-ENTMCNC: 31.9 G/DL (ref 31.4–37.4)
MCV RBC AUTO: 84 FL (ref 82–98)
MONOCYTES # BLD AUTO: 0.39 THOUSAND/ÂΜL (ref 0.17–1.22)
MONOCYTES NFR BLD AUTO: 8 % (ref 4–12)
NEUTROPHILS # BLD AUTO: 2.21 THOUSANDS/ÂΜL (ref 1.85–7.62)
NEUTS SEG NFR BLD AUTO: 47 % (ref 43–75)
NRBC BLD AUTO-RTO: 0 /100 WBCS
PLATELET # BLD AUTO: 201 THOUSANDS/UL (ref 149–390)
PMV BLD AUTO: 11.3 FL (ref 8.9–12.7)
POTASSIUM SERPL-SCNC: 3.2 MMOL/L (ref 3.5–5.3)
PROT SERPL-MCNC: 7.5 G/DL (ref 6.4–8.4)
RBC # BLD AUTO: 4.61 MILLION/UL (ref 3.81–5.12)
SODIUM SERPL-SCNC: 138 MMOL/L (ref 135–147)
WBC # BLD AUTO: 4.78 THOUSAND/UL (ref 4.31–10.16)

## 2023-05-31 RX ORDER — IPRATROPIUM BROMIDE AND ALBUTEROL SULFATE 2.5; .5 MG/3ML; MG/3ML
3 SOLUTION RESPIRATORY (INHALATION) ONCE
Status: COMPLETED | OUTPATIENT
Start: 2023-05-31 | End: 2023-05-31

## 2023-05-31 RX ORDER — ALBUTEROL SULFATE 2.5 MG/3ML
2.5 SOLUTION RESPIRATORY (INHALATION) ONCE
Status: COMPLETED | OUTPATIENT
Start: 2023-05-31 | End: 2023-05-31

## 2023-05-31 RX ORDER — DEXAMETHASONE 4 MG/1
6 TABLET ORAL ONCE
Status: COMPLETED | OUTPATIENT
Start: 2023-05-31 | End: 2023-05-31

## 2023-05-31 RX ADMIN — DEXAMETHASONE 6 MG: 4 TABLET ORAL at 22:47

## 2023-05-31 RX ADMIN — IPRATROPIUM BROMIDE AND ALBUTEROL SULFATE 3 ML: .5; 3 SOLUTION RESPIRATORY (INHALATION) at 22:47

## 2023-05-31 RX ADMIN — ALBUTEROL SULFATE 2.5 MG: 2.5 SOLUTION RESPIRATORY (INHALATION) at 22:47

## 2023-06-01 ENCOUNTER — APPOINTMENT (EMERGENCY)
Dept: CT IMAGING | Facility: HOSPITAL | Age: 73
End: 2023-06-01

## 2023-06-01 LAB
2HR DELTA HS TROPONIN: <-1 NG/L
ATRIAL RATE: 77 BPM
CARDIAC TROPONIN I PNL SERPL HS: 3 NG/L
CARDIAC TROPONIN I PNL SERPL HS: <2 NG/L
FLUAV RNA RESP QL NAA+PROBE: NEGATIVE
FLUBV RNA RESP QL NAA+PROBE: NEGATIVE
P AXIS: 77 DEGREES
PR INTERVAL: 150 MS
QRS AXIS: 56 DEGREES
QRSD INTERVAL: 72 MS
QT INTERVAL: 376 MS
QTC INTERVAL: 425 MS
RSV RNA RESP QL NAA+PROBE: NEGATIVE
SARS-COV-2 RNA RESP QL NAA+PROBE: NEGATIVE
T WAVE AXIS: 77 DEGREES
VENTRICULAR RATE: 77 BPM

## 2023-06-01 RX ORDER — ALBUTEROL SULFATE 2.5 MG/3ML
2.5 SOLUTION RESPIRATORY (INHALATION) EVERY 6 HOURS PRN
Qty: 75 ML | Refills: 0 | Status: SHIPPED | OUTPATIENT
Start: 2023-06-01

## 2023-06-01 RX ORDER — ALBUTEROL SULFATE 90 UG/1
2 AEROSOL, METERED RESPIRATORY (INHALATION) EVERY 6 HOURS PRN
Qty: 8.5 G | Refills: 0 | Status: SHIPPED | OUTPATIENT
Start: 2023-06-01

## 2023-06-01 RX ORDER — IPRATROPIUM BROMIDE AND ALBUTEROL SULFATE 2.5; .5 MG/3ML; MG/3ML
3 SOLUTION RESPIRATORY (INHALATION) 4 TIMES DAILY
Qty: 360 ML | Refills: 0 | Status: SHIPPED | OUTPATIENT
Start: 2023-06-01 | End: 2023-07-01

## 2023-06-01 RX ORDER — DEXAMETHASONE 6 MG/1
6 TABLET ORAL EVERY OTHER DAY
Qty: 3 TABLET | Refills: 0 | Status: SHIPPED | OUTPATIENT
Start: 2023-06-01 | End: 2023-06-06

## 2023-06-01 RX ADMIN — IOHEXOL 100 ML: 350 INJECTION, SOLUTION INTRAVENOUS at 01:05

## 2023-06-01 NOTE — ED NOTES
Patient transported to [address] [place] [address]  06/01/23 [id number] Chest pain, unspecified type Chest pain, unspecified type

## 2023-06-01 NOTE — ED PROVIDER NOTES
History  Chief Complaint   Patient presents with   • Shortness of Breath     Pt reports cough, wheezing and shortness of breath x 2 days  Pt also c/o associated midsternal chest pain with coughing  54-year-old female patient presents emergency department for evaluation of dyspnea  The patient states she has been coughing and wheezing for several days, she states that she has never wheezed prior to this in her life  Currently the patient is sitting in bed, no apparent distress, coughing frequently but able to speak in full uninterrupted sentences otherwise  The patient has bilateral adventitious breath sounds in full fields  The patient states no fevers, no chills, no chest pains, shortness of breath only with coughing  Patient's physical exam is concerning to me and that she has multiple adventitious breath sounds in all lung fields without a cause or history of the same  The patient states she was never a smoker  Differential diagnosis currently includes was not limited to pneumonia, coronavirus or COVID, upper respiratory tract infection  With the patient's overall lack of abnormalities on lab exams, the patient's chest x-ray which had no acute abnormalities on my interpretation and then the CT scan which also had no acute abnormalities on CT scan but was done because of a slightly elevated D-dimer the patient and I discussed the utility of stay in the hospital versus going home    The patient will be sent home with scheduled nebulizer treatments for the next few days and then follow-up with her primary care doctor and discussion for the utility of seeing a pulmonologist      History provided by:  Patient   used: No    Shortness of Breath  Severity:  Mild  Onset quality:  Gradual  Timing:  Constant  Progression:  Worsening  Chronicity:  New  Context: not animal exposure, not emotional upset, not pollens and not URI    Relieved by:  Nothing  Worsened by:  Nothing  Associated symptoms: no chest pain, no cough, no PND and no swollen glands    Risk factors: no hx of PE/DVT        Prior to Admission Medications   Prescriptions Last Dose Informant Patient Reported? Taking? PARoxetine (PAXIL) 20 mg tablet   No No   Sig: TAKE 1 TABLET BY MOUTH EVERY DAY   atorvastatin (LIPITOR) 40 mg tablet   No No   Sig: TAKE 1 TABLET BY MOUTH EVERYDAY AT BEDTIME   zolpidem (AMBIEN) 10 mg tablet   No No   Sig: TAKE 1 TABLET BY MOUTH AT BEDTIME AS NEEDED FOR SLEEP      Facility-Administered Medications: None       Past Medical History:   Diagnosis Date   • Back pain    • Depression    • Hyperlipemia    • Hypertension    • Insomnia        Past Surgical History:   Procedure Laterality Date   • CHOLECYSTECTOMY     • TUBAL LIGATION         Family History   Problem Relation Age of Onset   • Cancer Mother    • Cancer Father      I have reviewed and agree with the history as documented  E-Cigarette/Vaping   • E-Cigarette Use Never User      E-Cigarette/Vaping Substances   • Nicotine No      Social History     Tobacco Use   • Smoking status: Never   • Smokeless tobacco: Former   Vaping Use   • Vaping Use: Never used   Substance Use Topics   • Alcohol use: No   • Drug use: No       Review of Systems   Respiratory: Positive for shortness of breath  Negative for cough  Cardiovascular: Negative for chest pain and PND  All other systems reviewed and are negative  Physical Exam  Physical Exam  Vitals and nursing note reviewed  Constitutional:       Appearance: She is well-developed  HENT:      Head: Normocephalic and atraumatic  Right Ear: External ear normal       Left Ear: External ear normal    Eyes:      Conjunctiva/sclera: Conjunctivae normal    Neck:      Thyroid: No thyromegaly  Vascular: No JVD  Trachea: No tracheal deviation  Cardiovascular:      Rate and Rhythm: Normal rate  Pulmonary:      Effort: Pulmonary effort is normal       Breath sounds: No stridor   Decreased breath sounds, wheezing, rhonchi and rales present  Abdominal:      General: There is no distension  Palpations: Abdomen is soft  There is no mass  Tenderness: There is no abdominal tenderness  There is no guarding  Hernia: No hernia is present  Musculoskeletal:         General: No tenderness or deformity  Normal range of motion  Lymphadenopathy:      Cervical: No cervical adenopathy  Skin:     General: Skin is warm  Coloration: Skin is not pale  Findings: No erythema or rash  Neurological:      Mental Status: She is alert and oriented to person, place, and time     Psychiatric:         Behavior: Behavior normal          Vital Signs  ED Triage Vitals   Temperature Pulse Respirations Blood Pressure SpO2   05/31/23 2217 05/31/23 2217 05/31/23 2217 05/31/23 2217 05/31/23 2217   98 5 °F (36 9 °C) 78 18 (!) 180/77 92 %      Temp Source Heart Rate Source Patient Position - Orthostatic VS BP Location FiO2 (%)   05/31/23 2217 05/31/23 2217 -- 05/31/23 2217 --   Oral Monitor  Left arm       Pain Score       05/31/23 2218       10 - Worst Possible Pain           Vitals:    05/31/23 2217   BP: (!) 180/77   Pulse: 78         Visual Acuity      ED Medications  Medications   albuterol inhalation solution 2 5 mg (2 5 mg Nebulization Given 5/31/23 2247)   ipratropium-albuterol (DUO-NEB) 0 5-2 5 mg/3 mL inhalation solution 3 mL (3 mL Nebulization Given 5/31/23 2247)   dexamethasone (DECADRON) tablet 6 mg (6 mg Oral Given 5/31/23 2247)   iohexol (OMNIPAQUE) 350 MG/ML injection (SINGLE-DOSE) 100 mL (100 mL Intravenous Given 6/1/23 0105)       Diagnostic Studies  Results Reviewed     Procedure Component Value Units Date/Time    HS Troponin I 2hr [[id number]]  (Normal) Collected: 06/01/23 0123    Lab Status: Final result Specimen: Blood from Arm, Left Updated: 06/01/23 0158     hs TnI 2hr <2 ng/L      Delta 2hr hsTnI <-1 ng/L     FLU/RSV/COVID - if FLU/RSV clinically relevant [[id number]]  (Normal) Collected: 05/31/23 2359    Lab Status: Final result Specimen: Nares from Nose Updated: 06/01/23 0054     SARS-CoV-2 Negative     INFLUENZA A PCR Negative     INFLUENZA B PCR Negative     RSV PCR Negative    Narrative:      FOR PEDIATRIC PATIENTS - copy/paste COVID Guidelines URL to browser: [email or web address]    SARS-CoV-2 assay is a Nucleic Acid Amplification assay intended for the  qualitative detection of nucleic acid from SARS-CoV-2 in nasopharyngeal  swabs  Results are for the presumptive identification of SARS-CoV-2 RNA  Positive results are indicative of infection with SARS-CoV-2, the virus  causing COVID-19, but do not rule out bacterial infection or co-infection  with other viruses  Laboratories within the [place] and its  territories are required to report all positive results to the appropriate  public health authorities  Negative results do not preclude SARS-CoV-2  infection and should not be used as the sole basis for treatment or other  patient management decisions  Negative results must be combined with  clinical observations, patient history, and epidemiological information  This test has not been FDA cleared or approved  This test has been authorized by FDA under an Emergency Use Authorization  (EUA)  This test is only authorized for the duration of time the  declaration that circumstances exist justifying the authorization of the  emergency use of an in vitro diagnostic tests for detection of SARS-CoV-2  virus and/or diagnosis of COVID-19 infection under section 564(b)(1) of  the Act, 21 U  S C  928FII-6(H)(2), unless the authorization is terminated  or revoked sooner  The test has been validated but independent review by FDA  and CLIA is pending  Test performed using [employer] GeneXpert: This RT-PCR assay targets N2,  a region unique to SARS-CoV-2   A conserved region in the E-gene was chosen  for pan-Sarbecovirus detection which includes SARS-CoV-2  According to CMS-2020-01-R, this platform meets the definition of high-throughput technology  HS Troponin I 4hr [[id number]]     Lab Status: No result Specimen: Blood     HS Troponin 0hr (reflex protocol) [[id number]]  (Normal) Collected: 05/31/23 2306    Lab Status: Final result Specimen: Blood from Arm, Left Updated: 06/01/23 0002     hs TnI 0hr 3 ng/L     Comprehensive metabolic panel [[id number]]  (Abnormal) Collected: 05/31/23 2306    Lab Status: Final result Specimen: Blood from Arm, Left Updated: 05/31/23 2358     Sodium 138 mmol/L      Potassium 3 2 mmol/L      Chloride 103 mmol/L      CO2 28 mmol/L      ANION GAP 7 mmol/L      BUN 8 mg/dL      Creatinine 0 79 mg/dL      Glucose 129 mg/dL      Calcium 9 0 mg/dL      AST 16 U/L      ALT 8 U/L      Alkaline Phosphatase 78 U/L      Total Protein 7 5 g/dL      Albumin 4 1 g/dL      Total Bilirubin 0 56 mg/dL      eGFR 74 ml/min/1 73sq m     Narrative:      [facility] guidelines for Chronic Kidney Disease (CKD):   •  Stage 1 with normal or high GFR (GFR > 90 mL/min/1 73 square meters)  •  Stage 2 Mild CKD (GFR = 60-89 mL/min/1 73 square meters)  •  Stage 3A Moderate CKD (GFR = 45-59 mL/min/1 73 square meters)  •  Stage 3B Moderate CKD (GFR = 30-44 mL/min/1 73 square meters)  •  Stage 4 Severe CKD (GFR = 15-29 mL/min/1 73 square meters)  •  Stage 5 End Stage CKD (GFR <15 mL/min/1 73 square meters)  Note: GFR calculation is accurate only with a steady state creatinine    D-dimer, quantitative [[id number]]  (Abnormal) Collected: 05/31/23 2306    Lab Status: Final result Specimen: Blood from Arm, Left Updated: 05/31/23 2357     D-Dimer, Quant 0 51 ug/ml FEU     Narrative:       In the evaluation for possible pulmonary embolism, in the appropriate (Well's Score of 4 or less) patient, the age adjusted d-dimer cutoff for this patient can be calculated as:    Age x 0 01 (in ug/mL) for Age-adjusted D-dimer exclusion threshold for a patient over 50 years  CBC and differential [[id number]]  (Abnormal) Collected: 05/31/23 2306    Lab Status: Final result Specimen: Blood from Arm, Left Updated: 05/31/23 2338     WBC 4 78 Thousand/uL      RBC 4 61 Million/uL      Hemoglobin 12 3 g/dL      Hematocrit 38 6 %      MCV 84 fL      MCH 26 7 pg      MCHC 31 9 g/dL      RDW 13 7 %      MPV 11 3 fL      Platelets 860 Thousands/uL      nRBC 0 /100 WBCs      Neutrophils Relative 47 %      Immat GRANS % 0 %      Lymphocytes Relative 41 %      Monocytes Relative 8 %      Eosinophils Relative 4 %      Basophils Relative 0 %      Neutrophils Absolute 2 21 Thousands/µL      Immature Grans Absolute 0 01 Thousand/uL      Lymphocytes Absolute 1 96 Thousands/µL      Monocytes Absolute 0 39 Thousand/µL      Eosinophils Absolute 0 19 Thousand/µL      Basophils Absolute 0 02 Thousands/µL                  CTA ED chest PE Study   Final Result by Jose Noble MD (06/01 0130)      No evidence of acute pulmonary embolus, thoracic aortic aneurysm or dissection  No acute cardiopulmonary process  Workstation performed: [id number]         XR chest 2 views    (Results Pending)              Procedures  Procedures         ED Course                               SBIRT 22yo+    Flowsheet Row Most Recent Value   Initial Alcohol Screen: US AUDIT-C     1  How often do you have a drink containing alcohol? 0 Filed at: 06/01/2023 0204   2  How many drinks containing alcohol do you have on a typical day you are drinking? 0 Filed at: 06/01/2023 0204   3b  FEMALE Any Age, or MALE 65+: How often do you have 4 or more drinks on one occassion? 0 Filed at: 06/01/2023 0204   Audit-C Score 0 Filed at: 06/01/2023 0204   JORGE: How many times in the past year have you    Used an illegal drug or used a prescription medication for non-medical reasons?  Never Filed at: 06/01/2023 0204                    Medical Decision Making  Allergic bronchitis: acute illness or injury  Cough: acute illness or injury  Amount and/or Complexity of Data Reviewed  Labs: ordered  Radiology: ordered  Risk  Prescription drug management  Disposition  Final diagnoses:   Cough   Allergic bronchitis     Time reflects when diagnosis was documented in both MDM as applicable and the Disposition within this note     Time User Action Codes Description Comment    6/1/2023  1:54 AM Katelyn Bradforder Add [R05 9] Cough     6/1/2023  1:57 AM Katelyn Edgar Add [Z11 049] Allergic bronchitis       ED Disposition     ED Disposition   Discharge    Condition   Stable    Date/Time   Thu Jun 1, 2023  1:54 AM    Comment   Oren Rojo Dirkoconnor discharge to home/self care                 Follow-up Information     Follow up With Specialties Details Why Contact Info    Olivia Duncan DO Family Medicine   [address]  [phone]            Discharge Medication List as of 6/1/2023  1:57 AM      START taking these medications    Details   albuterol (2 5 mg/3 mL) 0 083 % nebulizer solution Take 3 mL (2 5 mg total) by nebulization every 6 (six) hours as needed for wheezing or shortness of breath, Starting u 6/1/2023, Normal      albuterol (ProAir HFA) 90 mcg/act inhaler Inhale 2 puffs every 6 (six) hours as needed for wheezing, Starting u 6/1/2023, Normal      dexamethasone (DECADRON) 6 mg tablet Take 1 tablet (6 mg total) by mouth every other day for 3 doses, Starting Thu 6/1/2023, Until Tue 6/6/2023, Normal      ipratropium-albuterol (DUO-NEB) 0 5-2 5 mg/3 mL nebulizer solution Take 3 mL by nebulization 4 (four) times a day, Starting Thu 6/1/2023, Until Sat 7/1/2023, Normal         CONTINUE these medications which have NOT CHANGED    Details   atorvastatin (LIPITOR) 40 mg tablet TAKE 1 TABLET BY MOUTH EVERYDAY AT BEDTIME, Normal      PARoxetine (PAXIL) 20 mg tablet TAKE 1 TABLET BY MOUTH EVERY DAY, Normal      zolpidem (AMBIEN) 10 mg tablet TAKE 1 TABLET BY MOUTH AT BEDTIME AS NEEDED FOR SLEEP, Normal             No discharge procedures on file      PDMP Review       Value Time User    PDMP Reviewed  Yes 5/26/2023  1:14 PM Herman Hennessy DO          ED Provider  Electronically Signed by           Oscar Hoyt DO  06/01/23 9263

## 2023-06-13 ENCOUNTER — TELEPHONE (OUTPATIENT)
Age: 73
End: 2023-06-13

## 2023-06-13 DIAGNOSIS — F40.240 CLAUSTROPHOBIA: Primary | ICD-10-CM

## 2023-06-13 NOTE — TELEPHONE ENCOUNTER
LVM that she is having a brain scan on 6/23--she is very claustrophobic, so wants to know if you can prescribe something that will relax her

## 2023-06-14 RX ORDER — LORAZEPAM 0.5 MG/1
TABLET ORAL
Qty: 2 TABLET | Refills: 0 | Status: SHIPPED | OUTPATIENT
Start: 2023-06-14

## 2023-06-23 ENCOUNTER — HOSPITAL ENCOUNTER (OUTPATIENT)
Dept: MRI IMAGING | Facility: HOSPITAL | Age: 73
End: 2023-06-23
Payer: COMMERCIAL

## 2023-06-23 DIAGNOSIS — G45.9 TIA (TRANSIENT ISCHEMIC ATTACK): ICD-10-CM

## 2023-06-23 PROCEDURE — 70551 MRI BRAIN STEM W/O DYE: CPT

## 2023-06-23 PROCEDURE — G1004 CDSM NDSC: HCPCS

## 2023-06-26 DIAGNOSIS — G47.00 INSOMNIA, UNSPECIFIED TYPE: ICD-10-CM

## 2023-06-26 RX ORDER — ZOLPIDEM TARTRATE 10 MG/1
TABLET ORAL
Qty: 30 TABLET | Refills: 0 | Status: SHIPPED | OUTPATIENT
Start: 2023-06-26

## 2023-06-27 ENCOUNTER — VBI (OUTPATIENT)
Dept: ADMINISTRATIVE | Facility: OTHER | Age: 73
End: 2023-06-27

## 2023-07-06 ENCOUNTER — TELEPHONE (OUTPATIENT)
Age: 73
End: 2023-07-06

## 2023-07-06 NOTE — TELEPHONE ENCOUNTER
----- Message from Danica Hinson DO sent at 7/6/2023  9:27 AM EDT -----  The mri of the brain doesn't show any acute issue with the brain.

## 2023-07-25 DIAGNOSIS — G47.00 INSOMNIA, UNSPECIFIED TYPE: ICD-10-CM

## 2023-07-26 ENCOUNTER — VBI (OUTPATIENT)
Dept: ADMINISTRATIVE | Facility: OTHER | Age: 73
End: 2023-07-26

## 2023-07-26 RX ORDER — ZOLPIDEM TARTRATE 10 MG/1
TABLET ORAL
Qty: 30 TABLET | Refills: 0 | Status: SHIPPED | OUTPATIENT
Start: 2023-07-26

## 2023-08-14 DIAGNOSIS — Z12.31 ENCOUNTER FOR SCREENING MAMMOGRAM FOR MALIGNANT NEOPLASM OF BREAST: Primary | ICD-10-CM

## 2023-08-25 DIAGNOSIS — G47.00 INSOMNIA, UNSPECIFIED TYPE: ICD-10-CM

## 2023-08-25 RX ORDER — ZOLPIDEM TARTRATE 10 MG/1
TABLET ORAL
Qty: 30 TABLET | Refills: 0 | Status: SHIPPED | OUTPATIENT
Start: 2023-08-25

## 2023-09-14 DIAGNOSIS — E78.00 HIGH CHOLESTEROL: ICD-10-CM

## 2023-09-14 DIAGNOSIS — F32.A DEPRESSION, UNSPECIFIED DEPRESSION TYPE: ICD-10-CM

## 2023-09-14 RX ORDER — ATORVASTATIN CALCIUM 40 MG/1
TABLET, FILM COATED ORAL
Qty: 90 TABLET | Refills: 0 | Status: SHIPPED | OUTPATIENT
Start: 2023-09-14

## 2023-09-14 RX ORDER — PAROXETINE HYDROCHLORIDE 20 MG/1
TABLET, FILM COATED ORAL
Qty: 90 TABLET | Refills: 1 | Status: SHIPPED | OUTPATIENT
Start: 2023-09-14

## 2023-09-22 DIAGNOSIS — G47.00 INSOMNIA, UNSPECIFIED TYPE: ICD-10-CM

## 2023-09-22 RX ORDER — ZOLPIDEM TARTRATE 10 MG/1
TABLET ORAL
Qty: 30 TABLET | Refills: 0 | Status: SHIPPED | OUTPATIENT
Start: 2023-09-22

## 2023-10-21 DIAGNOSIS — G47.00 INSOMNIA, UNSPECIFIED TYPE: ICD-10-CM

## 2023-10-23 DIAGNOSIS — G47.00 INSOMNIA, UNSPECIFIED TYPE: ICD-10-CM

## 2023-10-23 RX ORDER — ZOLPIDEM TARTRATE 10 MG/1
TABLET ORAL
Qty: 30 TABLET | Refills: 0 | Status: SHIPPED | OUTPATIENT
Start: 2023-10-23 | End: 2023-10-23 | Stop reason: SDUPTHER

## 2023-10-23 RX ORDER — ZOLPIDEM TARTRATE 10 MG/1
TABLET ORAL
Qty: 30 TABLET | Refills: 0 | Status: SHIPPED | OUTPATIENT
Start: 2023-10-23

## 2023-11-06 ENCOUNTER — TELEPHONE (OUTPATIENT)
Age: 73
End: 2023-11-06

## 2023-11-06 NOTE — TELEPHONE ENCOUNTER
Unable to find her script for zolpidem (AMBIEN) 10 mg tablet   She thinks she threw it in the garbage, garbage  was today    Requesting cb to see what she should do now?

## 2023-11-15 ENCOUNTER — VBI (OUTPATIENT)
Dept: ADMINISTRATIVE | Facility: OTHER | Age: 73
End: 2023-11-15

## 2023-11-17 DIAGNOSIS — G47.00 INSOMNIA, UNSPECIFIED TYPE: ICD-10-CM

## 2023-11-17 RX ORDER — ZOLPIDEM TARTRATE 10 MG/1
TABLET ORAL
Qty: 30 TABLET | Refills: 0 | Status: SHIPPED | OUTPATIENT
Start: 2023-11-20

## 2023-11-17 NOTE — TELEPHONE ENCOUNTER
Medication has been sent to the pharmacy for refill date of 1120/2023.   Therefore she will be able to pick it up on Monday

## 2023-12-20 ENCOUNTER — TELEPHONE (OUTPATIENT)
Dept: OTHER | Facility: OTHER | Age: 73
End: 2023-12-20

## 2023-12-21 NOTE — TELEPHONE ENCOUNTER
Patient called and canceled her appointment and she will give the office a call back to reschedule her appointment.

## 2023-12-22 DIAGNOSIS — G47.00 INSOMNIA, UNSPECIFIED TYPE: ICD-10-CM

## 2023-12-23 ENCOUNTER — NURSE TRIAGE (OUTPATIENT)
Dept: OTHER | Facility: OTHER | Age: 73
End: 2023-12-23

## 2023-12-23 NOTE — TELEPHONE ENCOUNTER
"Regarding: Meds not at pharmacy  ----- Message from Iqra Wilburn sent at 12/23/2023 11:22 AM EST -----  \"I called in yesterday for my ambien and it's not at the pharmacy. I can't go cold turkey.\"    "

## 2023-12-23 NOTE — TELEPHONE ENCOUNTER
Reason for Disposition  • Message left on unidentified voice mail.  Phone number verified.    Protocols used: No Contact or Duplicate Contact Call-ADULT-

## 2023-12-26 RX ORDER — ZOLPIDEM TARTRATE 10 MG/1
TABLET ORAL
Qty: 30 TABLET | Refills: 0 | Status: SHIPPED | OUTPATIENT
Start: 2023-12-26

## 2024-01-25 DIAGNOSIS — E78.00 HIGH CHOLESTEROL: ICD-10-CM

## 2024-01-25 DIAGNOSIS — G47.00 INSOMNIA, UNSPECIFIED TYPE: ICD-10-CM

## 2024-01-26 RX ORDER — ATORVASTATIN CALCIUM 40 MG/1
40 TABLET, FILM COATED ORAL
Qty: 90 TABLET | Refills: 0 | Status: SHIPPED | OUTPATIENT
Start: 2024-01-26

## 2024-01-26 RX ORDER — ZOLPIDEM TARTRATE 10 MG/1
TABLET ORAL
Qty: 30 TABLET | Refills: 0 | Status: SHIPPED | OUTPATIENT
Start: 2024-01-26

## 2024-02-25 DIAGNOSIS — G47.00 INSOMNIA, UNSPECIFIED TYPE: ICD-10-CM

## 2024-02-26 RX ORDER — ZOLPIDEM TARTRATE 10 MG/1
TABLET ORAL
Qty: 30 TABLET | Refills: 0 | Status: SHIPPED | OUTPATIENT
Start: 2024-02-26

## 2024-02-26 NOTE — TELEPHONE ENCOUNTER
Cancelled last 3 appointments (10/25/23, 10/31/2023, 12/21/2023)    Last visit: 5/1/2023    Health Maintenance Due   Topic Date Due    Hepatitis C Screening  Never done    Breast Cancer Screening: Mammogram  Never done    Colorectal Cancer Screening  Never done    Osteoporosis Screening  Never done    Medicare Annual Wellness Visit (AWV)  10/18/2023    Depression Screening  05/01/2024

## 2024-03-19 DIAGNOSIS — F32.A DEPRESSION, UNSPECIFIED DEPRESSION TYPE: ICD-10-CM

## 2024-03-19 RX ORDER — PAROXETINE HYDROCHLORIDE 20 MG/1
TABLET, FILM COATED ORAL
Qty: 90 TABLET | Refills: 0 | Status: SHIPPED | OUTPATIENT
Start: 2024-03-19

## 2024-03-26 ENCOUNTER — TELEPHONE (OUTPATIENT)
Age: 74
End: 2024-03-26

## 2024-03-26 DIAGNOSIS — G47.00 INSOMNIA, UNSPECIFIED TYPE: ICD-10-CM

## 2024-03-26 NOTE — TELEPHONE ENCOUNTER
Pt is waiting for her Ambien.  I see it's pending can someone process since Albino is out again tomorrow.  Pt can't wait.

## 2024-03-27 ENCOUNTER — TELEPHONE (OUTPATIENT)
Age: 74
End: 2024-03-27

## 2024-03-27 NOTE — TELEPHONE ENCOUNTER
[facility] [address]     Zolpidem Tartrate was sent via [employer]

## 2024-03-28 DIAGNOSIS — G47.00 INSOMNIA, UNSPECIFIED TYPE: ICD-10-CM

## 2024-03-28 RX ORDER — ZOLPIDEM TARTRATE 10 MG/1
TABLET ORAL
Qty: 30 TABLET | Refills: 0 | Status: SHIPPED | OUTPATIENT
Start: 2024-03-28 | End: 2024-03-31

## 2024-03-28 RX ORDER — ZOLPIDEM TARTRATE 10 MG/1
TABLET ORAL
Qty: 30 TABLET | Refills: 0 | OUTPATIENT
Start: 2024-03-28

## 2024-03-29 ENCOUNTER — APPOINTMENT (EMERGENCY)
Dept: CT IMAGING | Facility: HOSPITAL | Age: 74
DRG: 918 | End: 2024-03-29
Payer: COMMERCIAL

## 2024-03-29 ENCOUNTER — APPOINTMENT (EMERGENCY)
Dept: RADIOLOGY | Facility: HOSPITAL | Age: 74
DRG: 918 | End: 2024-03-29
Payer: COMMERCIAL

## 2024-03-29 ENCOUNTER — TELEPHONE (OUTPATIENT)
Dept: PSYCHIATRY | Facility: CLINIC | Age: 74
End: 2024-03-29

## 2024-03-29 ENCOUNTER — HOSPITAL ENCOUNTER (INPATIENT)
Facility: HOSPITAL | Age: 74
LOS: 1 days | Discharge: HOME | DRG: 918 | End: 2024-03-31
Attending: EMERGENCY MEDICINE | Admitting: FAMILY MEDICINE
Payer: COMMERCIAL

## 2024-03-29 DIAGNOSIS — T45.0X1A: Primary | ICD-10-CM

## 2024-03-29 DIAGNOSIS — R09.02 HYPOXIA: ICD-10-CM
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
## 2024-03-29 LAB
ALBUMIN SERPL BCP-MCNC: 3.7 G/DL (ref 3.5–5)
ALP SERPL-CCNC: 92 U/L (ref 34–104)
ALT SERPL W P-5'-P-CCNC: 9 U/L (ref 7–52)
AMPHETAMINES SERPL QL SCN: NEGATIVE
ANION GAP SERPL CALCULATED.3IONS-SCNC: 9 MMOL/L (ref 4–13)
APAP SERPL-MCNC: <2 UG/ML (ref 10–20)
AST SERPL W P-5'-P-CCNC: 19 U/L (ref 13–39)
ATRIAL RATE: 61 BPM
BACTERIA UR QL AUTO: ABNORMAL /HPF
BARBITURATES UR QL: NEGATIVE
BASOPHILS # BLD AUTO: 0.05 THOUSANDS/ÂΜL (ref 0–0.1)
BASOPHILS NFR BLD AUTO: 1 % (ref 0–1)
BENZODIAZ UR QL: NEGATIVE
BILIRUB DIRECT SERPL-MCNC: 0.16 MG/DL (ref 0–0.2)
BILIRUB SERPL-MCNC: 0.93 MG/DL (ref 0.2–1)
BILIRUB UR QL STRIP: NEGATIVE
BUN SERPL-MCNC: 9 MG/DL (ref 5–25)
CALCIUM SERPL-MCNC: 9.2 MG/DL (ref 8.4–10.2)
CARDIAC TROPONIN I PNL SERPL HS: <2 NG/L
CHLORIDE SERPL-SCNC: 103 MMOL/L (ref 96–108)
CLARITY UR: CLEAR
CO2 SERPL-SCNC: 28 MMOL/L (ref 21–32)
COCAINE UR QL: NEGATIVE
COLOR UR: ABNORMAL
CREAT SERPL-MCNC: 0.68 MG/DL (ref 0.6–1.3)
EOSINOPHIL # BLD AUTO: 0.27 THOUSAND/ÂΜL (ref 0–0.61)
EOSINOPHIL NFR BLD AUTO: 6 % (ref 0–6)
ERYTHROCYTE [DISTWIDTH] IN BLOOD BY AUTOMATED COUNT: 13.5 % (ref 11.6–15.1)
ETHANOL SERPL-MCNC: <10 MG/DL
GFR SERPL CREATININE-BSD FRML MDRD: 86 ML/MIN/1.73SQ M
GLUCOSE SERPL-MCNC: 113 MG/DL (ref 65–140)
GLUCOSE UR STRIP-MCNC: NEGATIVE MG/DL
HCT VFR BLD AUTO: 39.5 % (ref 34.8–46.1)
HGB BLD-MCNC: 12.3 G/DL (ref 11.5–15.4)
HGB UR QL STRIP.AUTO: NEGATIVE
IMM GRANULOCYTES # BLD AUTO: 0.01 THOUSAND/UL (ref 0–0.2)
IMM GRANULOCYTES NFR BLD AUTO: 0 % (ref 0–2)
KETONES UR STRIP-MCNC: NEGATIVE MG/DL
LEUKOCYTE ESTERASE UR QL STRIP: ABNORMAL
LYMPHOCYTES # BLD AUTO: 1.23 THOUSANDS/ÂΜL (ref 0.6–4.47)
LYMPHOCYTES NFR BLD AUTO: 28 % (ref 14–44)
MAGNESIUM SERPL-MCNC: 2.1 MG/DL (ref 1.9–2.7)
MCH RBC QN AUTO: 25.5 PG (ref 26.8–34.3)
MCHC RBC AUTO-ENTMCNC: 31.1 G/DL (ref 31.4–37.4)
MCV RBC AUTO: 82 FL (ref 82–98)
METHADONE UR QL: NEGATIVE
MONOCYTES # BLD AUTO: 0.43 THOUSAND/ÂΜL (ref 0.17–1.22)
MONOCYTES NFR BLD AUTO: 10 % (ref 4–12)
MUCOUS THREADS UR QL AUTO: ABNORMAL
NEUTROPHILS # BLD AUTO: 2.35 THOUSANDS/ÂΜL (ref 1.85–7.62)
NEUTS SEG NFR BLD AUTO: 55 % (ref 43–75)
NITRITE UR QL STRIP: NEGATIVE
NON-SQ EPI CELLS URNS QL MICRO: ABNORMAL /HPF
NRBC BLD AUTO-RTO: 0 /100 WBCS
OPIATES UR QL SCN: NEGATIVE
OXYCODONE+OXYMORPHONE UR QL SCN: NEGATIVE
P AXIS: 57 DEGREES
PCP UR QL: NEGATIVE
PH UR STRIP.AUTO: 6 [PH]
PLATELET # BLD AUTO: 225 THOUSANDS/UL (ref 149–390)
PMV BLD AUTO: 11.5 FL (ref 8.9–12.7)
POTASSIUM SERPL-SCNC: 3.6 MMOL/L (ref 3.5–5.3)
PR INTERVAL: 170 MS
PROT SERPL-MCNC: 6.7 G/DL (ref 6.4–8.4)
PROT UR STRIP-MCNC: NEGATIVE MG/DL
QRS AXIS: 26 DEGREES
QRSD INTERVAL: 78 MS
QT INTERVAL: 444 MS
QTC INTERVAL: 446 MS
RBC # BLD AUTO: 4.82 MILLION/UL (ref 3.81–5.12)
RBC #/AREA URNS AUTO: ABNORMAL /HPF
SALICYLATES SERPL-MCNC: <5 MG/DL (ref 3–20)
SODIUM SERPL-SCNC: 140 MMOL/L (ref 135–147)
SP GR UR STRIP.AUTO: 1.01 (ref 1–1.03)
T WAVE AXIS: 171 DEGREES
THC UR QL: NEGATIVE
UROBILINOGEN UR STRIP-ACNC: <2 MG/DL
VENTRICULAR RATE: 61 BPM
WBC # BLD AUTO: 4.34 THOUSAND/UL (ref 4.31–10.16)
WBC #/AREA URNS AUTO: ABNORMAL /HPF

## 2024-03-29 PROCEDURE — 85025 COMPLETE CBC W/AUTO DIFF WBC: CPT | Performed by: EMERGENCY MEDICINE

## 2024-03-29 PROCEDURE — 80307 DRUG TEST PRSMV CHEM ANLYZR: CPT | Performed by: EMERGENCY MEDICINE

## 2024-03-29 PROCEDURE — 80179 DRUG ASSAY SALICYLATE: CPT | Performed by: EMERGENCY MEDICINE

## 2024-03-29 PROCEDURE — 93005 ELECTROCARDIOGRAM TRACING: CPT

## 2024-03-29 PROCEDURE — 99285 EMERGENCY DEPT VISIT HI MDM: CPT | Performed by: EMERGENCY MEDICINE

## 2024-03-29 PROCEDURE — 80143 DRUG ASSAY ACETAMINOPHEN: CPT | Performed by: EMERGENCY MEDICINE

## 2024-03-29 PROCEDURE — 70450 CT HEAD/BRAIN W/O DYE: CPT

## 2024-03-29 PROCEDURE — 99204 OFFICE O/P NEW MOD 45 MIN: CPT | Performed by: STUDENT IN AN ORGANIZED HEALTH CARE EDUCATION/TRAINING PROGRAM

## 2024-03-29 PROCEDURE — 80076 HEPATIC FUNCTION PANEL: CPT | Performed by: EMERGENCY MEDICINE

## 2024-03-29 PROCEDURE — 72131 CT LUMBAR SPINE W/O DYE: CPT

## 2024-03-29 PROCEDURE — 83735 ASSAY OF MAGNESIUM: CPT | Performed by: EMERGENCY MEDICINE

## 2024-03-29 PROCEDURE — 81001 URINALYSIS AUTO W/SCOPE: CPT | Performed by: EMERGENCY MEDICINE

## 2024-03-29 PROCEDURE — 84484 ASSAY OF TROPONIN QUANT: CPT | Performed by: EMERGENCY MEDICINE

## 2024-03-29 PROCEDURE — 96360 HYDRATION IV INFUSION INIT: CPT

## 2024-03-29 PROCEDURE — 80048 BASIC METABOLIC PNL TOTAL CA: CPT | Performed by: EMERGENCY MEDICINE

## 2024-03-29 PROCEDURE — 36415 COLL VENOUS BLD VENIPUNCTURE: CPT | Performed by: EMERGENCY MEDICINE

## 2024-03-29 PROCEDURE — 99223 1ST HOSP IP/OBS HIGH 75: CPT | Performed by: FAMILY MEDICINE

## 2024-03-29 PROCEDURE — 82077 ASSAY SPEC XCP UR&BREATH IA: CPT | Performed by: EMERGENCY MEDICINE

## 2024-03-29 PROCEDURE — 71045 X-RAY EXAM CHEST 1 VIEW: CPT

## 2024-03-29 PROCEDURE — 93010 ELECTROCARDIOGRAM REPORT: CPT | Performed by: INTERNAL MEDICINE

## 2024-03-29 PROCEDURE — 99285 EMERGENCY DEPT VISIT HI MDM: CPT

## 2024-03-29 PROCEDURE — 96361 HYDRATE IV INFUSION ADD-ON: CPT

## 2024-03-29 RX ORDER — ALBUTEROL SULFATE 2.5 MG/3ML
2.5 SOLUTION RESPIRATORY (INHALATION) EVERY 6 HOURS PRN
Status: DISCONTINUED | OUTPATIENT
Start: 2024-03-29 | End: 2024-03-31

## 2024-03-29 RX ADMIN — SODIUM CHLORIDE 1000 ML: 0.9 INJECTION, SOLUTION INTRAVENOUS at 11:08

## 2024-03-29 NOTE — CONSULTS
TeleConsultation - Behavioral Health   Marie Pope 73 y.o. female MRN: [id number]  Unit/Bed#: ED 12 Encounter: [id number]        REQUIRED DOCUMENTATION:     1. This service was provided via Telemedicine.  2. Provider located at [place].  3. TeleMed provider: Leona Farris MD.  4. Identify all parties in room with patient during tele consult:  Sitter  5.Patient was then informed that this was a Telemedicine visit and that the exam was being conducted confidentially over secure lines. My office door was closed. No one else was in the room.  Patient acknowledged consent and understanding of privacy and security of the Telemedicine visit, and gave us permission to have the assistant stay in the room in order to assist with the history and to conduct the exam.  I informed the patient that I have reviewed their record in Epic and presented the opportunity for them to ask any questions regarding the visit today.  The patient agreed to participate.       Assessment/Plan     Present on Admission:   Depressive disorder   Mixed hyperlipidemia    Assessment:    73 year old female in hospital for suspected overdose of sleeping meds and consulted for intentionality of overdose.  Patient denies any suicidal ideations now or in her lifetime. She says she is not depressed and has poor insight into her medication use for sleep. Patient says she was on ambien for 15 years and recently it was not given to her, so she has been using whatever she could find to sleep.  Denies any depressive or anxious thoughts.      Pateint is confused at times and drowsy, difficult to follow.      Unintentional overdose  Insomnia  Substance dependence (benzo, ambien)      Treatment Plan:    Would verify what patient is taking for sleep- she seems to be obtaining over the counter meds and other medications, mixing them.  She says she has bottles by her bed of what they are- she says she did NOT take benadryl  - verify if ambien was d/c or not and  plan for discharge for her sleep- as it is not safe for her to keep mixing medications and not sleeping      No indication for psychiatric intervention at this time but would obtain collateral regarding her medications and any concerns from , prior to discharge     Discontinue suicide precautions, not suicidal  Patient is confused and drowsy- precautions per medical team and hospital protocol for patient with overdose  Unclear if she is taking benzos- monitor for withdrawal and seizures- she mentioned valium at one point       Planned Medication Changes:    restart home meds per discretion of medical team- paxil and ambien-and follow up with her outpatient doctor    Current Medications:     Current Facility-Administered Medications   Medication Dose Route Frequency Provider Last Rate    albuterol  2.5 mg Nebulization Q6H PRN Sharon Florence MD         Risks / Benefits of Treatment:    Risks, benefits, and possible side effects of medications explained to patient and patient verbalizes understanding.      Other treatment modalities recommended as indicated:    outpatient referral      Inpatient consult to Psychiatry  Consult performed by: Leona Farris MD  Consult ordered by: Sharon Florence MD        Physician Requesting Consult: Agustín Way DO  Principal Problem:Antihistamines overdose    Reason for Consult: Overdose       History of Present Illness      Per chart: 72yo lady with underlying history of depression/insomnia presenting with fall [patient reportedly took 6 tablets of 50 mg Benadryl OTC to assist with her sleep last night].  She was attempting to get out of bed and fell down.  Patient was brought in secondary to fall.     Patient is alert and oriented x3, but confused about the circumstances around this admission. Patient is difficult to follow, jumps around with her story and timing, has slurred words.      Patient says she is in the hospital because she woke up today feeling weak in  her arms and legs with difficulty walking.  She says her  brought her to the ER.  Patient says that she was unable to get her ambien, after 15 years on it, but it is unclear if she stopped being prescribed it or had an issue obtaining it.  She says that she last took ambien 21 days ago, but then also says in september.  She says due to not having ambien, and wanting to sleep, she began to use over the counter meds and other medications from friends.  She says she has a “mixture of meds” that she takes at night to sleep, but it is NOT benadryl. She says she never takes benadryl as it does nothing for her but cannot say what is in the mixture of medications.      Patient says she is sad her sister , but is not depressed or suicidal.  She says she is not suicidal and never had any suicidal thoughts. She says she believes in God and would never harm herself.  She does not believe in psychiatrists and is not depressed.  She says “I do not want to take my own life” and says all of this occurred as she could not get her ambien, which was working for her.  Patient says she takes Paxil, but it is not for depression, possibly confusion.  She says she has been on it for 15 years, when she started ambien at that time.    Denies suicidal ideations/intent or plan, denies auditory or visual hallucinations, denies anxiety.     Psychiatric Review Of Systems:    sleep: with medications  appetite changes: no  weight changes: no  energy/anergy: yes  interest/pleasure/anhedonia: yes  somatic symptoms: no  anxiety/panic: no  milton: no  guilty/hopeless: no  self injurious behavior/risky behavior: no    Historical Information     Past Psychiatric History:     Psychiatric Hospitalizations:   No history of past inpatient psychiatric admissions  Outpatient Treatment History:   denies  Suicide Attempts:   None  History of self-harm:   None  Violence History:   no  Past Psychiatric medication trials: unclear, on paxil    Substance  Abuse History:    denies        Social History:    Lives with   5 children         Past Medical History:   Diagnosis Date    Back pain     Depression     Hyperlipemia     Hypertension     Insomnia        Medical Review Of Systems:    Review of Systems    Meds/Allergies     all current active meds have been reviewed  No Known Allergies    Objective     Vital signs in last 24 hours:  Temp:  [97.3 °F (36.3 °C)-99.2 °F (37.3 °C)] 97.6 °F (36.4 °C)  HR:  [58-66] 58  Resp:  [22] 22  BP: (131-148)/(62-66) 131/66    No intake or output data in the 24 hours ending 03/29/24 1450    Mental Status Evaluation:    Appearance:  age appropriate   Behavior:  Drowsy, confused   Speech:  soft   Mood:  constricted   Affect:  blunted   Language: naming objects   Thought Process:  Confused at times   Associations intact associations   Thought Content:  normal   Perceptual Disturbances: None   Risk Potential: Suicidal Ideations none  Homicidal Ideations none  Potential for Aggression No   Sensorium:  person, place, and time/date   Cognition:  recent memory mildly impaired, remote memory impaired   Consciousness:  awake    Attention: attention span appeared shorter than expected for age   Intellect: not examined   Fund of Knowledge: awareness of current events:     Insight:  limited   Judgment: limited     Lab Results: I have personally reviewed all pertinent laboratory/tests results.     Most Recent Labs:   Lab Results   Component Value Date    WBC 4.34 03/29/2024    RBC 4.82 03/29/2024    HGB 12.3 03/29/2024    HCT 39.5 03/29/2024     03/29/2024    RDW 13.5 03/29/2024    NEUTROABS 2.35 03/29/2024    SODIUM 140 03/29/2024    K 3.6 03/29/2024     03/29/2024    CO2 28 03/29/2024    BUN 9 03/29/2024    CREATININE 0.68 03/29/2024    GLUC 113 03/29/2024    GLUF 112 (H) 07/26/2022    CALCIUM 9.2 03/29/2024    AST 19 03/29/2024    ALT 9 03/29/2024    ALKPHOS 92 03/29/2024    TP 6.7 03/29/2024    ALB 3.7 03/29/2024    TBILI  0.93 03/29/2024    CHOLESTEROL 195 07/26/2022    HDL 47 (L) 07/26/2022    TRIG 129 07/26/2022    LDLCALC 122 (H) 07/26/2022    NONHDLC 174 07/06/2021    HIA5ZOWOXRRU 2.948 07/26/2022    HGBA1C 6.1 (H) 11/15/2022     11/15/2022       Imaging Studies: CT spine lumbar without contrast    Result Date: 3/29/2024  Narrative: CT LUMBAR SPINE INDICATION:   LBP s/p fall. COMPARISON: None. TECHNIQUE:  Contiguous axial images through the lumbar spine were obtained. Sagittal and coronal reconstructions were performed. IV Contrast: None Radiation dose length product (DLP) for this visit:  518 mGy-cm .  This examination, like all CT scans performed in the [facility], was performed utilizing techniques to minimize radiation dose exposure, including the use of iterative reconstruction and automated exposure control. IMAGE QUALITY:  Diagnostic. FINDINGS: ALIGNMENT:  There are 5 lumbar type vertebral bodies. Grade 1 L5 on S1 anterolisthesis secondary to chronic bilateral pars defects at this level. Mild dextroscoliosis with apex at L3-4. VERTEBRAE:  No fracture.  No lytic or blastic lesion. DEGENERATIVE CHANGES: Lower Thoracic spine:  Normal lower thoracic disc spaces. L1-2: Minor bulge and facet arthrosis without stenosis. L2-3: Diffuse disc bulge with bilateral facet arthrosis. Mild central canal encroachment without significant foraminal stenosis. L3-4: Disc bulge with superimposed left foraminal disc osteophyte complex resulting in moderate to severe left foraminal stenosis. Bilateral facet arthrosis. Mild right foraminal narrowing. Mild central canal stenosis. L4-5: Loss of disc height with vacuum phenomenon. Disc osteophyte complex and bilateral facet arthrosis resulting in mild to moderate right with moderate left foraminal stenosis. Central canal remains patent. L5-S1: Anterolisthesis secondary to chronic pars defects as above. Diffuse disc bulge with prominent unroofing of the disc. The central canal  remains patent at this level. There is severe right and moderate to severe left foraminal stenosis. Correlate clinically for right greater than left L5 radiculopathy. PARASPINAL SOFT TISSUES:   Normal.     Impression: 1. No evidence of acute posttraumatic injury. No vertebral compression fracture. 2. Chronic L5 pars defects with grade 1 L5 on S1 anterolisthesis noted. 3. 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 Workstation performed: [id number]     CT head without contrast    Result Date: 3/29/2024  Narrative: CT BRAIN - WITHOUT CONTRAST INDICATION:   fall out of bed, possible head injury, patient accidentally OD on OTC sleep aid. COMPARISON: CT head August 2, 2020. Correlation with MR brain June 23, 2023 TECHNIQUE:  CT examination of the brain was performed.  Multiplanar 2D reformatted images were created from the source data. Radiation dose length product (DLP) for this visit:  873 mGy-cm .  This examination, like all CT scans performed in the [facility], was performed utilizing techniques to minimize radiation dose exposure, including the use of iterative reconstruction and automated exposure control. IMAGE QUALITY:  Diagnostic. FINDINGS: PARENCHYMA: Decreased attenuation is noted in periventricular and subcortical white matter demonstrating an appearance that is statistically most likely to represent moderate microangiopathic change. No CT signs of acute infarction.  No intracranial mass, mass effect or midline shift.  No acute parenchymal hemorrhage.  No intracranial mass, mass effect or midline shift. No CT signs of acute infarction.  No acute parenchymal hemorrhage. VENTRICLES AND EXTRA-AXIAL SPACES:  Normal for the patient's age. VISUALIZED ORBITS: Normal visualized orbits. PARANASAL SINUSES: Normal visualized paranasal sinuses. CALVARIUM AND  EXTRACRANIAL SOFT TISSUES:  Normal.     Impression: No acute intracranial abnormality.  Chronic microangiopathic changes. Workstation performed: [id number]     EKG/Pathology/Other Studies:   Lab Results   Component Value Date    VENTRATE 61 2024    ATRIALRATE 61 2024    PRINT 170 2024    QRSDINT 78 2024    QTINT 444 2024    QTCINT 446 2024    PAXIS 57 2024    QRSAXIS 26 2024    TWAVEAXIS 171 2024        Code Status: Level 1 - Full Code  Advance Directive and Living Will:      Power of :    POLST:      Screenings:    1. Nutrition Screening  Nutrition Assessment (completed by Staff):      2. Pain Screening  Pain Screening:      3. Suicide Screening  ED Crisis Suicide Risk Assessment:        C-SSRS - Screen?1. In the last month have you wished you were dead or wished you could go to sleep and not wake up? No  ?2. In the last month, have you actually had thoughts about killing yourself? No??  3. Have you done anything, started to do anything, or prepared to do anything to end your life in the last 3 months? No??  Suicide Risk : Low\      SAFE-T Suicide Risk Assessment   If Suicide Screen is positive, please continue with risk suicide assessment.  Risk Factors: Pertinent positives  Hx of depression on medications, sister recently , ambien dependence with insomnia     Access to lethal means: Denied having access to guns or knives_     Protective Factors:  _  , 5 children, belief in God and against suicide, no hx of suicide     Suicide Inquiry: see suicide screen      Suicide Risk Level: Low_     Interventions: Please follow hospital policies congruent with suicide risk level indicated including observation status, please see below for disposition, follow-up, and medication recommendations              Counseling / Coordination of Care:    Total floor / unit time spent today 50 minutes. Greater than 50% of total time was spent with the patient and /  or family counseling and / or coordination of care. A description of the counseling / coordination of care: chart review, discussion with team member, clinical eval

## 2024-03-29 NOTE — ASSESSMENT & PLAN NOTE
72yo lady with underlying history of depression/insomnia presenting with fall [patient reportedly took 6 tablets of 50 mg Benadryl OTC to assist with her sleep last night].  She was attempting to get out of bed and fell down.  Patient was brought in secondary to fall.    CT head negative for acute findings  CT L-spine notes Chronic L5 pars defects with grade 1 L5 on S1 anterolisthesis noted. 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  Adding CBC/CMP/ethanol/salicylate/Tylenol level within normal limits  Appreciate toxicology input.  For now continue with supportive care. Watch for any anticholinergic symptoms; hypertension, tachycardia, elevated temperature.  Refer to toxicology note for details.  Patient being placed on one-to-one observation for now  Suicidal precautions  Psychiatry consulted.  On telemetry.

## 2024-03-29 NOTE — CONSULTS
INTERPROFESSIONAL (PHONE) CONSULTATION - Medical Toxicology  Marie Pope 73 y.o. female MRN: [id number]  Unit/Bed#: ED 12 Encounter: [id number]      Reason for Consult / Principal Problem: overdose  Inpatient consult to Toxicology  Consult performed by: Amairani Crespo MD  Consult ordered by: Ghada Dailey,         03/29/24      ASSESSMENT:  Diphenhydramine overdose -antihistamine effect  Sedation  Hypoxia    RECOMMENDATIONS:    I suspect the patient's hypoxia may be due to either aspiration or DEEPIKA when the patient falls asleep.  Would continue to monitor/workup.    Otherwise, the patient's level of sedation would be consistent with that of a diphenhydramine overdose.  There is no antidote for this sedation aside from supportive care and tincture of time.    Continue to monitor for the development of anticholinergic toxidrome: Hypertension, tachycardia, elevated temperature, dilated pupils, dry mucous membranes, delayed motility, flushed skin.  Give benzodiazepines i.e. 5 to 10 mg IV diazepam for SBP greater than 180, heart rate greater than 150, temperature greater than 103 °F not responsive to active cooling, anxiety, agitation, seizures.    Avoid further anticholinergic agents at this time including antipsychotics.  Patient develops evidence of anticholinergic toxidrome/tachycardia, please obtain EKG to monitor for development of QRS widening due to sodium channel blockade.     Q1 EKGs while patient is tachycardic  - if QRS > 120, give 1-2 mEq per kg sodium bicarbonate  - goal: QRS < 120, titrate to pH 7.55, K > 4.0  - if QRS widening is refractory to sodium bicarbonate, please administer IV lidocaine or hypertonic saline    Provided there are no additional medical concerns, the patient may be cleared from a toxicological standpoint by the primary provider at the end of the observation period, if the patient is asymptomatic, with normal mental status and vital signs, and otherwise normal exam.  Please call medical  on call immediately to discuss any changes in clinical course or laboratory abnormalities.        For further questions, please contact the medical  on call via [place] [facility] between 8am and 9pm. If between 9pm and 8am, please reach out to the [facility] at [phone].     Please see additional teaching note below:    Medical Toxicology  [facility]  Anticholinergic Syndrome  Updated 03/04/2008    Background: Anticholinergic intoxication can occur from exposure to a wide variety of prescription and over-the-counter medications and numerous plants and mushrooms. Common drugs that have anticholinergic activities include: antihistamines, antipsychotics, antispasmodics, skeletal muscle relaxants, and tricyclic antidepressants (TCAs). Plants and mushrooms containing anticholinergic alkaloids include: jimsonweed (Datura stramonium), deadly nightshade (Atropa belladonna), and fly agaric (Amanita muscaria).  Mechanism of Toxicity:  Competitively antagonize the effect of acetylcholine at peripheral muscarinic receptors. They mostly affect exocrine glands (such as sweating and salivation) and smooth muscle. Inhibition of muscarinic activities in the heart leads to a rapid heartbeat.  Pharmacokinetics: Absorption may be delayed due to its effect on GI motility. Duration of toxic effect can be quite prolonged (e.g. benztropine  2-3 days)  Toxic dose: The range of toxicity is highly variable and unpredictable.  Examples of Fatal doses from case reports:  Young Child: 1-2 mg Atropine, instilled in the eye  Adult: 32 mg Atropine, IM injection  Minimal effect: increased heart rate and dry mouth after 360 mg of trospium chloride  Clinical presentation: Anticholinergic syndrome is characterized by warm, dry, flushed skin, dry mucous membranes, mydriasis, delirium, tachycardia, ileus, and urinary retention. Jerky myoclonic movements and choreoathetosis are  common and can lead to rhabdomyolysis. Hyperthermia, coma, respiratory arrest and seizures may occur.  Diagnosis: Based on history of exposure and typical features of anticholinergic syndrome. Trial dose of physostigmine can be used to confirm the diagnosis, especially with rapid reversal of the syndrome.  Laboratory studies: Not generally available for the anticholinergics, but other labs can be useful such as electrolytes, glucose, CPK, and ECG.  Treatment:   ABC  Seizure and muscular hyperactivity: Should be treated with benzodiazepines. Treat aggressively to prevent hyperthermia and rhabdomyolysis and their resultant complications. If unsuccessful use phenobarbital or propofol.  Delirium: Treat with benzodiazepines, if resistant to benzodiazepines consider treating with physostigmine.  GI decontamination maybe helpful in delayed presentation secondary to decreased GI motility provided that the patient is awake and alert.  Enhanced elimination: Procedures such as hemodialysis and repeated-dose activated charcoal are not effective in removing anticholinergic agents.      Physostigmine:   Pharmacology: Physostigmine is a carbamate and a reversible inhibitor of acetylcholinesterase. It increases acetylcholine concentration, causing stimulation of both muscarinic and nicotinic receptors. It also can penetrate the blood-brain barrier. It has nonspecific arousal effects.  Onset of action: 3-8 minutes after parenteral administration.  Duration of action: 30-90 minutes.  Elimination half-life: 15-40 minutes.  Indications:  Severe anticholinergic syndrome from antimuscarinic agents. Generally used to reverse delirium resistant to benzodiazepines.  Diagnostically: To differentiate functional psychosis from anticholinergic delirium.  Contraindications:  Should not be used as an antidote for cyclic antidepressant overdose because it may worsen cardiac conduction disturbance, cause bradyarrythmias or asystole, and aggravate  or precipitate seizures.  Do not use physostigmine with concurrent use of depolarizing neuromuscular blockers (e.g. succinylcholine).  Known hypersensitivity to agent or preservative.  Relative contraindication may include: Asthma, peripheral vascular disease, intestinal and bladder blockade, parkinsonian syndrome, and AV Block.  Adverse effects:  Bradycardia, heart block, and asystole.  Seizure (particularly with rapid administration or excessive dose).  Nausea, vomiting, hypersalivation, and diarrhea.  Bronchorrhea and bronchospasm.  Fasciculation and muscle weakness.  Dosage:  Adult: 0.5-2 mg slow IV push (£ 1 mg/min).  Children: 0.02 mg/kg slow IV push (£ 0.5 mg/min).  Repeat as needed every 10-30 minutes.  Precautions: Patient should be on a cardiac monitor. Atropine should be kept at bedside to treat excessive muscarinic stimulation.  Should not be administered IM or as a continuous infusion.  It is better to undertreat than to overtreat. Physostigmine toxicity results when used in excess or in the absence of antimuscarinic toxicity.    References:  [place] ELIZABETH. Poisoning & Drug Overdose. 2007.  [place] [facility]. [place] Toxicologic Emergencies. 2006.      Hx and PE limited by the dynamics of a phone consultation. I have not personally interviewed or evaluated the patient, but only advised based on the information provided to me. Primary provider is responsible for all clinical decisions.     Pertinent history, physical exam and clinical findings and course discussed: Marie Pope is a 73 y.o. year old female who presents with sedation and suspected diphenhydramine overdose.  Past medical history significant for depression and insomnia.  Reportedly, patient took proximately 300 mg p.o. of diphenhydramine to help her sleep.  Patient fell out of bed this morning and was noted to be altered/somnolent by her family, prompting visit to the emergency department.  On arrival to the ED, patient is notably  sleepy, hypoxic 89%, with no specific toxidrome on physical exam.    Review of systems and physical exam not performed by me.    Historical Information   Past Medical History:   Diagnosis Date    Back pain     Depression     Hyperlipemia     Hypertension     Insomnia      Past Surgical History:   Procedure Laterality Date    CHOLECYSTECTOMY      TUBAL LIGATION       Social History   Social History     Substance and Sexual Activity   Alcohol Use No     Social History     Substance and Sexual Activity   Drug Use No     Social History     Tobacco Use   Smoking Status Never   Smokeless Tobacco Former     Family History   Problem Relation Age of Onset    Cancer Mother     Cancer Father         Prior to Admission medications    Medication Sig Start Date End Date Taking? Authorizing Provider   albuterol (2.5 mg/3 mL) 0.083 % nebulizer solution Take 3 mL (2.5 mg total) by nebulization every 6 (six) hours as needed for wheezing or shortness of breath 6/1/23   Rickie Bauer DO   albuterol (ProAir HFA) 90 mcg/act inhaler Inhale 2 puffs every 6 (six) hours as needed for wheezing 6/1/23   Rickie Bauer DO   atorvastatin (LIPITOR) 40 mg tablet Take 1 tablet (40 mg total) by mouth daily at bedtime 1/26/24   Christianne Elizalde MD   LORazepam (Ativan) 0.5 mg tablet Take one tablet before procedure. 6/14/23   Lucia Posada DO   PARoxetine (PAXIL) 20 mg tablet TAKE 1 TABLET BY MOUTH EVERY DAY 3/19/24   Lucia Posada DO   zolpidem (AMBIEN) 10 mg tablet TAKE 1 TABLET BY MOUTH AT BEDTIME AS NEEDED FOR SLEEP 3/28/24   PETER Ocasio       No current facility-administered medications for this encounter.       No Known Allergies    Objective     No intake or output data in the 24 hours ending 03/29/24 1239    Invasive Devices:   Peripheral IV 05/31/23 Left Antecubital (Active)       Vitals   Vitals:    03/29/24 1039 03/29/24 1100   BP: 148/62    TempSrc: Temporal Oral   Pulse: 66    Resp: 22   "  Patient Position - Orthostatic VS: Sitting    Temp: 99.2 °F (37.3 °C) (!) 97.3 °F (36.3 °C)         EKG, Pathology, and/or Other Studies: I have personally reviewed pertinent reports.    Normal sinus rhythm 61, QRS 78, QTc 446, no ST elevation or depression, no ectopy, no terminal R.  Overall impression: No toxicologic findings    Lab Results: I have personally reviewed pertinent reports.      Labs:    Results from last 7 days   Lab Units 03/29/24  1103   WBC Thousand/uL 4.34   HEMOGLOBIN g/dL 12.3   HEMATOCRIT % 39.5   PLATELETS Thousands/uL 225   NEUTROS PCT % 55   LYMPHS PCT % 28   MONOS PCT % 10   EOS PCT % 6      Results from last 7 days   Lab Units 03/29/24  1103   SODIUM mmol/L 140   POTASSIUM mmol/L 3.6   CHLORIDE mmol/L 103   CO2 mmol/L 28   BUN mg/dL 9   CREATININE mg/dL 0.68   CALCIUM mg/dL 9.2   ALK PHOS U/L 92   ALT U/L 9   AST U/L 19   MAGNESIUM mg/dL 2.1              0   Lab Value Date/Time    TROPONINI <0.02 08/02/2020 2216         Results from last 7 days   Lab Units 03/29/24  1103   ACETAMINOPHEN LVL ug/mL <2*   ETHANOL LVL mg/dL <10   SALICYLATE LVL mg/dL <5     Invalid input(s): \"EXTPREGUR\"      Imaging Studies: I have personally reviewed pertinent reports.      Counseling / Coordination of Care  Total time spent today 25 minutes. This was a phone consultation.       "

## 2024-03-29 NOTE — PLAN OF CARE
Problem: NEUROSENSORY - ADULT  Goal: Achieves stable or improved neurological status  Description: INTERVENTIONS  - Monitor and report changes in neurological status  - Monitor vital signs such as temperature, blood pressure, glucose, and any other labs ordered   - Initiate measures to prevent increased intracranial pressure  - Monitor for seizure activity and implement precautions if appropriate      Outcome: Progressing  Goal: Remains free of injury related to seizures activity  Description: INTERVENTIONS  - Maintain airway, patient safety  and administer oxygen as ordered  - Monitor patient for seizure activity, document and report duration and description of seizure to physician/advanced practitioner  - If seizure occurs,  ensure patient safety during seizure  - Reorient patient post seizure  - Seizure pads on all 4 side rails  - Instruct patient/family to notify RN of any seizure activity including if an aura is experienced  - Instruct patient/family to call for assistance with activity based on nursing assessment  - Administer anti-seizure medications if ordered    Outcome: Progressing  Goal: Achieves maximal functionality and self care  Description: INTERVENTIONS  - Monitor swallowing and airway patency with patient fatigue and changes in neurological status  - Encourage and assist patient to increase activity and self care.   - Encourage visually impaired, hearing impaired and aphasic patients to use assistive/communication devices  Outcome: Progressing     Problem: CARDIOVASCULAR - ADULT  Goal: Maintains optimal cardiac output and hemodynamic stability  Description: INTERVENTIONS:  - Monitor I/O, vital signs and rhythm  - Monitor for S/S and trends of decreased cardiac output  - Administer and titrate ordered vasoactive medications to optimize hemodynamic stability  - Assess quality of pulses, skin color and temperature  - Assess for signs of decreased coronary artery perfusion  - Instruct patient to  report change in severity of symptoms  Outcome: Progressing  Goal: Absence of cardiac dysrhythmias or at baseline rhythm  Description: INTERVENTIONS:  - Continuous cardiac monitoring, vital signs, obtain 12 lead EKG if ordered  - Administer antiarrhythmic and heart rate control medications as ordered  - Monitor electrolytes and administer replacement therapy as ordered  Outcome: Progressing     Problem: METABOLIC, FLUID AND ELECTROLYTES - ADULT  Goal: Electrolytes maintained within normal limits  Description: INTERVENTIONS:  - Monitor labs and assess patient for signs and symptoms of electrolyte imbalances  - Administer electrolyte replacement as ordered  - Monitor response to electrolyte replacements, including repeat lab results as appropriate  - Instruct patient on fluid and nutrition as appropriate  Outcome: Progressing  Goal: Fluid balance maintained  Description: INTERVENTIONS:  - Monitor labs   - Monitor I/O and WT  - Instruct patient on fluid and nutrition as appropriate  - Assess for signs & symptoms of volume excess or deficit  Outcome: Progressing  Goal: Glucose maintained within target range  Description: INTERVENTIONS:  - Monitor Blood Glucose as ordered  - Assess for signs and symptoms of hyperglycemia and hypoglycemia  - Administer ordered medications to maintain glucose within target range  - Assess nutritional intake and initiate nutrition service referral as needed  Outcome: Progressing

## 2024-03-29 NOTE — ED PROVIDER NOTES
"History  Chief Complaint   Patient presents with    Overdose - Accidental     Pt states she could fall asleep for days, so she took \"[employer] aid sleeping peels, I took 20 pills in 3 days and it did nothing\" denies SI/HI     Patient 73-year-old female with past medical history of hypertension, hyperlipidemia, depression, insomnia, chronic low back pain, presents to the ED for fall out of bed as well as accidental overdose on over-the-counter sleep aid medication.  Patient states that ever since her sister passed away in October 2023, she has had depression and insomnia.  Her doctor was prescribing her Ambien and she tried to get a refill for the Ambien at the pharmacy yesterday but they did not have it in stock so she bought over-the-counter sleep aid which  confirms is Benadryl 50 mg tablets.  She states she has not slept in several days and last night took 6 tablets.  She states she did not take them all at once but after taking 2 tablets, she was not sleeping so she took additional tablets.  Total dose was approximately 300 mg.  This morning, patient tried to get up but fell out of bed.  She reports feeling tired and dizzy.  She denies any head strike or loss of consciousness.  She does report low back pain but states this is chronic and denies any new injury from the fall.  Denies any chest pain, dyspnea, palpitations, vertigo.  She reports the dizziness is more of a lightheaded feeling.  She denies any ringing in her ears, loss of hearing, change in vision, abdominal pain, nausea, vomiting, change in bowel habits, excessive sweating, dry mouth, lateralizing weakness or extremity paresthesia or other focal neurologic deficits.  She states she feels generally weak.  Patient denies that this was a suicide attempt or other attempt to harm herself.  Denies being on any blood thinners.      History provided by:  Patient   used: No    Overdose - Accidental  Associated symptoms: no abdominal " pain, no chest pain, no cough, no diarrhea, no headaches, no nausea, no shortness of breath and no vomiting        Prior to Admission Medications   Prescriptions Last Dose Informant Patient Reported? Taking?   LORazepam (Ativan) 0.5 mg tablet   No No   Sig: Take one tablet before procedure.   PARoxetine (PAXIL) 20 mg tablet   No No   Sig: TAKE 1 TABLET BY MOUTH EVERY DAY   albuterol (2.5 mg/3 mL) 0.083 % nebulizer solution   No No   Sig: Take 3 mL (2.5 mg total) by nebulization every 6 (six) hours as needed for wheezing or shortness of breath   albuterol (ProAir HFA) 90 mcg/act inhaler   No No   Sig: Inhale 2 puffs every 6 (six) hours as needed for wheezing   atorvastatin (LIPITOR) 40 mg tablet   No No   Sig: Take 1 tablet (40 mg total) by mouth daily at bedtime   zolpidem (AMBIEN) 10 mg tablet   No No   Sig: TAKE 1 TABLET BY MOUTH AT BEDTIME AS NEEDED FOR SLEEP      Facility-Administered Medications: None       Past Medical History:   Diagnosis Date    Back pain     Depression     Hyperlipemia     Hypertension     Insomnia        Past Surgical History:   Procedure Laterality Date    CHOLECYSTECTOMY      TUBAL LIGATION         Family History   Problem Relation Age of Onset    Cancer Mother     Cancer Father      I have reviewed and agree with the history as documented.    E-Cigarette/Vaping    E-Cigarette Use Never User      E-Cigarette/Vaping Substances    Nicotine No      Social History     Tobacco Use    Smoking status: Never    Smokeless tobacco: Former   Vaping Use    Vaping status: Never Used   Substance Use Topics    Alcohol use: No    Drug use: No       Review of Systems   Constitutional:  Negative for chills and fever.   HENT:  Negative for congestion, ear pain, hearing loss, rhinorrhea, sore throat and tinnitus.    Eyes:  Negative for photophobia, pain and visual disturbance.   Respiratory:  Negative for cough, chest tightness, shortness of breath and wheezing.    Cardiovascular:  Negative for chest pain  and palpitations.   Gastrointestinal:  Negative for abdominal pain, constipation, diarrhea, nausea and vomiting.   Genitourinary:  Negative for dysuria, flank pain, frequency and hematuria.   Musculoskeletal:  Positive for back pain. Negative for neck pain.   Skin:  Negative for color change, pallor, rash and wound.   Allergic/Immunologic: Negative for immunocompromised state.   Neurological:  Positive for light-headedness. Negative for dizziness, syncope, weakness, numbness and headaches.   Hematological:  Negative for adenopathy. Does not bruise/bleed easily.   Psychiatric/Behavioral:  Positive for sleep disturbance. Negative for confusion, decreased concentration, hallucinations, self-injury and suicidal ideas.    All other systems reviewed and are negative.      Physical Exam  Physical Exam  Vitals and nursing note reviewed.   Constitutional:       General: She is not in acute distress.     Appearance: Normal appearance. She is well-developed. She is not ill-appearing, toxic-appearing or diaphoretic.   HENT:      Head: Normocephalic and atraumatic.      Right Ear: External ear normal.      Left Ear: External ear normal.      Nose: Nose normal.      Mouth/Throat:      Mouth: Mucous membranes are moist.      Pharynx: Oropharynx is clear. No oropharyngeal exudate.   Eyes:      Extraocular Movements: Extraocular movements intact.      Conjunctiva/sclera: Conjunctivae normal.      Pupils: Pupils are equal, round, and reactive to light.   Neck:      Vascular: No JVD.   Cardiovascular:      Rate and Rhythm: Normal rate and regular rhythm.      Pulses: Normal pulses.      Heart sounds: Normal heart sounds. No murmur heard.     No friction rub. No gallop.   Pulmonary:      Effort: Pulmonary effort is normal. No respiratory distress.      Breath sounds: Normal breath sounds. No wheezing, rhonchi or rales.   Abdominal:      General: There is no distension.      Palpations: Abdomen is soft.      Tenderness: There is no  abdominal tenderness. There is no guarding or rebound.   Musculoskeletal:         General: No swelling or tenderness. Normal range of motion.      Cervical back: Normal range of motion and neck supple. No rigidity.   Skin:     General: Skin is warm and dry.      Coloration: Skin is not pale.      Findings: No erythema or rash.   Neurological:      General: No focal deficit present.      Mental Status: She is oriented to person, place, and time.      Cranial Nerves: No cranial nerve deficit.      Sensory: No sensory deficit.      Motor: No weakness.      Comments: GCS 14 (-1 for eye opening).  Patient sleepy but easily arousable and able to hold a conversation.  4/5 strength throughout.  No focal motor or sensory deficits.  Patient is oriented x 3 but did take longer than typical to identify the correct year and month.   Psychiatric:         Behavior: Behavior normal.         Vital Signs  ED Triage Vitals [03/29/24 1039]   Temperature Pulse Respirations Blood Pressure SpO2   99.2 °F (37.3 °C) 66 22 148/62 96 %      Temp Source Heart Rate Source Patient Position - Orthostatic VS BP Location FiO2 (%)   Temporal Monitor Sitting Left arm --      Pain Score       --         Vitals:    03/29/24 1039 03/29/24 1100 03/29/24 1330   BP: 148/62  131/66   BP Location: Left arm  Right arm   Pulse: 66  58   Resp: 22  22   Temp: 99.2 °F (37.3 °C) (!) 97.3 °F (36.3 °C) 97.6 °F (36.4 °C)   TempSrc: Temporal Oral Oral   SpO2: 96%  96%          Visual Acuity      ED Medications  Medications   albuterol inhalation solution 2.5 mg (has no administration in time range)   sodium chloride 0.9 % bolus 1,000 mL (1,000 mL Intravenous New Bag 3/29/24 1108)       Diagnostic Studies  Results Reviewed       Procedure Component Value Units Date/Time    Rapid drug screen, urine [[id number]]  (Normal) Collected: 03/29/24 1328    Lab Status: Final result Specimen: Urine, Clean Catch Updated: 03/29/24 1422     Amph/Meth UR Negative     Barbiturate Ur  Negative     Benzodiazepine Urine Negative     Cocaine Urine Negative     Methadone Urine Negative     Opiate Urine Negative     PCP Ur Negative     THC Urine Negative     Oxycodone Urine Negative    Narrative:      FOR MEDICAL PURPOSES ONLY.   IF CONFIRMATION NEEDED PLEASE CONTACT THE LAB WITHIN 5 DAYS.    Drug Screen Cutoff Levels:  AMPHETAMINE/METHAMPHETAMINES  1000 ng/mL  BARBITURATES     200 ng/mL  BENZODIAZEPINES     200 ng/mL  COCAINE      300 ng/mL  METHADONE      300 ng/mL  OPIATES      300 ng/mL  PHENCYCLIDINE     25 ng/mL  THC       50 ng/mL  OXYCODONE      100 ng/mL    Urine Microscopic [[id number]]  (Abnormal) Collected: 03/29/24 1328    Lab Status: Final result Specimen: Urine, Clean Catch Updated: 03/29/24 1348     RBC, UA 1-2 /hpf      WBC, UA 4-10 /hpf      Epithelial Cells None Seen /hpf      Bacteria, UA None Seen /hpf      MUCUS THREADS Occasional    UA (URINE) with reflex to Scope [[id number]]  (Abnormal) Collected: 03/29/24 1328    Lab Status: Final result Specimen: Urine, Clean Catch Updated: 03/29/24 1345     Color, UA Light Yellow     Clarity, UA Clear     Specific Gravity, UA 1.009     pH, UA 6.0     Leukocytes, UA Moderate     Nitrite, UA Negative     Protein, UA Negative mg/dl      Glucose, UA Negative mg/dl      Ketones, UA Negative mg/dl      Urobilinogen, UA <2.0 mg/dl      Bilirubin, UA Negative     Occult Blood, UA Negative    CBC and differential [[id number]]  (Abnormal) Collected: 03/29/24 1103    Lab Status: Final result Specimen: Blood from Arm, Left Updated: 03/29/24 1224     WBC 4.34 Thousand/uL      RBC 4.82 Million/uL      Hemoglobin 12.3 g/dL      Hematocrit 39.5 %      MCV 82 fL      MCH 25.5 pg      MCHC 31.1 g/dL      RDW 13.5 %      MPV 11.5 fL      Platelets 225 Thousands/uL      nRBC 0 /100 WBCs      Neutrophils Relative 55 %      Immature Grans % 0 %      Lymphocytes Relative 28 %      Monocytes Relative 10 %      Eosinophils Relative 6 %      Basophils Relative 1 %       Neutrophils Absolute 2.35 Thousands/µL      Absolute Immature Grans 0.01 Thousand/uL      Absolute Lymphocytes 1.23 Thousands/µL      Absolute Monocytes 0.43 Thousand/µL      Eosinophils Absolute 0.27 Thousand/µL      Basophils Absolute 0.05 Thousands/µL     Basic metabolic panel [[id number]] Collected: 03/29/24 1103    Lab Status: Final result Specimen: Blood from Arm, Left Updated: 03/29/24 1204     Sodium 140 mmol/L      Potassium 3.6 mmol/L      Chloride 103 mmol/L      CO2 28 mmol/L      ANION GAP 9 mmol/L      BUN 9 mg/dL      Creatinine 0.68 mg/dL      Glucose 113 mg/dL      Calcium 9.2 mg/dL      eGFR 86 ml/min/1.73sq m     Narrative:      [employer] guidelines for Chronic Kidney Disease (CKD):     Stage 1 with normal or high GFR (GFR > 90 mL/min/1.73 square meters)    Stage 2 Mild CKD (GFR = 60-89 mL/min/1.73 square meters)    Stage 3A Moderate CKD (GFR = 45-59 mL/min/1.73 square meters)    Stage 3B Moderate CKD (GFR = 30-44 mL/min/1.73 square meters)    Stage 4 Severe CKD (GFR = 15-29 mL/min/1.73 square meters)    Stage 5 End Stage CKD (GFR <15 mL/min/1.73 square meters)  Note: GFR calculation is accurate only with a steady state creatinine    Hepatic function panel [[id number]]  (Normal) Collected: 03/29/24 1103    Lab Status: Final result Specimen: Blood from Arm, Left Updated: 03/29/24 1204     Total Bilirubin 0.93 mg/dL      Bilirubin, Direct 0.16 mg/dL      Alkaline Phosphatase 92 U/L      AST 19 U/L      ALT 9 U/L      Total Protein 6.7 g/dL      Albumin 3.7 g/dL     Magnesium [[id number]]  (Normal) Collected: 03/29/24 1103    Lab Status: Final result Specimen: Blood from Arm, Left Updated: 03/29/24 1204     Magnesium 2.1 mg/dL     Salicylate level [[id number]]  (Normal) Collected: 03/29/24 1103    Lab Status: Final result Specimen: Blood from Arm, Left Updated: 03/29/24 1204     Salicylate Lvl <5 mg/dL     Acetaminophen level-If concentration is detectable, please discuss with  medical  on call. [[id number]]  (Abnormal) Collected: 03/29/24 1103    Lab Status: Final result Specimen: Blood from Arm, Left Updated: 03/29/24 1204     Acetaminophen Level <2 ug/mL     HS Troponin 0hr (reflex protocol) [[id number]]  (Normal) Collected: 03/29/24 1103    Lab Status: Final result Specimen: Blood from Arm, Left Updated: 03/29/24 1134     hs TnI 0hr <2 ng/L     Ethanol [[id number]]  (Normal) Collected: 03/29/24 1103    Lab Status: Final result Specimen: Blood from Arm, Left Updated: 03/29/24 1126     Ethanol Lvl <10 mg/dL                    CT head without contrast   Final Result by Guille Manzano MD (03/29 1214)      No acute intracranial abnormality.  Chronic microangiopathic changes.                  Workstation performed: [id number]         CT spine lumbar without contrast   Final Result by Kin Arteaga MD (03/29 1239)   1. No evidence of acute posttraumatic injury. No vertebral compression fracture.   2. Chronic L5 pars defects with grade 1 L5 on S1 anterolisthesis noted.   3. 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         Workstation performed: [id number]         XR chest 1 view portable   ED Interpretation by Ghada Dailey DO (03/29 1313)   No acute abnormality in the chest.                 Procedures  ECG 12 Lead Documentation Only    Date/Time: 3/29/2024 10:54 AM    Performed by: Ghada Dailey DO  Authorized by: Ghada Dailey DO    ECG reviewed by me, the ED Provider: yes    Patient location:  ED  Previous ECG:     Previous ECG:  Compared to current    Comparison ECG info:  5-    Comparison to previous ECG: T wave inversion is now present in inferior and lateral leads.  Rate:     ECG rate:  61    ECG rate assessment: normal    Rhythm:     Rhythm: sinus rhythm    Ectopy:     Ectopy: none     QRS:     QRS axis:  Normal    QRS intervals:  Normal  Conduction:     Conduction: normal    ST segments:     ST segments:  Non-specific  T waves:     T waves: non-specific and inverted      Inverted:  V2, V3, V4, aVF and I  Comments:      Low voltage QRS complex.           ED Course  ED Course as of 03/29/24 1458   Fri Mar 29, 2024   1214 Toxicology, Dr. Crespo, agreed with observation admission especially if patient is dizzy and requiring oxygen.  She stated that she should be observed until she is AO x 4 and ambulatory and back to her baseline.               Identification of Seniors at Risk      Flowsheet Row Most Recent Value   (ISAR) Identification of Seniors at Risk    Before the illness or injury that brought you to the Emergency, did you need someone to help you on a regular basis? 0 Filed at: 03/29/2024 1042   In the last 24 hours, have you needed more help than usual? 0 Filed at: 03/29/2024 1042   Have you been hospitalized for one or more nights during the past 6 months? 0 Filed at: 03/29/2024 1042   In general, do you see well? 0 Filed at: 03/29/2024 1042   In general, do you have serious problems with your memory? 0 Filed at: 03/29/2024 1042   Do you take more than three different medications every day? 0 Filed at: 03/29/2024 1042   ISAR Score 0 Filed at: 03/29/2024 1042                                      Medical Decision Making  73-year-old female presents to the ED for accidental Benadryl overdose.  Patient has insomnia and took a total of 300 mg of Benadryl last night to aid with sleep.  She fell out of bed today.  In regards to the fall, no major complaints but will obtain head CT to rule out ICH.  She also complains of chronic low back pain so will evaluate with CT lumbar spine.  Will check cardiac labs, panel, UDS and will consult with collagen for further recommendations.  Patient noted to be 89% on room air so placed on 2 L nasal cannula with improvement into the mid 90s.    Amount and/or  Complexity of Data Reviewed  Independent Historian: spouse  Labs: ordered. Decision-making details documented in ED Course.  Radiology: ordered and independent interpretation performed.  ECG/medicine tests: ordered and independent interpretation performed. Decision-making details documented in ED Course.    Risk  Decision regarding hospitalization.             Disposition  Final diagnoses:   Antihistamines overdose, accidental or unintentional, initial encounter   Hypoxia   Accidental fall from bed, initial encounter     Time reflects when diagnosis was documented in both MDM as applicable and the Disposition within this note       Time User Action Codes Description Comment    3/29/2024 12:13 PM Ghada Dailey Add [T45.0X1A] Antihistamines overdose, accidental or unintentional, initial encounter     3/29/2024 12:14 PM Ghada Dailey Add [R09.02] Hypoxia     3/29/2024 12:14 PM Ghada Dailey Add [W06.XXXA] Accidental fall from bed, initial encounter           ED Disposition       ED Disposition   Admit    Condition   Stable    Date/Time   Fri Mar 29, 2024 1327    Comment   Case was discussed with MATEO and the patient's admission status was agreed to be Admission Status: observation status to the service of Dr. Way .               Follow-up Information    None         Patient's Medications   Discharge Prescriptions    No medications on file       No discharge procedures on file.    PDMP Review         Value Time User    PDMP Reviewed  Yes 3/28/2024 11:25 AM PETER Ocasio            ED Provider  Electronically Signed by             Ghada Dailey DO  03/29/24 0193

## 2024-03-29 NOTE — H&P
[facility]  H&P  Name: Marie Pope 73 y.o. female I MRN: [id number]  Unit/Bed#: ED 12 I Date of Admission: 3/29/2024   Date of Service: 3/29/2024 I Hospital Day: 0      Assessment/Plan   * Antihistamines overdose  Assessment & Plan  74yo lady with underlying history of depression/insomnia presenting with fall [patient reportedly took 6 tablets of 50 mg Benadryl OTC to assist with her sleep last night].  She was attempting to get out of bed and fell down.  Patient was brought in secondary to fall.    CT head negative for acute findings  CT L-spine notes Chronic L5 pars defects with grade 1 L5 on S1 anterolisthesis noted. 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  Adding CBC/CMP/ethanol/salicylate/Tylenol level within normal limits  Appreciate toxicology input.  For now continue with supportive care. Watch for any anticholinergic symptoms; hypertension, tachycardia, elevated temperature.  Refer to toxicology note for details.  Patient being placed on one-to-one observation for now  Suicidal precautions  Psychiatry consulted.  On telemetry.    Mixed hyperlipidemia  Assessment & Plan  Hold statin given patient drowsy.  Resume at discharge.    Depressive disorder  Assessment & Plan  Typically on Paxil, currently on hold given patient drowsy  Monitor           Code Status: level 1  POLST: POLST form is not discussed and not completed at this time.    Anticipated Length of Stay:  Patient will be admitted on an Observation basis with an anticipated length of stay of  less than 2 midnights.   Justification for Hospital Stay: observation    Total Time for Visit, including Counseling / Coordination of Care: 30 minutes.  Greater than 50% of this total time spent on direct patient counseling and coordination of care.    Chief Complaint:   benadryl  overdose    History of Present Illness:    Marie Pope is a 73 y.o. female with underlying history of depression/insomnia presenting with fall patient reportedly took 6 tablets of 50 mg Benadryl to assist with her sleep overnight.  She states she lost her sister few days ago and has been feeling more depressed and having difficulty sleeping.  She does state this was accidental in nature.    Patient is currently drowsy but arousable on calling out name.  She is oriented x 3.  She is requiring 2 L O2 via nasal cannula but denies any shortness of breath, cough currently.  No nausea or vomiting.  No headaches.  She has been seen by toxicology and recommendation is for observation.  No antidote available and continue with supportive care.    Review of Systems:    Review of Systems   Constitutional:  Negative for chills and fever.   HENT:  Negative for ear pain and sore throat.    Eyes:  Negative for pain and visual disturbance.   Respiratory:  Negative for cough and shortness of breath.    Cardiovascular:  Negative for chest pain and palpitations.   Gastrointestinal:  Negative for abdominal pain and vomiting.   Genitourinary:  Negative for dysuria and hematuria.   Musculoskeletal:  Negative for arthralgias and back pain.   Skin:  Negative for color change and rash.   Neurological:  Positive for weakness. Negative for seizures and syncope.   All other systems reviewed and are negative.      Past Medical and Surgical History:     Past Medical History:   Diagnosis Date    Back pain     Depression     Hyperlipemia     Hypertension     Insomnia        Past Surgical History:   Procedure Laterality Date    CHOLECYSTECTOMY      TUBAL LIGATION         Meds/Allergies:    Prior to Admission medications    Medication Sig Start Date End Date Taking? Authorizing Provider   albuterol (2.5 mg/3 mL) 0.083 % nebulizer solution Take 3 mL (2.5 mg total) by nebulization every 6 (six) hours as needed for wheezing or shortness of  breath 6/1/23   Rickie Bauer DO   albuterol (ProAir HFA) 90 mcg/act inhaler Inhale 2 puffs every 6 (six) hours as needed for wheezing 6/1/23   Rickie Bauer DO   atorvastatin (LIPITOR) 40 mg tablet Take 1 tablet (40 mg total) by mouth daily at bedtime 1/26/24   Christianne Elizalde MD   LORazepam (Ativan) 0.5 mg tablet Take one tablet before procedure. 6/14/23   Lucia Posada DO   PARoxetine (PAXIL) 20 mg tablet TAKE 1 TABLET BY MOUTH EVERY DAY 3/19/24   Lucia Posada DO   zolpidem (AMBIEN) 10 mg tablet TAKE 1 TABLET BY MOUTH AT BEDTIME AS NEEDED FOR SLEEP 3/28/24   PETER Ocasio     I have reviewed home medications using allscripts.    Allergies: No Known Allergies    Social History:     Marital Status: /Civil Union     Substance Use History:   Social History     Substance and Sexual Activity   Alcohol Use No     Social History     Tobacco Use   Smoking Status Never   Smokeless Tobacco Former     Social History     Substance and Sexual Activity   Drug Use No       Family History:    non-contributory    Physical Exam:     Vitals:   Blood Pressure: 131/66 (03/29/24 1330)  Pulse: 58 (03/29/24 1330)  Temperature: 97.6 °F (36.4 °C) (03/29/24 1330)  Temp Source: Oral (03/29/24 1330)  Respirations: 22 (03/29/24 1330)  SpO2: 96 % (03/29/24 1330)    Physical Exam  Vitals reviewed.   Constitutional:       General: She is not in acute distress.     Appearance: She is not ill-appearing.   HENT:      Head: Normocephalic and atraumatic.      Nose: Nose normal. No congestion.      Mouth/Throat:      Mouth: Mucous membranes are moist.      Pharynx: Oropharynx is clear.   Eyes:      Conjunctiva/sclera: Conjunctivae normal.      Pupils: Pupils are equal, round, and reactive to light.   Cardiovascular:      Rate and Rhythm: Normal rate and regular rhythm.      Pulses: Normal pulses.   Pulmonary:      Effort: Pulmonary effort is normal. No respiratory distress.      Breath sounds:  Normal breath sounds. No wheezing or rales.   Abdominal:      General: Abdomen is flat. Bowel sounds are normal. There is no distension.      Palpations: Abdomen is soft.      Tenderness: There is no abdominal tenderness. There is no guarding.   Musculoskeletal:         General: No swelling. Normal range of motion.      Cervical back: Normal range of motion and neck supple.   Skin:     General: Skin is warm and dry.      Findings: No rash.   Neurological:      Mental Status: She is alert and oriented to person, place, and time.   Psychiatric:         Mood and Affect: Mood normal.         Behavior: Behavior normal.       Additional Data:     Lab Results: I have personally reviewed pertinent reports.      Results from last 7 days   Lab Units 03/29/24  1103   WBC Thousand/uL 4.34   HEMOGLOBIN g/dL 12.3   HEMATOCRIT % 39.5   PLATELETS Thousands/uL 225   NEUTROS PCT % 55   LYMPHS PCT % 28   MONOS PCT % 10   EOS PCT % 6     Results from last 7 days   Lab Units 03/29/24  1103   SODIUM mmol/L 140   POTASSIUM mmol/L 3.6   CHLORIDE mmol/L 103   CO2 mmol/L 28   BUN mg/dL 9   CREATININE mg/dL 0.68   ANION GAP mmol/L 9   CALCIUM mg/dL 9.2   ALBUMIN g/dL 3.7   TOTAL BILIRUBIN mg/dL 0.93   ALK PHOS U/L 92   ALT U/L 9   AST U/L 19   GLUCOSE RANDOM mg/dL 113                       Imaging: I have personally reviewed pertinent reports.      CT head without contrast   Final Result by Guille Manzano MD (03/29 1214)      No acute intracranial abnormality.  Chronic microangiopathic changes.                  Workstation performed: [id number]         CT spine lumbar without contrast   Final Result by Kin Arteaga MD (03/29 4143)   1. No evidence of acute posttraumatic injury. No vertebral compression fracture.   2. Chronic L5 pars defects with grade 1 L5 on S1 anterolisthesis noted.   3. 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         Workstation performed: [id number]         XR chest 1 view portable   ED Interpretation by Ghada Dailey DO (03/29 1313)   No acute abnormality in the chest.            Allscripts / Epic Records Reviewed: Yes     ** Please Note: This note has been constructed using a voice recognition system. **

## 2024-03-30 LAB
ALBUMIN SERPL BCP-MCNC: 3.5 G/DL (ref 3.5–5)
ALP SERPL-CCNC: 84 U/L (ref 34–104)
ALT SERPL W P-5'-P-CCNC: 10 U/L (ref 7–52)
ANION GAP SERPL CALCULATED.3IONS-SCNC: 8 MMOL/L (ref 4–13)
AST SERPL W P-5'-P-CCNC: 20 U/L (ref 13–39)
BILIRUB SERPL-MCNC: 0.61 MG/DL (ref 0.2–1)
BUN SERPL-MCNC: 8 MG/DL (ref 5–25)
CALCIUM SERPL-MCNC: 8.7 MG/DL (ref 8.4–10.2)
CHLORIDE SERPL-SCNC: 106 MMOL/L (ref 96–108)
CO2 SERPL-SCNC: 26 MMOL/L (ref 21–32)
CREAT SERPL-MCNC: 0.61 MG/DL (ref 0.6–1.3)
ERYTHROCYTE [DISTWIDTH] IN BLOOD BY AUTOMATED COUNT: 13.4 % (ref 11.6–15.1)
GFR SERPL CREATININE-BSD FRML MDRD: 89 ML/MIN/1.73SQ M
GLUCOSE P FAST SERPL-MCNC: 120 MG/DL (ref 65–99)
GLUCOSE SERPL-MCNC: 120 MG/DL (ref 65–140)
HCT VFR BLD AUTO: 36.2 % (ref 34.8–46.1)
HGB BLD-MCNC: 11.6 G/DL (ref 11.5–15.4)
MCH RBC QN AUTO: 25.9 PG (ref 26.8–34.3)
MCHC RBC AUTO-ENTMCNC: 32 G/DL (ref 31.4–37.4)
MCV RBC AUTO: 81 FL (ref 82–98)
PLATELET # BLD AUTO: 216 THOUSANDS/UL (ref 149–390)
PMV BLD AUTO: 11.4 FL (ref 8.9–12.7)
POTASSIUM SERPL-SCNC: 3.4 MMOL/L (ref 3.5–5.3)
PROT SERPL-MCNC: 6.5 G/DL (ref 6.4–8.4)
RBC # BLD AUTO: 4.48 MILLION/UL (ref 3.81–5.12)
SODIUM SERPL-SCNC: 140 MMOL/L (ref 135–147)
WBC # BLD AUTO: 5.54 THOUSAND/UL (ref 4.31–10.16)

## 2024-03-30 PROCEDURE — 85027 COMPLETE CBC AUTOMATED: CPT | Performed by: FAMILY MEDICINE

## 2024-03-30 PROCEDURE — 97163 PT EVAL HIGH COMPLEX 45 MIN: CPT

## 2024-03-30 PROCEDURE — 99232 SBSQ HOSP IP/OBS MODERATE 35: CPT | Performed by: FAMILY MEDICINE

## 2024-03-30 PROCEDURE — 80053 COMPREHEN METABOLIC PANEL: CPT | Performed by: FAMILY MEDICINE

## 2024-03-30 RX ORDER — ACETAMINOPHEN 325 MG/1
650 TABLET ORAL EVERY 6 HOURS PRN
Status: DISCONTINUED | OUTPATIENT
Start: 2024-03-30 | End: 2024-03-31 | Stop reason: HOSPADM

## 2024-03-30 RX ORDER — PAROXETINE HYDROCHLORIDE 20 MG/1
20 TABLET, FILM COATED ORAL DAILY
Status: DISCONTINUED | OUTPATIENT
Start: 2024-03-30 | End: 2024-03-31 | Stop reason: HOSPADM

## 2024-03-30 RX ORDER — POTASSIUM CHLORIDE 20 MEQ/1
40 TABLET, EXTENDED RELEASE ORAL ONCE
Status: COMPLETED | OUTPATIENT
Start: 2024-03-30 | End: 2024-03-30

## 2024-03-30 RX ADMIN — PAROXETINE 20 MG: 20 TABLET, FILM COATED ORAL at 10:53

## 2024-03-30 RX ADMIN — POTASSIUM CHLORIDE 40 MEQ: 1500 TABLET, EXTENDED RELEASE ORAL at 08:24

## 2024-03-30 RX ADMIN — ACETAMINOPHEN 650 MG: 325 TABLET, FILM COATED ORAL at 05:54

## 2024-03-30 RX ADMIN — ACETAMINOPHEN 650 MG: 325 TABLET, FILM COATED ORAL at 15:40

## 2024-03-30 NOTE — PLAN OF CARE
Problem: PHYSICAL THERAPY ADULT  Goal: Performs mobility at highest level of function for planned discharge setting.  See evaluation for individualized goals.  Description: Treatment/Interventions: Functional transfer training, Elevations, Therapeutic exercise, Endurance training, Patient/family training, Bed mobility, Gait training, Spoke to nursing          See flowsheet documentation for full assessment, interventions and recommendations.  Note: Prognosis: Good  Problem List: Decreased strength, Decreased endurance, Impaired balance, Decreased mobility, Decreased cognition, Decreased safety awareness  Assessment: Pt is 73 y.o. female seen for high-complexity PT evaluation on 3/30/2024 s/p admit to [facility] on 3/29/2024 w/ Antihistamines overdose. PT was consulted to assess pt's functional mobility and d/c needs. Order placed for PT eval and tx, w/ activity as tolerated order. PTA, pt was living in a two story home with 2 [address] MIG[address]EL, with her spouse, and reports independence at baseline with ADLs and functional mobility without AD. At time of eval, patient completed STS CGA and ambulated 55' min HHA; pt with narrow base of support and 1 LOB posterior and L during straight path gait. Upon evaluation, pt presenting with impaired functional mobility d/t decreased endurance, impaired balance, decreased mobility, decreased cognition, and decreased safety awareness. Pertinent PMHx and current co-morbidities affecting pt's physical performance at time of assessment include: antihistamines overdose, depressive disorder, fall, HTN, back pain. Personal factors affecting pt at time of eval include: lives in 2 story house, stairs to enter home, inability to navigate community distances, inability to navigate level surfaces w/o external assistance, decreased cognition, and positive fall history. The following objective measures performed on IE also reveal limitations: AM-PAC 6-Clicks: 17/24. Pt's clinical  presentation is currently unstable/unpredictable seen in pt's presentation of advanced age, abnormal lab value(s), need for input for task focus and mobility technique, need for minimal assist w/ all phases of mobility when usually mobilizing independently, tolerance to only 70 feet of ambulation, and ongoing medical assessment. Overall, pt's rehab potential and prognosis to return to PLOF is good as impacted by objective findings, warranting pt to receive further skilled PT interventions to address impairments, activity limitations, and participation restrictions. Pt to benefit from continued PT services to address deficits as defined above and maximize level of functional independent mobility and consistency. From PT/mobility standpoint, recommendation at time of d/c would be level 2, moderate resource intensity in order to facilitate return to PLOF.  Barriers to Discharge: Inaccessible home environment     Rehab Resource Intensity Level, PT: II (Moderate Resource Intensity)    See flowsheet documentation for full assessment.   Violet Hdez; PT, DPT

## 2024-03-30 NOTE — CONSULTS
'  TeleConsultation - Behavioral Health   Marie Pope 73 y.o. female MRN: [id number]  Unit/Bed#: -01 Encounter: [id number]        REQUIRED DOCUMENTATION:     1. This service was provided via Telemedicine.  2. Provider located at [place].  3. TeleMed provider: Kristin Valdez MD.  4. Identify all parties in room with patient during tele consult: Patient   5.Patient was then informed that this was a Telemedicine visit and that the exam was being conducted confidentially over secure lines. My office door was closed. No one else was in the room.  Patient acknowledged consent and understanding of privacy and security of the Telemedicine visit, and gave us permission to have the assistant stay in the room in order to assist with the history and to conduct the exam.  I informed the patient that I have reviewed their record in Epic and presented the opportunity for them to ask any questions regarding the visit today.  The patient agreed to participate.      Discussed with Sharon Florence MD      Assessment/Plan     Present on Admission:   Depressive disorder   Mixed hyperlipidemia    Assessment:     Unspecified Mood Disorder    Prior psychiatric consult reviewed, agree with prior recommendations.  Patient is experiencing some depressive symptoms but not endorsing any symptoms warranting involuntary inpatient psychiatric admission and is stable for discharge home with outpatient follow-up.    Treatment Plan:    Planned Medication Changes:    -None     Current Medications:     Current Facility-Administered Medications   Medication Dose Route Frequency Provider Last Rate    acetaminophen  650 mg Oral Q6H PRN PETER Otero      albuterol  2.5 mg Nebulization Q6H PRN Sharon Florence MD      PARoxetine  20 mg Oral Daily Sharon Florence MD         Risks / Benefits of Treatment:    Risks, benefits, and possible side effects of medications explained to patient and patient verbalizes understanding.      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 Patient states that she took some sleeping pills. Patient states that she has been feeling more depressed and that she comes from a very stressful family and that their idea of help is coming to visit and sitting for 10 min. Patient states that he had a routine with her sister but it was a lot of work. Patient denied any current SI/HI/AVH or other acute psychiatric complaints.       Psychiatric Review Of Systems:    sleep: yes  appetite changes: no  weight changes: no  energy/anergy: no  interest/pleasure/anhedonia: no  somatic symptoms: no  anxiety/panic: yes  milton: no  guilty/hopeless: no  self injurious behavior/risky behavior: no    Historical Information     Past Psychiatric History:     Psychiatric Hospitalizations:   No history of past inpatient psychiatric admissions  Outpatient Treatment History:   Denied   Suicide Attempts:   None  History of self-harm:   None  Violence History:   no  Past Psychiatric medication trials: Yes    Substance Abuse History: Denied         Family Psychiatric History:  Denied        Social History:    Education: high school diploma/GED  Learning Disabilities:  Denied   Marital history: single  Children: Yes   Living arrangement, social support: The patient lives in home with alone .  Occupational History: retired  Functioning Relationships: good support system.  Other Pertinent History: None    Traumatic History:     Abuse: None  Other Traumatic Events: none    Past Medical History:   Diagnosis  Date    Back pain     Depression     Hyperlipemia     Hypertension     Insomnia        Medical Review Of Systems:    Review of Systems    Meds/Allergies     all current active meds have been reviewed  No Known Allergies    Objective     Vital signs in last 24 hours:  Temp:  [98.1 °F (36.7 °C)-98.5 °F (36.9 °C)] 98.3 °F (36.8 °C)  HR:  [59-71] 71  Resp:  [18-20] 18  BP: (118-170)/(63-74) 118/67    No intake or output data in the 24 hours ending 03/30/24 1803    Mental Status Evaluation:    Appearance:  age appropriate   Behavior:  normal   Speech:  normal pitch and normal volume   Mood:  normal   Affect:  normal   Language: naming objects   Thought Process:  normal   Associations intact associations   Thought Content:  normal   Perceptual Disturbances: None   Risk Potential: Suicidal Ideations none  Homicidal Ideations none  Potential for Aggression No   Sensorium:  person, place, and time/date   Cognition:  recent and remote memory grossly intact   Consciousness:  alert    Attention: attention span and concentration were age appropriate   Intellect: within normal limits   Fund of Knowledge: awareness of current events: President    Insight:  limited   Judgment: limited   Muscle Strength:  Muscle Tone: normal  NFT  normal   Gait/Station: normal gait/station   Motor Activity: no abnormal movements       Lab Results: I have personally reviewed all pertinent laboratory/tests results.     Most Recent Labs:   Lab Results   Component Value Date    WBC 5.54 03/30/2024    RBC 4.48 03/30/2024    HGB 11.6 03/30/2024    HCT 36.2 03/30/2024     03/30/2024    RDW 13.4 03/30/2024    NEUTROABS 2.35 03/29/2024    SODIUM 140 03/30/2024    K 3.4 (L) 03/30/2024     03/30/2024    CO2 26 03/30/2024    BUN 8 03/30/2024    CREATININE 0.61 03/30/2024    GLUC 120 03/30/2024    GLUF 120 (H) 03/30/2024    CALCIUM 8.7 03/30/2024    AST 20 03/30/2024    ALT 10 03/30/2024    ALKPHOS 84 03/30/2024    TP 6.5 03/30/2024    ALB 3.5  03/30/2024    TBILI 0.61 03/30/2024    CHOLESTEROL 195 07/26/2022    HDL 47 (L) 07/26/2022    TRIG 129 07/26/2022    LDLCALC 122 (H) 07/26/2022    NONHDLC 174 07/06/2021    OIQ2MMGNPMYM 2.948 07/26/2022    HGBA1C 6.1 (H) 11/15/2022     11/15/2022       Imaging Studies: XR chest 1 view portable    Result Date: 3/29/2024  Narrative: XR CHEST PORTABLE INDICATION: accidental OD. COMPARISON: 5/31/2023 FINDINGS: Clear lungs. No pneumothorax or pleural effusion. Unremarkable cardiomediastinal silhouette.  Atherosclerotic vascular calcifications noted. Bones are unremarkable for age. Normal upper abdomen.     Impression: No acute cardiopulmonary disease. Workstation performed: [id number]     CT spine lumbar without contrast    Result Date: 3/29/2024  Narrative: CT LUMBAR SPINE INDICATION:   LBP s/p fall. COMPARISON: None. TECHNIQUE:  Contiguous axial images through the lumbar spine were obtained. Sagittal and coronal reconstructions were performed. IV Contrast: None Radiation dose length product (DLP) for this visit:  518 mGy-cm .  This examination, like all CT scans performed in the [facility], was performed utilizing techniques to minimize radiation dose exposure, including the use of iterative reconstruction and automated exposure control. IMAGE QUALITY:  Diagnostic. FINDINGS: ALIGNMENT:  There are 5 lumbar type vertebral bodies. Grade 1 L5 on S1 anterolisthesis secondary to chronic bilateral pars defects at this level. Mild dextroscoliosis with apex at L3-4. VERTEBRAE:  No fracture.  No lytic or blastic lesion. DEGENERATIVE CHANGES: Lower Thoracic spine:  Normal lower thoracic disc spaces. L1-2: Minor bulge and facet arthrosis without stenosis. L2-3: Diffuse disc bulge with bilateral facet arthrosis. Mild central canal encroachment without significant foraminal stenosis. L3-4: Disc bulge with superimposed left foraminal disc osteophyte complex resulting in moderate to severe left foraminal  stenosis. Bilateral facet arthrosis. Mild right foraminal narrowing. Mild central canal stenosis. L4-5: Loss of disc height with vacuum phenomenon. Disc osteophyte complex and bilateral facet arthrosis resulting in mild to moderate right with moderate left foraminal stenosis. Central canal remains patent. L5-S1: Anterolisthesis secondary to chronic pars defects as above. Diffuse disc bulge with prominent unroofing of the disc. The central canal remains patent at this level. There is severe right and moderate to severe left foraminal stenosis. Correlate clinically for right greater than left L5 radiculopathy. PARASPINAL SOFT TISSUES:   Normal.     Impression: 1. No evidence of acute posttraumatic injury. No vertebral compression fracture. 2. Chronic L5 pars defects with grade 1 L5 on S1 anterolisthesis noted. 3. 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 Workstation performed: [id number]     CT head without contrast    Result Date: 3/29/2024  Narrative: CT BRAIN - WITHOUT CONTRAST INDICATION:   fall out of bed, possible head injury, patient accidentally OD on OTC sleep aid. COMPARISON: CT head August 2, 2020. Correlation with MR brain June 23, 2023 TECHNIQUE:  CT examination of the brain was performed.  Multiplanar 2D reformatted images were created from the source data. Radiation dose length product (DLP) for this visit:  873 mGy-cm .  This examination, like all CT scans performed in the [facility], was performed utilizing techniques to minimize radiation dose exposure, including the use of iterative reconstruction and automated exposure control. IMAGE QUALITY:  Diagnostic. FINDINGS: PARENCHYMA: Decreased attenuation is noted in periventricular and subcortical white matter demonstrating an appearance that is statistically most likely to represent  moderate microangiopathic change. No CT signs of acute infarction.  No intracranial mass, mass effect or midline shift.  No acute parenchymal hemorrhage.  No intracranial mass, mass effect or midline shift. No CT signs of acute infarction.  No acute parenchymal hemorrhage. VENTRICLES AND EXTRA-AXIAL SPACES:  Normal for the patient's age. VISUALIZED ORBITS: Normal visualized orbits. PARANASAL SINUSES: Normal visualized paranasal sinuses. CALVARIUM AND EXTRACRANIAL SOFT TISSUES:  Normal.     Impression: No acute intracranial abnormality.  Chronic microangiopathic changes. Workstation performed: [id number]     EKG/Pathology/Other Studies:   Lab Results   Component Value Date    VENTRATE 61 03/29/2024    ATRIALRATE 61 03/29/2024    PRINT 170 03/29/2024    QRSDINT 78 03/29/2024    QTINT 444 03/29/2024    QTCINT 446 03/29/2024    PAXIS 57 03/29/2024    QRSAXIS 26 03/29/2024    TWAVEAXIS 171 03/29/2024        Code Status: Level 1 - Full Code  Advance Directive and Living Will:      Power of :    POLST:      Screenings:    1. Nutrition Screening  Nutrition Assessment (completed by Staff):      2. Pain Screening  Pain Screening: Pain Assessment  Pain Assessment Tool: 0-10  Pain Score: 10  Pain Location/Orientation: Orientation: Right, Location: Abdomen  Patient's Stated Pain Goal: No pain    3. Suicide Screening  ED Crisis Suicide Risk Assessment:        Counseling / Coordination of Care:    Total floor / unit time spent today 30 minutes. Greater than 50% of total time was spent with the patient and / or family counseling and / or coordination of care. A description of the counseling / coordination of care: Direct Patient Care, Chart Review, and Documentation.

## 2024-03-30 NOTE — PHYSICAL THERAPY NOTE
PHYSICAL THERAPY EVALUATION  NAME:  Marie Pope  DATE: 03/30/24    AGE:   73 y.o.  Mrn:   [id number]  ADMIT DX:  Hypoxia [R09.02]  Accidental overdose [T50.901A]  Accidental fall from bed, initial encounter [W06.XXXA]  Antihistamines overdose, accidental or unintentional, initial encounter [T45.0X1A]  Problem List:   Patient Active Problem List   Diagnosis    Rosacea    Seborrheic keratosis    High cholesterol    Insomnia    Depressive disorder    Migraine    Hyperglycemia    Lumbar radiculopathy    Chronic pain of both knees    Prediabetes    Mixed hyperlipidemia    TIA (transient ischemic attack)    Antihistamines overdose       Past Medical History  Past Medical History:   Diagnosis Date    Back pain     Depression     Hyperlipemia     Hypertension     Insomnia        Past Surgical History  Past Surgical History:   Procedure Laterality Date    CHOLECYSTECTOMY      TUBAL LIGATION         Length Of Stay: 0  Performed at least 2 patient identifiers during session: Name and Birthday         03/30/24 1348   PT Last Visit   PT Visit Date 03/30/24   Note Type   Note type Evaluation   Pain Assessment   Pain Assessment Tool 0-10   Pain Score No Pain   Restrictions/Precautions   Weight Bearing Precautions Per Order No   Braces or Orthoses   (none per pt)   Other Precautions 1:1;Cognitive;Fall Risk;Spinal precautions;Chair Alarm;Bed Alarm   Home Living   Type of Home House   Home Layout Two level;Bed/bath upstairs;Stairs to enter with rails  (2 [address]. FOS to bedroom)   Bathroom Accessibility Accessible   Home Equipment Walker   Additional Comments no AD used prior to admission per pt   Prior Function   Level of [place] Independent with ADLs;Independent with functional mobility;Independent with IADLS   Lives With Spouse   Receives Help From Family   IADLs Family/Friend/Other provides transportation;Independent with meal prep;Independent with medication management  (pt reports mainly using take out services for  "meals)   Falls in the last 6 months 5 to 10  (\"more than 5,\" \"less than 10\")   General   Family/Caregiver Present Yes  (3 sons)   Cognition   Overall Cognitive Status   (questionable)   Arousal/Participation Responsive   Orientation Level Oriented to person;Oriented to place   Memory Decreased recall of recent events;Decreased recall of precautions   Following Commands Follows one step commands with increased time or repetition   Comments Pt agreeable to PT evaluation   RLE Assessment   RLE Assessment X   Strength RLE   RLE Overall Strength 4-/5   LLE Assessment   LLE Assessment X   Strength LLE   LLE Overall Strength 4-/5   Vision-Basic Assessment   Patient Visual Report   (pt denies acute visual changes)   Coordination   Sensation WFL   Light Touch   RLE Light Touch Grossly intact   LLE Light Touch Grossly intact   Bed Mobility   Additional Comments bed mobility not tested as pt seated in bedside chair at start/end of session   Transfers   Sit to Stand   (CGA)   Additional items Assist x 1;Increased time required;Armrests   Stand to Sit   (CGA)   Additional items Assist x 1;Increased time required;Armrests   Additional Comments no AD used during transfers   Ambulation/Elevation   Gait pattern Improper Weight shift;Narrow ZIGGY;Decreased foot clearance  (mild path deviation)   Gait Assistance 4  Minimal assist   Additional items Assist x 1;Verbal cues   Assistive Device   (hand held assist)   Distance 55'   Stair Management Assistance Not tested   Ambulation/Elevation Additional Comments recommend trial with RW during future sessions to decrease assist required from caregivers   Balance   Static Sitting Good   Dynamic Sitting Fair +   Static Standing Fair -   Dynamic Standing Poor +   Ambulatory Poor +   Activity Tolerance   Activity Tolerance Patient limited by fatigue   Nurse Made Aware MARCELO Fontanez confirmed pt appropriate for PT session and made aware of session outcomes   Assessment   Prognosis Good   Problem List " Decreased strength;Decreased endurance;Impaired balance;Decreased mobility;Decreased cognition;Decreased safety awareness   Assessment Pt is 73 y.o. female seen for high-complexity PT evaluation on 3/30/2024 s/p admit to [facility] on 3/29/2024 w/ Antihistamines overdose. PT was consulted to assess pt's functional mobility and d/c needs. Order placed for PT eval and tx, w/ activity as tolerated order. PTA, pt was living in a two story home with 2 [address], with her spouse, and reports independence at baseline with ADLs and functional mobility without AD. At time of eval, patient completed STS CGA and ambulated 55' min HHA; pt with narrow base of support and 1 LOB posterior and L during straight path gait. Upon evaluation, pt presenting with impaired functional mobility d/t decreased endurance, impaired balance, decreased mobility, decreased cognition, and decreased safety awareness. Pertinent PMHx and current co-morbidities affecting pt's physical performance at time of assessment include: antihistamines overdose, depressive disorder, fall, HTN, back pain. Personal factors affecting pt at time of eval include: lives in 2 story house, stairs to enter home, inability to navigate community distances, inability to navigate level surfaces w/o external assistance, decreased cognition, and positive fall history. The following objective measures performed on IE also reveal limitations: AM-PAC 6-Clicks: 17/24. Pt's clinical presentation is currently unstable/unpredictable seen in pt's presentation of advanced age, abnormal lab value(s), need for input for task focus and mobility technique, need for minimal assist w/ all phases of mobility when usually mobilizing independently, tolerance to only 70 feet of ambulation, and ongoing medical assessment. Overall, pt's rehab potential and prognosis to return to PLOF is good as impacted by objective findings, warranting pt to receive further skilled PT interventions to  address impairments, activity limitations, and participation restrictions. Pt to benefit from continued PT services to address deficits as defined above and maximize level of functional independent mobility and consistency. From PT/mobility standpoint, recommendation at time of d/c would be level 2, moderate resource intensity in order to facilitate return to PLOF.   Barriers to Discharge Inaccessible home environment   Goals   STG Expiration Date 04/09/24   Short Term Goal #1 In 10 days: Perform all bed mobility tasks modified independent to decrease caregiver burden, Perform all transfers modified independent to improve independence, Ambulate > 200 ft. with least restrictive assistive device modified independent w/o LOB and w/ normalized gait pattern 100% of the time, Navigate 12 stairs modified independent with unilateral handrail to facilitate return to previous living environment, and Increase all balance 1/2 grade to decrease risk for falls   PT Treatment Day 0   Plan   Treatment/Interventions Functional transfer training;Elevations;Therapeutic exercise;Endurance training;Patient/family training;Bed mobility;Gait training;Spoke to nursing   PT Frequency 3-5x/wk   Discharge Recommendation   Rehab Resource Intensity Level, PT II (Moderate Resource Intensity)   AM-PAC Basic Mobility Inpatient   Turning in Flat Bed Without Bedrails 3   Lying on Back to Sitting on Edge of Flat Bed Without Bedrails 3   Moving Bed to Chair 3   Standing Up From Chair Using Arms 3   Walk in Room 3   Climb 3-5 Stairs With Railing 2   Basic Mobility Inpatient Raw Score 17   Basic Mobility Standardized Score 39.67   [place] Highest Level Of Mobility   -HL Goal 5: Stand one or more mins   -HLM Achieved 7: Walk 25 feet or more   End of Consult   Patient Position at End of Consult All needs within reach;Bed/Chair alarm activated;Bedside chair  (virtual 1:1 and family present)     Time In: 1345  Time Out: 1355  Total Evaluation  Minutes: 10    Violet Hdez, PT

## 2024-03-30 NOTE — PLAN OF CARE
Problem: NEUROSENSORY - ADULT  Goal: Achieves stable or improved neurological status  Description: INTERVENTIONS  - Monitor and report changes in neurological status  - Monitor vital signs such as temperature, blood pressure, glucose, and any other labs ordered   - Initiate measures to prevent increased intracranial pressure  - Monitor for seizure activity and implement precautions if appropriate      Outcome: Progressing  Goal: Remains free of injury related to seizures activity  Description: INTERVENTIONS  - Maintain airway, patient safety  and administer oxygen as ordered  - Monitor patient for seizure activity, document and report duration and description of seizure to physician/advanced practitioner  - If seizure occurs,  ensure patient safety during seizure  - Reorient patient post seizure  - Seizure pads on all 4 side rails  - Instruct patient/family to notify RN of any seizure activity including if an aura is experienced  - Instruct patient/family to call for assistance with activity based on nursing assessment  - Administer anti-seizure medications if ordered    Outcome: Progressing  Goal: Achieves maximal functionality and self care  Description: INTERVENTIONS  - Monitor swallowing and airway patency with patient fatigue and changes in neurological status  - Encourage and assist patient to increase activity and self care.   - Encourage visually impaired, hearing impaired and aphasic patients to use assistive/communication devices  Outcome: Progressing     Problem: CARDIOVASCULAR - ADULT  Goal: Maintains optimal cardiac output and hemodynamic stability  Description: INTERVENTIONS:  - Monitor I/O, vital signs and rhythm  - Monitor for S/S and trends of decreased cardiac output  - Administer and titrate ordered vasoactive medications to optimize hemodynamic stability  - Assess quality of pulses, skin color and temperature  - Assess for signs of decreased coronary artery perfusion  - Instruct patient to  report change in severity of symptoms  Outcome: Progressing  Goal: Absence of cardiac dysrhythmias or at baseline rhythm  Description: INTERVENTIONS:  - Continuous cardiac monitoring, vital signs, obtain 12 lead EKG if ordered  - Administer antiarrhythmic and heart rate control medications as ordered  - Monitor electrolytes and administer replacement therapy as ordered  Outcome: Progressing     Problem: METABOLIC, FLUID AND ELECTROLYTES - ADULT  Goal: Electrolytes maintained within normal limits  Description: INTERVENTIONS:  - Monitor labs and assess patient for signs and symptoms of electrolyte imbalances  - Administer electrolyte replacement as ordered  - Monitor response to electrolyte replacements, including repeat lab results as appropriate  - Instruct patient on fluid and nutrition as appropriate  Outcome: Progressing  Goal: Fluid balance maintained  Description: INTTIONS:  - Monitor labs   - Monitor I/O and WT  - Instruct patient on fluid and nutrition as appropriate  - Assess for signs & symptoms of volume excess or deficit  Outcome: Progressing  Goal: Glucose maintained within target range  Description: INTERVENTIONS:  - Monitor Blood Glucose as ordered  - Assess for signs and symptoms of hyperglycemia and hypoglycemia  - Administer ordered medications to maintain glucose within target range  - Assess nutritional intake and initiate nutrition service referral as needed  Outcome: Progressing

## 2024-03-30 NOTE — CASE MANAGEMENT
Case Management Progress Note    Patient name Marie Zelayar  Location /-01 MRN [id number]  : 1950 Date 3/30/2024       LOS (days): 0  Geometric Mean LOS (GMLOS) (days):   Days to GMLOS:        OBJECTIVE:     Current admission status: Observation  Preferred Pharmacy:   [id number] [place], [place] - [address]  [facility] [address]  Phone: [phone] Fax: [phone]    Primary Care Provider: Lucia Posada DO  Primary Insurance: [employer] [facility]  Secondary Insurance:     PROGRESS NOTE:  CM attempted to meet with patient at bedside for CM assessment.  Patient sleeping soundly.  CM attempted to reach spouse at [phone], no answer.  CM will attempt again.

## 2024-03-30 NOTE — PROGRESS NOTES
"[facility]  Progress Note  Name: Marie Pope I  MRN: [id number]  Unit/Bed#: -01 I Date of Admission: 3/29/2024   Date of Service: 3/30/2024 I Hospital Day: 0    Assessment/Plan   * Antihistamines overdose  Assessment & Plan  72yo lady with underlying history of depression/insomnia presenting with fall [patient reportedly took 6 tablets of 50 mg Benadryl OTC to assist with her sleep last night].  She was attempting to get out of bed and fell down.  Patient was brought in secondary to fall.    Per patient and further dw daughter \"Magy\" -> patient has had significant depression since her sister passed away on 3/2. She was her sisters caregiver. She also said her mother is very dependent on her paxil.    CT head negative for acute findings  CT L-spine notes Chronic L5 pars defects with grade 1 L5 on S1 anterolisthesis noted. 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  CBC/CMP/ethanol/salicylate/Tylenol level within normal limits  Appreciate toxicology input.  For now continue with supportive care. No anticholinergic symptoms; hypertension, tachycardia, elevated temperature noted .  Refer to toxicology note for details.  Patient  placed on one-to-one observation for now  Psychiatry consulted.They recommended taking patient off suicidal precautions. Likely accidental OD.  Re consulted psychiatry this am due to patients behaviors ,bizarre statements and severe depression described by patients daughter.   On telemetry.    Mixed hyperlipidemia  Assessment & Plan  Resume statin    Depressive disorder  Assessment & Plan  Typically on Paxil, was on hold initially given patient drowsy  Resume paxil this am             VTE Pharmacologic Prophylaxis: VTE Score: 2 ambulatory    Mobility:   Basic Mobility Inpatient Raw Score: 17  -[facility] Goal: 5: " "Stand one or more mins  JH-HLM Achieved: 7: Walk 25 feet or more  JH-HLM Goal NOT achieved. Continue with multidisciplinary rounding and encourage appropriate mobility to improve upon JH-HLM goals.    Patient Centered Rounds: I performed bedside rounds with nursing staff today.   Discussions with Specialists or Other Care Team Provider: carolee    Education and Discussions with Family / Patient: Updated  (daughter) via phone.    Total Time Spent on Date of Encounter in care of patient: 40 mins. This time was spent on one or more of the following: performing physical exam; counseling and coordination of care; obtaining or reviewing history; documenting in the medical record; reviewing/ordering tests, medications or procedures; communicating with other healthcare professionals and discussing with patient's family/caregivers.    Current Length of Stay: 0 day(s)  Current Patient Status: Observation   Certification Statement: The patient will continue to require additional inpatient hospital stay due to needs re psych eval  Discharge Plan: ?24-48hrs    Code Status: Level 1 - Full Code    Subjective:   Patient seen at bedside this morning  She was awake alert oriented x 3  Patient was making bizarre statements about \"jumping out of the window\" to her daughter over the phone per RN if not picked up today.  She was also stating she did not want her  controlling her.  She also kept re- questioning her admission as to when and how did she get to the hospital even after explanation multiple times.  Patient was also noted to be slightly wobbly on her feet.  I spoke to her daughter over the phone who noted that patient can exhibit features of psychosis if not on her Paxil.  Her Paxil was held yesterday due to her Benadryl overdose and drowsy status.    Objective:     Vitals:   Temp (24hrs), Av.3 °F (36.8 °C), Min:98.1 °F (36.7 °C), Max:98.5 °F (36.9 °C)    Temp:  [98.1 °F (36.7 °C)-98.5 °F (36.9 °C)] 98.3 °F " (36.8 °C)  HR:  [59-71] 71  Resp:  [18-21] 18  BP: (118-170)/(63-78) 118/67  SpO2:  [89 %-96 %] 92 %  Body mass index is 25.68 kg/m².     Input and Output Summary (last 24 hours):   No intake or output data in the 24 hours ending 03/30/24 1630    Physical Exam:   Physical Exam  Constitutional:       General: She is not in acute distress.     Appearance: She is not toxic-appearing.   HENT:      Mouth/Throat:      Mouth: Mucous membranes are moist.      Pharynx: Oropharynx is clear.   Cardiovascular:      Rate and Rhythm: Normal rate and regular rhythm.   Pulmonary:      Effort: Pulmonary effort is normal. No respiratory distress.      Breath sounds: Normal breath sounds.   Abdominal:      General: Abdomen is flat. Bowel sounds are normal.      Palpations: Abdomen is soft.      Tenderness: There is no abdominal tenderness.   Musculoskeletal:      Right lower leg: No edema.      Left lower leg: No edema.   Neurological:      General: No focal deficit present.      Mental Status: She is alert and oriented to person, place, and time.   Psychiatric:      Comments: Abnormal behaviors          Additional Data:     Labs:  Results from last 7 days   Lab Units 03/30/24  0438 03/29/24  1103   WBC Thousand/uL 5.54 4.34   HEMOGLOBIN g/dL 11.6 12.3   HEMATOCRIT % 36.2 39.5   PLATELETS Thousands/uL 216 225   NEUTROS PCT %  --  55   LYMPHS PCT %  --  28   MONOS PCT %  --  10   EOS PCT %  --  6     Results from last 7 days   Lab Units 03/30/24  0438   SODIUM mmol/L 140   POTASSIUM mmol/L 3.4*   CHLORIDE mmol/L 106   CO2 mmol/L 26   BUN mg/dL 8   CREATININE mg/dL 0.61   ANION GAP mmol/L 8   CALCIUM mg/dL 8.7   ALBUMIN g/dL 3.5   TOTAL BILIRUBIN mg/dL 0.61   ALK PHOS U/L 84   ALT U/L 10   AST U/L 20   GLUCOSE RANDOM mg/dL 120                       Lines/Drains:  Invasive Devices       Peripheral Intravenous Line  Duration             Peripheral IV 03/29/24 Left Antecubital 1 day                      Recent Cultures (last 7 days):          Last 24 Hours Medication List:   Current Facility-Administered Medications   Medication Dose Route Frequency Provider Last Rate    acetaminophen  650 mg Oral Q6H PRN PETER Otero      albuterol  2.5 mg Nebulization Q6H PRN Sharon Florence MD      PARoxetine  20 mg Oral Daily Sharon Florence MD          Today, Patient Was Seen By: Sharon Florence MD    **Please Note: This note may have been constructed using a voice recognition system.**

## 2024-03-30 NOTE — UTILIZATION REVIEW
"Initial Clinical Review    WAS OBSERVATION 3/29/24 @ 1327 CONVERTED TO INPATIENT ADMISSION 3/30/24 @ 1733 DUE TO CONTINUED STAY REQUIRED TO CARE FOR PATIENT WITH DX: OVERDOSE.      Admission: Date/Time/Statement:   Admission Orders (From admission, onward)       Ordered        03/30/24 1733  INPATIENT ADMISSION  Once            03/29/24 1327  Place in Observation  Once                          Orders Placed This Encounter   Procedures    INPATIENT ADMISSION     Standing Status:   Standing     Number of Occurrences:   1     Order Specific Question:   Level of Care     Answer:   Med Surg [16]     Order Specific Question:   Estimated length of stay     Answer:   More than 2 Midnights     Order Specific Question:   Certification     Answer:   I certify that inpatient services are medically necessary for this patient for a duration of greater than two midnights. See H&P and MD Progress Notes for additional information about the patient's course of treatment.     ED Arrival Information       Expected   -    Arrival   3/29/2024 10:32    Acuity   Emergent              Means of arrival   Wheelchair    Escorted by   Family Member    Service   Hospitalist    Admission type   Emergency              Arrival complaint   Accidental Overdose             Chief Complaint   Patient presents with    Overdose - Accidental     Pt states she could fall asleep for days, so she took \"[employer] sleeping peels, I took 20 pills in 3 days and it did nothing\" denies SI/HI       Initial Presentation: 73 y.o. female to ED via WC from home   Present to ED with fall. She was attempting to get out of bed and fell down. Patient reportedly took 6 tablets of 50 mg Benadryl to assist with her sleep overnight. She states she lost her sister few days ago and has been feeling more depressed and having difficulty sleeping. She does state this was accidental in nature.   PMHX: depression/insomnia, HLD, HTN  Admitted to OBS with DX: Antihistamines overdose   on " "exam: akosua; hypotensive; T 99.2; drowsy but arousable. She is oriented x 3. RA sat 89% requiring 2 L O2 via nasal cannula sat 96%. She has been seen by toxicology - No antidote available   PLAN: cont ivf; monitor for any anticholinergic symptoms; suicidal precautions; psych consulted; tele monitoring    Date: 3/30/24 - CHANGED TO INPATIENT   Patient was making bizarre statements about \"jumping out of the window\" to her daughter over the phone per RN if not picked up today.  She was also stating she did not want her  controlling her.  She also kept re- questioning her admission as to when and how did she get to the hospital even after explanation multiple times.  Patient was also noted to be slightly wobbly on her feet.  I spoke to her daughter over the phone who noted that patient can exhibit features of psychosis if not on her Paxil.  Her Paxil was held yesterday due to her Benadryl overdose and drowsy status.    Psychiatry consulted.They recommended taking patient off suicidal precautions. Likely accidental OD.  Re consulted psychiatry this am due to patients behaviors ,bizarre statements and severe depression described by patients daughter.     Plan: monitor for any anticholinergic symptoms; suicidal precautions; tele monitoring; resume statin; resume paxil      Day 3    3/31/24: Has surpassed a 2nd midnight with active treatments and services.  Discharged to home      ED Triage Vitals   Temperature Pulse Respirations Blood Pressure SpO2   03/29/24 1039 03/29/24 1039 03/29/24 1039 03/29/24 1039 03/29/24 1039   99.2 °F (37.3 °C) 66 22 148/62 96 %      Temp Source Heart Rate Source Patient Position - Orthostatic VS BP Location FiO2 (%)   03/29/24 1039 03/29/24 1039 03/29/24 1039 03/29/24 1039 --   Temporal Monitor Sitting Left arm       Pain Score       03/29/24 1652       No Pain          Wt Readings from Last 1 Encounters:   03/30/24 76.6 kg (168 lb 14 oz)     Additional Vital Signs:   Date/Time Temp " Pulse Resp BP MAP (mmHg) SpO2 Calculated FIO2 (%) - Nasal Cannula Nasal Cannula O2 Flow Rate (L/min) O2 Device Patient Position - Orthostatic VS   03/31/24 0822 -- -- -- -- -- -- -- -- None (Room air) --   03/31/24 0500 -- 65 16 148/70 100 92 % -- -- None (Room air) Lying   03/30/24 2212 -- 64 18 -- -- 97 % -- -- None (Room air) --   03/30/24 2018 98.6 °F (37 °C) 60 18 127/59 84 91 % -- -- None (Room air) Lying   03/30/24 1500 98.3 °F (36.8 °C) 71 -- 118/67 86 92 % -- -- -- Sitting     Date/Time Temp Pulse Resp BP MAP (mmHg) SpO2 Calculated FIO2 (%) - Nasal Cannula Nasal Cannula O2 Flow Rate (L/min) O2 Device Patient Position - Orthostatic VS   03/30/24 0600 98.5 °F (36.9 °C) 67 -- 168/74 -- 96 % -- -- None (Room air) --   03/30/24 0256 -- 68 18 170/70 101 94 % -- -- None (Room air) Lying   03/29/24 2100 -- 59 20 152/72 100 91 % -- -- None (Room air) Lying   03/29/24 1900 -- 64 20 136/63 91 92 % -- -- None (Room air) Lying   03/29/24 1845 98.1 °F (36.7 °C) -- -- -- -- 95 % -- -- None (Room air) --   03/29/24 1658 -- 59 21 150/78 108 89 % Abnormal  -- -- None (Room air) Lying   03/29/24 1518 -- 56 20 151/65 -- 95 % -- -- -- Lying   03/29/24 1515 -- 51 Abnormal  -- -- -- 96 % -- -- -- --   03/29/24 1330 97.6 °F (36.4 °C) 58 22 131/66 91 96 % 28 2 L/min Nasal cannula Lying   03/29/24 1100 97.3 °F (36.3 °C) Abnormal  -- -- -- -- -- -- -- -- --   03/29/24 1039 99.2 °F (37.3 °C) 66 22 148/62 89 96 % -- -- None (Room air) Sitting       EKG: Normal sinus rhythm  Low voltage QRS  T wave abnormality, consider anterior ischemia  Abnormal ECG      Pertinent Labs/Diagnostic Test Results:   CT head without contrast   Final Result by Guille Manzano MD (03/29 121)      No acute intracranial abnormality.  Chronic microangiopathic changes.                  Workstation performed: [id number]         CT spine lumbar without contrast   Final Result by Kin Arteaga MD (03/29 6294)   1. No evidence of acute posttraumatic injury. No  vertebral compression fracture.   2. Chronic L5 pars defects with grade 1 L5 on S1 anterolisthesis noted.   3. 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         Workstation performed: [id number]         XR chest 1 view portable   ED Interpretation by Ghada Dailey DO (03/29 1313)   No acute abnormality in the chest.      Final Result by Chiki Orosco MD (03/29 1557)      No acute cardiopulmonary disease.            Workstation performed: [id number]              Results from last 7 days   Lab Units 03/30/24  0438 03/29/24  1103   WBC Thousand/uL 5.54 4.34   HEMOGLOBIN g/dL 11.6 12.3   HEMATOCRIT % 36.2 39.5   PLATELETS Thousands/uL 216 225   NEUTROS ABS Thousands/µL  --  2.35        Results from last 7 days   Lab Units 03/30/24  0438 03/29/24  1103   SODIUM mmol/L 140 140   POTASSIUM mmol/L 3.4* 3.6   CHLORIDE mmol/L 106 103   CO2 mmol/L 26 28   ANION GAP mmol/L 8 9   BUN mg/dL 8 9   CREATININE mg/dL 0.61 0.68   EGFR ml/min/1.73sq m 89 86   CALCIUM mg/dL 8.7 9.2   MAGNESIUM mg/dL  --  2.1     Results from last 7 days   Lab Units 03/30/24  0438 03/29/24  1103   AST U/L 20 19   ALT U/L 10 9   ALK PHOS U/L 84 92   TOTAL PROTEIN g/dL 6.5 6.7   ALBUMIN g/dL 3.5 3.7   TOTAL BILIRUBIN mg/dL 0.61 0.93   BILIRUBIN DIRECT mg/dL  --  0.16        Results from last 7 days   Lab Units 03/30/24  0438 03/29/24  1103   GLUCOSE RANDOM mg/dL 120 113        Results from last 7 days   Lab Units 03/29/24  1103   HS TNI 0HR ng/L <2        Results from last 7 days   Lab Units 03/29/24  1328   CLARITY UA  Clear   COLOR UA  Light Yellow   SPEC GRAV UA  1.009   PH UA  6.0   GLUCOSE UA mg/dl Negative   KETONES UA mg/dl Negative   BLOOD UA  Negative   PROTEIN UA mg/dl Negative   NITRITE UA  Negative   BILIRUBIN UA  Negative   UROBILINOGEN UA (BE) mg/dl <2.0   LEUKOCYTES UA   Moderate*   WBC UA /hpf 4-10*   RBC UA /hpf 1-2   BACTERIA UA /hpf None Seen   EPITHELIAL CELLS WET PREP /hpf None Seen   MUCUS THREADS  Occasional*        Results from last 7 days   Lab Units 03/29/24  1328   AMPH/METH  Negative   BARBITURATE UR  Negative   BENZODIAZEPINE UR  Negative   COCAINE UR  Negative   METHADONE URINE  Negative   OPIATE UR  Negative   PCP UR  Negative   THC UR  Negative     Results from last 7 days   Lab Units 03/29/24  1103   ETHANOL LVL mg/dL <10   ACETAMINOPHEN LVL ug/mL <2*   SALICYLATE LVL mg/dL <5          ED Treatment:   Medication Administration from 03/29/2024 1031 to 03/29/2024 1640         Date/Time Order Dose Route Action     03/29/2024 1406 EDT sodium chloride 0.9 % bolus 1,000 mL 0 mL Intravenous Stopped     03/29/2024 1108 EDT sodium chloride 0.9 % bolus 1,000 mL 1,000 mL Intravenous New Bag            Present on Admission:   Depressive disorder   Mixed hyperlipidemia      Admitting Diagnosis: Hypoxia [R09.02]  Accidental overdose [T50.901A]  Antihistamines overdose, accidental or unintentional, initial encounter [T45.0X1A]    Age/Sex: 73 y.o. female    Admission Orders: SCDs; I/O; Daily wt; regular diet    Scheduled Medications: None    PARoxetine, 20 mg, Oral, Daily      Continuous IV Infusions: None       PRN Meds:  acetaminophen, 650 mg, Oral, Q6H PRN  albuterol, 2.5 mg, Nebulization, Q6H PRN        IP CONSULT TO TOXICOLOGY  IP CONSULT TO PSYCHIATRY  IP CONSULT TO PSYCHIATRY    Network Utilization Review Department  ATTENTION: Please call with any questions or concerns to [phone] and carefully listen to the prompts so that you are directed to the right person. All voicemails are confidential.   For Discharge needs, contact Care Management DC Support Team at [phone] opt. 2  Send all requests for admission clinical reviews, approved or denied determinations and any other requests to dedicated fax number below belonging to the campus where the patient is receiving  treatment. List of dedicated fax numbers for the Facilities:  FACILITY NAME UR FAX NUMBER   ADMISSION DENIALS (Administrative/Medical Necessity) [phone]   DISCHARGE SUPPORT TEAM (NETWORK) [phone]   PARENT CHILD HEALTH (Maternity/NICU/Pediatrics) [phone]   [facility] [phone]   [facility] [phone]   [facility] [phone]   [facility] [phone]   [facility] [phone]   [facility] [phone]   [facility] [phone]   [facility] [phone]   [facility] [phone]   [facility] [phone]   [facility] [phone]   [facility] [phone]

## 2024-03-30 NOTE — ASSESSMENT & PLAN NOTE
"74yo lady with underlying history of depression/insomnia presenting with fall [patient reportedly took 6 tablets of 50 mg Benadryl OTC to assist with her sleep last night].  She was attempting to get out of bed and fell down.  Patient was brought in secondary to fall.    Per patient and further dw daughter \"Magy\" -> patient has had significant depression since her sister passed away on 3/2. She was her sisters caregiver. She also said her mother is very dependent on her paxil.    CT head negative for acute findings  CT L-spine notes Chronic L5 pars defects with grade 1 L5 on S1 anterolisthesis noted. 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  CBC/CMP/ethanol/salicylate/Tylenol level within normal limits  Appreciate toxicology input.  For now continue with supportive care. No anticholinergic symptoms; hypertension, tachycardia, elevated temperature noted .  Refer to toxicology note for details.  Patient  placed on one-to-one observation for now  Psychiatry consulted.They recommended taking patient off suicidal precautions. Likely accidental OD.  Re consulted psychiatry this am due to patients behaviors ,bizarre statements and severe depression described by patients daughter.   On telemetry.  "

## 2024-03-31 VITALS
TEMPERATURE: 98.6 F | SYSTOLIC BLOOD PRESSURE: 148 MMHG | BODY MASS INDEX: 25.59 KG/M2 | WEIGHT: 168.87 LBS | HEIGHT: 68 IN | OXYGEN SATURATION: 92 % | DIASTOLIC BLOOD PRESSURE: 70 MMHG | RESPIRATION RATE: 16 BRPM | HEART RATE: 65 BPM

## 2024-03-31 PROCEDURE — 99239 HOSP IP/OBS DSCHRG MGMT >30: CPT | Performed by: FAMILY MEDICINE

## 2024-03-31 RX ADMIN — PAROXETINE 20 MG: 20 TABLET, FILM COATED ORAL at 08:05

## 2024-03-31 NOTE — PLAN OF CARE
Problem: CARDIOVASCULAR - ADULT  Goal: Maintains optimal cardiac output and hemodynamic stability  Description: INTERVENTIONS:  - Monitor I/O, vital signs and rhythm  - Monitor for S/S and trends of decreased cardiac output  - Administer and titrate ordered vasoactive medications to optimize hemodynamic stability  - Assess quality of pulses, skin color and temperature  - Assess for signs of decreased coronary artery perfusion  - Instruct patient to report change in severity of symptoms  Outcome: Progressing     Problem: METABOLIC, FLUID AND ELECTROLYTES - ADULT  Goal: Electrolytes maintained within normal limits  Description: INTERVENTIONS:  - Monitor labs and assess patient for signs and symptoms of electrolyte imbalances  - Administer electrolyte replacement as ordered  - Monitor response to electrolyte replacements, including repeat lab results as appropriate  - Instruct patient on fluid and nutrition as appropriate  Outcome: Progressing     Problem: NEUROSENSORY - ADULT  Goal: Achieves stable or improved neurological status  Description: INTERVENTIONS  - Monitor and report changes in neurological status  - Monitor vital signs such as temperature, blood pressure, glucose, and any other labs ordered   - Initiate measures to prevent increased intracranial pressure  - Monitor for seizure activity and implement precautions if appropriate      Outcome: Progressing  Goal: Remains free of injury related to seizures activity  Description: INTERVENTIONS  - Maintain airway, patient safety  and administer oxygen as ordered  - Monitor patient for seizure activity, document and report duration and description of seizure to physician/advanced practitioner  - If seizure occurs,  ensure patient safety during seizure  - Reorient patient post seizure  - Seizure pads on all 4 side rails  - Instruct patient/family to notify RN of any seizure activity including if an aura is experienced  - Instruct patient/family to call for  assistance with activity based on nursing assessment  - Administer anti-seizure medications if ordered    Outcome: Progressing  Goal: Achieves maximal functionality and self care  Description: INTERVENTIONS  - Monitor swallowing and airway patency with patient fatigue and changes in neurological status  - Encourage and assist patient to increase activity and self care.   - Encourage visually impaired, hearing impaired and aphasic patients to use assistive/communication devices  Outcome: Progressing     Problem: CARDIOVASCULAR - ADULT  Goal: Maintains optimal cardiac output and hemodynamic stability  Description: INTERVENTIONS:  - Monitor I/O, vital signs and rhythm  - Monitor for S/S and trends of decreased cardiac output  - Administer and titrate ordered vasoactive medications to optimize hemodynamic stability  - Assess quality of pulses, skin color and temperature  - Assess for signs of decreased coronary artery perfusion  - Instruct patient to report change in severity of symptoms  Outcome: Progressing  Goal: Absence of cardiac dysrhythmias or at baseline rhythm  Description: INTERVENTIONS:  - Continuous cardiac monitoring, vital signs, obtain 12 lead EKG if ordered  - Administer antiarrhythmic and heart rate control medications as ordered  - Monitor electrolytes and administer replacement therapy as ordered  Outcome: Progressing     Problem: METABOLIC, FLUID AND ELECTROLYTES - ADULT  Goal: Electrolytes maintained within normal limits  Description: INTERVENTIONS:  - Monitor labs and assess patient for signs and symptoms of electrolyte imbalances  - Administer electrolyte replacement as ordered  - Monitor response to electrolyte replacements, including repeat lab results as appropriate  - Instruct patient on fluid and nutrition as appropriate  Outcome: Progressing  Goal: Fluid balance maintained  Description: INTERVENTIONS:  - Monitor labs   - Monitor I/O and WT  - Instruct patient on fluid and nutrition as  appropriate  - Assess for signs & symptoms of volume excess or deficit  Outcome: Progressing  Goal: Glucose maintained within target range  Description: INTERVENTIONS:  - Monitor Blood Glucose as ordered  - Assess for signs and symptoms of hyperglycemia and hypoglycemia  - Administer ordered medications to maintain glucose within target range  - Assess nutritional intake and initiate nutrition service referral as needed  Outcome: Progressing

## 2024-03-31 NOTE — DISCHARGE SUMMARY
"[facility]  Discharge- Marie Hankinsnor 1950, 73 y.o. female MRN: [id number]  Unit/Bed#: [facility] Encounter: [id number]  Primary Care Provider: Lucia Posada DO   Date and time admitted to hospital: 3/29/2024 10:41 AM    * Antihistamines overdose  Assessment & Plan  72yo lady with underlying history of depression/insomnia presenting with fall [patient reportedly took 6 tablets of 50 mg Benadryl OTC to assist with her sleep last night].  She was attempting to get out of bed and fell down.  Patient was brought in secondary to fall.    Per patient and further dw daughter \"Magy\" -> patient has had significant depression since her sister passed away on 3/2. She was her sisters caregiver. She also said her mother is very dependent on her paxil.    CT head negative for acute findings  CT L-spine notes Chronic L5 pars defects with grade 1 L5 on S1 anterolisthesis noted. 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  CBC/CMP/ethanol/salicylate/Tylenol level within normal limits  Appreciate toxicology input.  For now continue with supportive care. No anticholinergic symptoms; hypertension, tachycardia, elevated temperature noted .  Refer to toxicology note for details.  Psychiatry consulted on 3/29 and 3/30. They recommended taking patient off suicidal precautions. Likely accidental OD.  Resumed paxil  Dw patient and daughter Magy -> hold off on using OTC sleeping aids. Use non medical modalities. Fu with pcp in 1 week    Depressive disorder  Assessment & Plan  Typically on Paxil, was on hold initially given patient drowsy  Resumed paxil     Mixed hyperlipidemia  Assessment & Plan  Resume statin    Discharging Physician / Practitioner: Sharon Florence MD  PCP: Lucia Posada DO  Admission Date:   Admission Orders (From " "admission, onward)       Ordered        03/30/24 1733  INPATIENT ADMISSION  Once            03/29/24 1327  Place in Observation  Once                          Discharge Date: 03/31/24    Disposition:      Home with family    Reason for Admission: benadryl OD    Discharge Diagnoses:     Please see assessment and plan section above for further details regarding discharge diagnoses.     Medical Problems       Resolved Problems  Date Reviewed: 5/1/2023   None         Consultations During Hospital Stay:  psychiatry    Medication Adjustments and Discharge Medications:  Summary of Medication Adjustments made as a result of this hospitalization: see med rec  Medication Dosing Tapers - Please refer to Discharge Medication List for details on any medication dosing tapers (if applicable to patient).  Medications being temporarily held (include recommended restart time): see med rec  Discharge Medication List: See after visit summary for reconciled discharge medications.       Diet Recommendations at Discharge:  Diet -        Diet Orders   (From admission, onward)                 Start     Ordered    03/30/24 1552  Diet Regular; Regular House  Diet effective now        References:    Adult Nutrition Support Algorithm    RD Therapeutic Diet Order Protocol   Question Answer Comment   Diet Type Regular    Regular Regular House    RD to adjust diet per protocol? Yes        03/30/24 1551                      Hospital Course:     Marie Pope is a 73 y.o. female patient who originally presented to the hospital on 3/29/2024 with underlying history of depression/insomnia presenting with fall [patient reportedly took 6 tablets of 50 mg Benadryl OTC to assist with her sleep last night].  She was attempting to get out of bed and fell down.  Patient was brought in secondary to fall.    Per patient and further dw daughter \"Magy\" -> patient has had significant depression since her sister passed away on 3/2. She was her sisters " "caregiver. She also said her mother is very dependent on her paxil.    CT head negative for acute findings  CT L-spine notes Chronic L5 pars defects with grade 1 L5 on S1 anterolisthesis noted. 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  CBC/CMP/ethanol/salicylate/Tylenol level within normal limits    Appreciate toxicology input.  For now continue with supportive care. No anticholinergic symptoms; hypertension, tachycardia, elevated temperature noted .  Refer to toxicology note for details. Psychiatry consulted on 3/29 and 3/30. They recommended taking patient off suicidal precautions. Likely accidental OD.no inpatient treatment needed.Resumed paxil.Dw patient and daughter Magy -> hold off on using OTC sleeping aids. Use non medical modalities. Fu with pcp in 1 week\  Both patient and daughter verbalized understanding and are in agreement.  DC home.    Condition at Discharge: fair     Discharge Day Visit / Exam:     Vitals: Blood Pressure: 148/70 (03/31/24 0500)  Pulse: 65 (03/31/24 0500)  Temperature: 98.6 °F (37 °C) (03/30/24 2018)  Temp Source: Oral (03/30/24 2018)  Respirations: 16 (03/31/24 0500)  Height: 5' 7.99\" (172.7 cm) (03/29/24 1700)  Weight - Scale: 76.6 kg (168 lb 14 oz) (03/30/24 0600)  SpO2: 92 % (03/31/24 0500)  Exam:   Physical Exam  Constitutional:       General: She is not in acute distress.     Appearance: She is normal weight. She is not toxic-appearing.   HENT:      Mouth/Throat:      Mouth: Mucous membranes are moist.      Pharynx: Oropharynx is clear.   Cardiovascular:      Rate and Rhythm: Normal rate and regular rhythm.      Pulses: Normal pulses.   Pulmonary:      Effort: Pulmonary effort is normal. No respiratory distress.      Breath sounds: Normal breath sounds.   Abdominal:      General: Abdomen is flat. Bowel sounds are normal. There is " no distension.      Palpations: Abdomen is soft.      Tenderness: There is no abdominal tenderness.   Musculoskeletal:      Right lower leg: No edema.      Left lower leg: No edema.   Neurological:      General: No focal deficit present.      Mental Status: She is alert and oriented to person, place, and time.         Discussion with Family: daughter evelia    Goals of Care Discussions:  Code Status at Discharge: Level 1 - Full Code      Discharge instructions/Information to patient and family:   See after visit summary section titled Discharge Instructions for information provided to patient and family.       Discharge Statement:  I spent 40 minutes discharging the patient. This time was spent on the day of discharge. I had direct contact with the patient on the day of discharge. Greater than 50% of the total time was spent examining patient, answering all patient questions, arranging and discussing plan of care with patient as well as directly providing post-discharge instructions.  Additional time then spent on discharge activities.    ** Please Note: This note has been constructed using a voice recognition system **

## 2024-03-31 NOTE — ASSESSMENT & PLAN NOTE
"74yo lady with underlying history of depression/insomnia presenting with fall [patient reportedly took 6 tablets of 50 mg Benadryl OTC to assist with her sleep last night].  She was attempting to get out of bed and fell down.  Patient was brought in secondary to fall.    Per patient and further dw daughter \"Magy\" -> patient has had significant depression since her sister passed away on 3/2. She was her sisters caregiver. She also said her mother is very dependent on her paxil.    CT head negative for acute findings  CT L-spine notes Chronic L5 pars defects with grade 1 L5 on S1 anterolisthesis noted. 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  CBC/CMP/ethanol/salicylate/Tylenol level within normal limits  Appreciate toxicology input.  For now continue with supportive care. No anticholinergic symptoms; hypertension, tachycardia, elevated temperature noted .  Refer to toxicology note for details.  Psychiatry consulted on 3/29 and 3/30. They recommended taking patient off suicidal precautions. Likely accidental OD.  Resumed paxil  Dw patient and daughter Magy -> hold off on using OTC sleeping aids. Use non medical modalities. Fu with pcp in 1 week  "

## 2024-04-01 ENCOUNTER — TRANSITIONAL CARE MANAGEMENT (OUTPATIENT)
Age: 74
End: 2024-04-01

## 2024-04-01 NOTE — UTILIZATION REVIEW
NOTIFICATION OF ADMISSION DISCHARGE   This is a Notification of Discharge from [facility]. Please be advised that this patient has been discharge from our facility. Below you will find the admission and discharge date and time including the patient’s disposition.   UTILIZATION REVIEW CONTACT:  Valorie White  Utilization   Network Utilization Review Department  Phone: [phone] x carefully listen to the prompts. All voicemails are confidential.  Email: [email or web address]     ADMISSION INFORMATION  PRESENTATION DATE: 3/29/2024 10:41 AM    INPATIENT ADMISSION DATE: 3/30/24  5:33 PM   DISCHARGE DATE: 3/31/2024 11:31 AM   DISPOSITION:Home/Self Care    Network Utilization Review Department  ATTENTION: Please call with any questions or concerns to [phone] and carefully listen to the prompts so that you are directed to the right person. All voicemails are confidential.   For Discharge needs, contact Care Management DC Support Team at [phone] opt. 2  Send all requests for admission clinical reviews, approved or denied determinations and any other requests to dedicated fax number below belonging to the campus where the patient is receiving treatment. List of dedicated fax numbers for the Facilities:  FACILITY NAME UR FAX NUMBER   ADMISSION DENIALS (Administrative/Medical Necessity) [phone]   DISCHARGE SUPPORT TEAM ([facility]) [phone]   PARENT CHILD HEALTH (Maternity/NICU/Pediatrics) [phone]   [facility] [phone]   [facility] [phone]   [facility] [phone]   [facility] [phone]   [facility] [phone]   [facility] [phone]   [facility] [phone]   [facility] [phone]   [facility]  "[facility] [phone]   [facility] [phone]   [facility] [phone]   [facility] [phone]           Sharon Florence MD  Physician  Hospitalist     Discharge Summary     Signed     Date of Service: 3/31/2024 11:31 AM     Signed         [facility]  Discharge- Marie Cavazosoconnor 1950, 73 y.o. female MRN: [id number]  Unit/Bed#: -01 Encounter: [id number]  Primary Care Provider: Lucia Posada DO   Date and time admitted to hospital: 3/29/2024 10:41 AM     * Antihistamines overdose  Assessment & Plan  74yo lady with underlying history of depression/insomnia presenting with fall [patient reportedly took 6 tablets of 50 mg Benadryl OTC to assist with her sleep last night].  She was attempting to get out of bed and fell down.  Patient was brought in secondary to fall.     Per patient and further dw daughter \"Magy\" -> patient has had significant depression since her sister passed away on 3/2. She was her sisters caregiver. She also said her mother is very dependent on her paxil.     CT head negative for acute findings  CT L-spine notes Chronic L5 pars defects with grade 1 L5 on S1 anterolisthesis noted. 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  CBC/CMP/ethanol/salicylate/Tylenol level within normal limits  Appreciate toxicology input.  For now continue with supportive care. No anticholinergic symptoms; hypertension, tachycardia, elevated temperature noted .  Refer to toxicology note for details.  Psychiatry consulted on 3/29 and 3/30. They recommended taking patient off suicidal precautions. Likely accidental OD.  Resumed paxil  Dw patient and daughter Magy -> hold off on using OTC sleeping aids. Use non medical " modalities. Fu with pcp in 1 week     Depressive disorder  Assessment & Plan  Typically on Paxil, was on hold initially given patient drowsy  Resumed paxil      Mixed hyperlipidemia  Assessment & Plan  Resume statin     Discharging Physician / Practitioner: Sharon Florence MD  PCP: Lucia Posada DO  Admission Date:   Admission Orders (From admission, onward)          Ordered         03/30/24 1733   INPATIENT ADMISSION  Once             03/29/24 1327   Place in Observation  Once                               Discharge Date: 03/31/24     Disposition:       Home with family     Reason for Admission: benadryl OD     Discharge Diagnoses:      Please see assessment and plan section above for further details regarding discharge diagnoses.      Medical Problems         Resolved Problems  Date Reviewed: 5/1/2023   None            Consultations During Hospital Stay:  psychiatry     Medication Adjustments and Discharge Medications:  Summary of Medication Adjustments made as a result of this hospitalization: see med rec  Medication Dosing Tapers - Please refer to Discharge Medication List for details on any medication dosing tapers (if applicable to patient).  Medications being temporarily held (include recommended restart time): see med rec  Discharge Medication List: See after visit summary for reconciled discharge medications.         Diet Recommendations at Discharge:  Diet -          Diet Orders   (From admission, onward)                       Start     Ordered     03/30/24 1552   Diet Regular; Regular House  Diet effective now        References:    Adult Nutrition Support Algorithm    RD Therapeutic Diet Order Protocol   Question Answer Comment   Diet Type Regular     Regular Regular House     RD to adjust diet per protocol? Yes         03/30/24 1551                             Hospital Course:      Marie Pope is a 73 y.o. female patient who originally presented to the hospital on 3/29/2024 with  "underlying history of depression/insomnia presenting with fall [patient reportedly took 6 tablets of 50 mg Benadryl OTC to assist with her sleep last night].  She was attempting to get out of bed and fell down.  Patient was brought in secondary to fall.     Per patient and further dw daughter \"Magy\" -> patient has had significant depression since her sister passed away on 3/2. She was her sisters caregiver. She also said her mother is very dependent on her paxil.     CT head negative for acute findings  CT L-spine notes Chronic L5 pars defects with grade 1 L5 on S1 anterolisthesis noted. 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  CBC/CMP/ethanol/salicylate/Tylenol level within normal limits     Appreciate toxicology input.  For now continue with supportive care. No anticholinergic symptoms; hypertension, tachycardia, elevated temperature noted .  Refer to toxicology note for details. Psychiatry consulted on 3/29 and 3/30. They recommended taking patient off suicidal precautions. Likely accidental OD.no inpatient treatment needed.Resumed paxil.Dw patient and daughter Magy -> hold off on using OTC sleeping aids. Use non medical modalities. Fu with pcp in 1 week\  Both patient and daughter verbalized understanding and are in agreement.  DC home.     Condition at Discharge: fair      Discharge Day Visit / Exam:      Vitals: Blood Pressure: 148/70 (03/31/24 0500)  Pulse: 65 (03/31/24 0500)  Temperature: 98.6 °F (37 °C) (03/30/24 2018)  Temp Source: Oral (03/30/24 2018)  Respirations: 16 (03/31/24 0500)  Height: 5' 7.99\" (172.7 cm) (03/29/24 1700)  Weight - Scale: 76.6 kg (168 lb 14 oz) (03/30/24 0600)  SpO2: 92 % (03/31/24 0500)  Exam:   Physical Exam  Constitutional:       General: She is not in acute distress.     Appearance: She is normal weight. She is not " toxic-appearing.   HENT:      Mouth/Throat:      Mouth: Mucous membranes are moist.      Pharynx: Oropharynx is clear.   Cardiovascular:      Rate and Rhythm: Normal rate and regular rhythm.      Pulses: Normal pulses.   Pulmonary:      Effort: Pulmonary effort is normal. No respiratory distress.      Breath sounds: Normal breath sounds.   Abdominal:      General: Abdomen is flat. Bowel sounds are normal. There is no distension.      Palpations: Abdomen is soft.      Tenderness: There is no abdominal tenderness.   Musculoskeletal:      Right lower leg: No edema.      Left lower leg: No edema.   Neurological:      General: No focal deficit present.      Mental Status: She is alert and oriented to person, place, and time.            Discussion with Family: daughter evelia     Goals of Care Discussions:  Code Status at Discharge: Level 1 - Full Code        Discharge instructions/Information to patient and family:   See after visit summary section titled Discharge Instructions for information provided to patient and family.       Discharge Statement:  I spent 40 minutes discharging the patient. This time was spent on the day of discharge. I had direct contact with the patient on the day of discharge. Greater than 50% of the total time was spent examining patient, answering all patient questions, arranging and discussing plan of care with patient as well as directly providing post-discharge instructions.  Additional time then spent on discharge activities.     ** Please Note: This note has been constructed using a voice recognition system **

## 2024-04-04 ENCOUNTER — OFFICE VISIT (OUTPATIENT)
Age: 74
End: 2024-04-04
Payer: COMMERCIAL

## 2024-04-04 VITALS
SYSTOLIC BLOOD PRESSURE: 130 MMHG | OXYGEN SATURATION: 96 % | WEIGHT: 169 LBS | BODY MASS INDEX: 25.7 KG/M2 | HEART RATE: 70 BPM | DIASTOLIC BLOOD PRESSURE: 70 MMHG | TEMPERATURE: 97.5 F | RESPIRATION RATE: 18 BRPM

## 2024-04-04 DIAGNOSIS — F99 INSOMNIA DUE TO OTHER MENTAL DISORDER: ICD-10-CM

## 2024-04-04 DIAGNOSIS — F51.05 INSOMNIA DUE TO OTHER MENTAL DISORDER: ICD-10-CM

## 2024-04-04 DIAGNOSIS — T45.0X1D: Primary | ICD-10-CM

## 2024-04-04 DIAGNOSIS — F32.A DEPRESSIVE DISORDER: ICD-10-CM

## 2024-04-04 PROCEDURE — 99495 TRANSJ CARE MGMT MOD F2F 14D: CPT | Performed by: FAMILY MEDICINE

## 2024-04-04 RX ORDER — TEMAZEPAM 15 MG/1
15 CAPSULE ORAL
Qty: 30 CAPSULE | Refills: 0 | Status: SHIPPED | OUTPATIENT
Start: 2024-04-04

## 2024-04-04 RX ORDER — PAROXETINE 30 MG/1
30 TABLET, FILM COATED ORAL EVERY MORNING
Qty: 90 TABLET | Refills: 0 | Status: SHIPPED | OUTPATIENT
Start: 2024-04-04

## 2024-04-04 NOTE — PROGRESS NOTES
Assessment & Plan     1. Poisoning by antihistamine, accidental or unintentional, subsequent encounter-resolved.  Patient encouraged to avoid OTC sleep aids    2. Depressive disorder-active.  Worsened by recent grief due to the loss of her sister.  Will increase SSRI.  Patient strongly recommended to consider grief counseling  -     PARoxetine (PAXIL) 30 mg tablet; Take 1 tablet (30 mg total) by mouth every morning    3. Insomnia due to other mental disorder-worsened by depression and anxiety related to the recent loss of her sister.  Symptoms worsen at night.  Was on Ambien but has been out since March.  I would not recommend restarting Ambien at this time.  Will switch to temazepam to help with sleep and nighttime anxiety  -     temazepam (RESTORIL) 15 mg capsule; Take 1 capsule (15 mg total) by mouth daily at bedtime as needed for sleep         Subjective     Transitional Care Management Review:   Marie Pope is a 73 y.o. female here for TCM follow up.     During the TCM phone call patient stated:  TCM Call       Date and time call was made  4/2/2024 12:18 PM    Hospital care reviewed  Records reviewed    Patient was hospitialized at  [facility]    Date of Admission  03/29/24    Date of discharge  03/31/24    Diagnosis  Antihistamines overdose    Disposition  Home    Were the patients medications reviewed and updated  Yes    Current Symptoms  None          TCM Call       Post hospital issues  None    Should patient be enrolled in anticoag monitoring?  No    Scheduled for follow up?  Yes    Did you obtain your prescribed medications  Yes    Do you need help managing your prescriptions or medications  No    Is transportation to your appointment needed  No    I have advised the patient to call PCP with any new or worsening symptoms  [facility]    Living Arrangements  Alone    Are you recieving any outpatient services  No    Are you recieving home care services  No    Are you using any  community resources  No    Counseling  Patient    Counseling topics  Activities of daily living; Importance of RX compliance          Pt presents for post hospital f/u  Discussed mood. Denies SI or intention of suicides. Has been out of abien and since hasn't been able to sleep well. Her goal of the nightquil and benadryl use was sleep. Judgement was worsened by grief pt admits to this. Loss her sister on 3/2/2024      Review of Systems   Constitutional:  Negative for fever.   Psychiatric/Behavioral:  Positive for dysphoric mood and sleep disturbance. Negative for suicidal ideas. The patient is nervous/anxious.        Objective     /70 (BP Location: Left arm, Patient Position: Sitting, Cuff Size: Standard)   Pulse 70   Temp 97.5 °F (36.4 °C) (Tympanic)   Resp 18   Wt 76.7 kg (169 lb)   SpO2 96%   BMI 25.70 kg/m²      Physical Exam  HENT:      Head: Normocephalic and atraumatic.   Cardiovascular:      Rate and Rhythm: Normal rate and regular rhythm.      Heart sounds: No murmur heard.  Pulmonary:      Effort: Pulmonary effort is normal.      Breath sounds: Normal breath sounds.   Abdominal:      General: Bowel sounds are normal.   Neurological:      Mental Status: She is alert and oriented to person, place, and time.   Psychiatric:         Attention and Perception: Attention normal.         Mood and Affect: Mood is depressed. Affect is tearful.         Speech: Speech normal.         Behavior: Behavior is cooperative.         Thought Content: Thought content does not include suicidal ideation. Thought content does not include suicidal plan.       Medications have been reviewed by provider in current encounter    Lucia Posada DO

## 2024-05-04 DIAGNOSIS — F51.05 INSOMNIA DUE TO OTHER MENTAL DISORDER: ICD-10-CM

## 2024-05-04 DIAGNOSIS — E78.00 HIGH CHOLESTEROL: ICD-10-CM

## 2024-05-04 DIAGNOSIS — F99 INSOMNIA DUE TO OTHER MENTAL DISORDER: ICD-10-CM

## 2024-05-04 RX ORDER — ATORVASTATIN CALCIUM 40 MG/1
40 TABLET, FILM COATED ORAL
Qty: 90 TABLET | Refills: 0 | OUTPATIENT
Start: 2024-05-04

## 2024-05-04 NOTE — TELEPHONE ENCOUNTER
Medication Refill Request     Name Ambien 12.5mg   Dose/Frequency  Take 1 tablet by mouth,   Quantity 30  Verified pharmacy   [x]  Verified ordering Provider   [x]  Does patient have enough for the next 3 days? Yes [] No [x]

## 2024-05-06 DIAGNOSIS — E78.00 HIGH CHOLESTEROL: ICD-10-CM

## 2024-05-06 RX ORDER — TEMAZEPAM 15 MG/1
15 CAPSULE ORAL
Qty: 30 CAPSULE | Refills: 0 | Status: SHIPPED | OUTPATIENT
Start: 2024-05-06

## 2024-05-06 RX ORDER — ZOLPIDEM TARTRATE 12.5 MG/1
12.5 TABLET, FILM COATED, EXTENDED RELEASE ORAL
Qty: 30 TABLET | Refills: 0 | OUTPATIENT
Start: 2024-05-06

## 2024-05-06 NOTE — TELEPHONE ENCOUNTER
Medication: Atorvastatin    Dose/Frequency: 40mg daily    Quantity: 90 Refills 0    Pharmacy: [facility]    Office:   [x] PCP/Provider -   [] Speciality/Provider -     Does the patient have enough for 3 days?   [x] Yes   [] No - Send as HP to POD

## 2024-05-07 RX ORDER — ATORVASTATIN CALCIUM 40 MG/1
40 TABLET, FILM COATED ORAL
Qty: 90 TABLET | Refills: 0 | Status: SHIPPED | OUTPATIENT
Start: 2024-05-07

## 2024-05-29 ENCOUNTER — TELEPHONE (OUTPATIENT)
Age: 74
End: 2024-05-29

## 2024-05-29 DIAGNOSIS — F51.05 INSOMNIA DUE TO OTHER MENTAL DISORDER: Primary | ICD-10-CM

## 2024-05-29 DIAGNOSIS — F99 INSOMNIA DUE TO OTHER MENTAL DISORDER: Primary | ICD-10-CM

## 2024-05-29 NOTE — TELEPHONE ENCOUNTER
Patient called in stating she would like a script changed. She states her temazepam (RESTORIL) 15 mg capsule. Patient has not been able to sleep long and when she wakes up she's very drowsy. She states she also hears music in her ears. Patient would like script changed back to zolpidem (AMBIEN) 10 mg tablet.

## 2024-05-31 RX ORDER — RAMELTEON 8 MG/1
8 TABLET ORAL
Qty: 14 TABLET | Refills: 0 | Status: SHIPPED | OUTPATIENT
Start: 2024-05-31 | End: 2024-06-14

## 2024-05-31 NOTE — TELEPHONE ENCOUNTER
The medication Ramelteon  has been sent to pharmacy. Use at bed time. If it helps I will send a larger quantity

## 2024-06-13 ENCOUNTER — TELEPHONE (OUTPATIENT)
Age: 74
End: 2024-06-13

## 2024-06-14 DIAGNOSIS — F99 INSOMNIA DUE TO OTHER MENTAL DISORDER: ICD-10-CM

## 2024-06-14 DIAGNOSIS — F51.05 INSOMNIA DUE TO OTHER MENTAL DISORDER: ICD-10-CM

## 2024-06-14 RX ORDER — RAMELTEON 8 MG/1
8 TABLET ORAL
Qty: 30 TABLET | Refills: 0 | Status: SHIPPED | OUTPATIENT
Start: 2024-06-14 | End: 2024-06-28

## 2024-06-27 ENCOUNTER — TELEPHONE (OUTPATIENT)
Age: 74
End: 2024-06-27

## 2024-06-27 NOTE — TELEPHONE ENCOUNTER
Patient called states      ramelteon (ROZEREM) 8 mg tablet Take 1 tablet (8 mg total) by mouth daily at bedtime for 14 days     Has failed, it does not help with sleeping at all. She doesn't want to continue to take it.      She is requesting new script for Piperien.    Please advise. Thank you

## 2024-06-30 DIAGNOSIS — F32.A DEPRESSIVE DISORDER: ICD-10-CM

## 2024-06-30 RX ORDER — PAROXETINE 30 MG/1
TABLET, FILM COATED ORAL
Qty: 90 TABLET | Refills: 1 | Status: SHIPPED | OUTPATIENT
Start: 2024-06-30

## 2024-07-03 ENCOUNTER — TELEPHONE (OUTPATIENT)
Age: 74
End: 2024-07-03

## 2024-07-03 NOTE — TELEPHONE ENCOUNTER
Patient called for update, I did inform her that the message was sent to Dr. Posada. She is requesting a call from Dr. Posada as soon as possible.

## 2024-07-03 NOTE — TELEPHONE ENCOUNTER
Pt calling in requesting sleep meds she does not know what the one she is currently but she states it does not work she would like ambien please.    Please advise

## 2024-07-05 NOTE — TELEPHONE ENCOUNTER
Pt called, she states she is not sleeping at all, has not received a call and wondering if there is any updates on this. Please advise and give pt a call back at your convenience.

## 2024-07-08 ENCOUNTER — TELEPHONE (OUTPATIENT)
Age: 74
End: 2024-07-08

## 2024-07-23 ENCOUNTER — OFFICE VISIT (OUTPATIENT)
Age: 74
End: 2024-07-23
Payer: COMMERCIAL

## 2024-07-23 VITALS
SYSTOLIC BLOOD PRESSURE: 130 MMHG | HEART RATE: 62 BPM | WEIGHT: 166.4 LBS | TEMPERATURE: 97.5 F | OXYGEN SATURATION: 98 % | DIASTOLIC BLOOD PRESSURE: 70 MMHG | BODY MASS INDEX: 25.22 KG/M2 | HEIGHT: 68 IN | RESPIRATION RATE: 18 BRPM

## 2024-07-23 DIAGNOSIS — F99 INSOMNIA DUE TO OTHER MENTAL DISORDER: ICD-10-CM

## 2024-07-23 DIAGNOSIS — E78.2 MIXED HYPERLIPIDEMIA: ICD-10-CM

## 2024-07-23 DIAGNOSIS — R73.03 PREDIABETES: ICD-10-CM

## 2024-07-23 DIAGNOSIS — Z00.00 MEDICARE ANNUAL WELLNESS VISIT, SUBSEQUENT: Primary | ICD-10-CM

## 2024-07-23 DIAGNOSIS — F32.A DEPRESSIVE DISORDER: ICD-10-CM

## 2024-07-23 DIAGNOSIS — F51.05 INSOMNIA DUE TO OTHER MENTAL DISORDER: ICD-10-CM

## 2024-07-23 PROBLEM — R73.9 HYPERGLYCEMIA: Status: RESOLVED | Noted: 2020-07-13 | Resolved: 2024-07-23

## 2024-07-23 PROCEDURE — G0439 PPPS, SUBSEQ VISIT: HCPCS | Performed by: FAMILY MEDICINE

## 2024-07-23 PROCEDURE — 99214 OFFICE O/P EST MOD 30 MIN: CPT | Performed by: FAMILY MEDICINE

## 2024-07-23 RX ORDER — ATORVASTATIN CALCIUM 40 MG/1
40 TABLET, FILM COATED ORAL
Qty: 90 TABLET | Refills: 1 | Status: SHIPPED | OUTPATIENT
Start: 2024-07-23

## 2024-07-23 RX ORDER — TEMAZEPAM 15 MG/1
15 CAPSULE ORAL
Qty: 30 CAPSULE | Refills: 1 | Status: SHIPPED | OUTPATIENT
Start: 2024-07-23 | End: 2024-08-16 | Stop reason: SDUPTHER

## 2024-07-23 NOTE — PATIENT INSTRUCTIONS
Medicare Preventive Visit Patient Instructions  Thank you for completing your Welcome to Medicare Visit or Medicare Annual Wellness Visit today. Your next wellness visit will be due in one year (7/24/2025).  The screening/preventive services that you may require over the next 5-10 years are detailed below. Some tests may not apply to you based off risk factors and/or age. Screening tests ordered at today's visit but not completed yet may show as past due. Also, please note that scanned in results may not display below.  Preventive Screenings:  Service Recommendations Previous Testing/Comments   Colorectal Cancer Screening  * Colonoscopy    * Fecal Occult Blood Test (FOBT)/Fecal Immunochemical Test (FIT)  * Fecal DNA/Cologuard Test  * Flexible Sigmoidoscopy Age: 45-75 years old   Colonoscopy: every 10 years (may be performed more frequently if at higher risk)  OR  FOBT/FIT: every 1 year  OR  Cologuard: every 3 years  OR  Sigmoidoscopy: every 5 years  Screening may be recommended earlier than age 45 if at higher risk for colorectal cancer. Also, an individualized decision between you and your healthcare provider will decide whether screening between the ages of 76-85 would be appropriate. Colonoscopy: Not on file  FOBT/FIT: Not on file  Cologuard: Not on file  Sigmoidoscopy: Not on file          Breast Cancer Screening Age: 40+ years old  Frequency: every 1-2 years  Not required if history of left and right mastectomy Mammogram: Not on file    Screening Not Indicated   Cervical Cancer Screening Between the ages of 21-29, pap smear recommended once every 3 years.   Between the ages of 30-65, can perform pap smear with HPV co-testing every 5 years.   Recommendations may differ for women with a history of total hysterectomy, cervical cancer, or abnormal pap smears in past. Pap Smear: Not on file    Screening Not Indicated   Hepatitis C Screening Once for adults born between 1945 and 1965  More frequently in patients at  high risk for Hepatitis C Hep C Antibody: Not on file    Risks and Benefits Discussed  Due for Hepatitis C screening   Diabetes Screening 1-2 times per year if you're at risk for diabetes or have pre-diabetes Fasting glucose: 120 mg/dL (3/30/2024)  A1C: 6.1 % (11/15/2022)  Screening Current   Cholesterol Screening Once every 5 years if you don't have a lipid disorder. May order more often based on risk factors. Lipid panel: 07/26/2022    Screening Not Indicated  History Lipid Disorder     Other Preventive Screenings Covered by Medicare:  Abdominal Aortic Aneurysm (AAA) Screening: covered once if your at risk. You're considered to be at risk if you have a family history of AAA.  Lung Cancer Screening: covers low dose CT scan once per year if you meet all of the following conditions: (1) Age 55-77; (2) No signs or symptoms of lung cancer; (3) Current smoker or have quit smoking within the last 15 years; (4) You have a tobacco smoking history of at least 20 pack years (packs per day multiplied by number of years you smoked); (5) You get a written order from a healthcare provider.  Glaucoma Screening: covered annually if you're considered high risk: (1) You have diabetes OR (2) Family history of glaucoma OR (3)  aged 50 and older OR (4)  American aged 65 and older  Osteoporosis Screening: covered every 2 years if you meet one of the following conditions: (1) You're estrogen deficient and at risk for osteoporosis based off medical history and other findings; (2) Have a vertebral abnormality; (3) On glucocorticoid therapy for more than 3 months; (4) Have primary hyperparathyroidism; (5) On osteoporosis medications and need to assess response to drug therapy.   Last bone density test (DXA Scan): Not on file.  HIV Screening: covered annually if you're between the age of 15-65. Also covered annually if you are younger than 15 and older than 65 with risk factors for HIV infection. For pregnant  patients, it is covered up to 3 times per pregnancy.    Immunizations:  Immunization Recommendations   Influenza Vaccine Annual influenza vaccination during flu season is recommended for all persons aged >= 6 months who do not have contraindications   Pneumococcal Vaccine   * Pneumococcal conjugate vaccine = PCV13 (Prevnar 13), PCV15 (Vaxneuvance), PCV20 (Prevnar 20)  * Pneumococcal polysaccharide vaccine = PPSV23 (Pneumovax) Adults 19-65 yo with certain risk factors or if 65+ yo  If never received any pneumonia vaccine: recommend Prevnar 20 (PCV20)  Give PCV20 if previously received 1 dose of PCV13 or PPSV23   Hepatitis B Vaccine 3 dose series if at intermediate or high risk (ex: diabetes, end stage renal disease, liver disease)   Respiratory syncytial virus (RSV) Vaccine - COVERED BY MEDICARE PART D  * RSVPreF3 (Arexvy) CDC recommends that adults 60 years of age and older may receive a single dose of RSV vaccine using shared clinical decision-making (SCDM)   Tetanus (Td) Vaccine - COST NOT COVERED BY MEDICARE PART B Following completion of primary series, a booster dose should be given every 10 years to maintain immunity against tetanus. Td may also be given as tetanus wound prophylaxis.   Tdap Vaccine - COST NOT COVERED BY MEDICARE PART B Recommended at least once for all adults. For pregnant patients, recommended with each pregnancy.   Shingles Vaccine (Shingrix) - COST NOT COVERED BY MEDICARE PART B  2 shot series recommended in those 19 years and older who have or will have weakened immune systems or those 50 years and older     Health Maintenance Due:      Topic Date Due   • Hepatitis C Screening  Never done   • Breast Cancer Screening: Mammogram  Never done   • Colorectal Cancer Screening  Never done     Immunizations Due:      Topic Date Due   • Hepatitis A Vaccine (1 of 2 - Risk 2-dose series) Never done   • Pneumococcal Vaccine: 65+ Years (1 of 1 - PCV) Never done   • COVID-19 Vaccine (1 - 2023-24 season)  Never done   • Influenza Vaccine (1) 09/01/2024     Advance Directives   What are advance directives?  Advance directives are legal documents that state your wishes and plans for medical care. These plans are made ahead of time in case you lose your ability to make decisions for yourself. Advance directives can apply to any medical decision, such as the treatments you want, and if you want to donate organs.   What are the types of advance directives?  There are many types of advance directives, and each state has rules about how to use them. You may choose a combination of any of the following:  Living will:  This is a written record of the treatment you want. You can also choose which treatments you do not want, which to limit, and which to stop at a certain time. This includes surgery, medicine, IV fluid, and tube feedings.   Durable power of  for healthcare (DPAHC):  This is a written record that states who you want to make healthcare choices for you when you are unable to make them for yourself. This person, called a proxy, is usually a family member or a friend. You may choose more than 1 proxy.  Do not resuscitate (DNR) order:  A DNR order is used in case your heart stops beating or you stop breathing. It is a request not to have certain forms of treatment, such as CPR. A DNR order may be included in other types of advance directives.  Medical directive:  This covers the care that you want if you are in a coma, near death, or unable to make decisions for yourself. You can list the treatments you want for each condition. Treatment may include pain medicine, surgery, blood transfusions, dialysis, IV or tube feedings, and a ventilator (breathing machine).  Values history:  This document has questions about your views, beliefs, and how you feel and think about life. This information can help others choose the care that you would choose.  Why are advance directives important?  An advance directive helps you  control your care. Although spoken wishes may be used, it is better to have your wishes written down. Spoken wishes can be misunderstood, or not followed. Treatments may be given even if you do not want them. An advance directive may make it easier for your family to make difficult choices about your care.   Weight Management   Why it is important to manage your weight:  Being overweight increases your risk of health conditions such as heart disease, high blood pressure, type 2 diabetes, and certain types of cancer. It can also increase your risk for osteoarthritis, sleep apnea, and other respiratory problems. Aim for a slow, steady weight loss. Even a small amount of weight loss can lower your risk of health problems.  How to lose weight safely:  A safe and healthy way to lose weight is to eat fewer calories and get regular exercise. You can lose up about 1 pound a week by decreasing the number of calories you eat by 500 calories each day.   Healthy meal plan for weight management:  A healthy meal plan includes a variety of foods, contains fewer calories, and helps you stay healthy. A healthy meal plan includes the following:  Eat whole-grain foods more often.  A healthy meal plan should contain fiber. Fiber is the part of grains, fruits, and vegetables that is not broken down by your body. Whole-grain foods are healthy and provide extra fiber in your diet. Some examples of whole-grain foods are whole-wheat breads and pastas, oatmeal, brown rice, and bulgur.  Eat a variety of vegetables every day.  Include dark, leafy greens such as spinach, kale, phong greens, and mustard greens. Eat yellow and orange vegetables such as carrots, sweet potatoes, and winter squash.   Eat a variety of fruits every day.  Choose fresh or canned fruit (canned in its own juice or light syrup) instead of juice. Fruit juice has very little or no fiber.  Eat low-fat dairy foods.  Drink fat-free (skim) milk or 1% milk. Eat fat-free yogurt  and low-fat cottage cheese. Try low-fat cheeses such as mozzarella and other reduced-fat cheeses.  Choose meat and other protein foods that are low in fat.  Choose beans or other legumes such as split peas or lentils. Choose fish, skinless poultry (chicken or turkey), or lean cuts of red meat (beef or pork). Before you cook meat or poultry, cut off any visible fat.   Use less fat and oil.  Try baking foods instead of frying them. Add less fat, such as margarine, sour cream, regular salad dressing and mayonnaise to foods. Eat fewer high-fat foods. Some examples of high-fat foods include french fries, doughnuts, ice cream, and cakes.  Eat fewer sweets.  Limit foods and drinks that are high in sugar. This includes candy, cookies, regular soda, and sweetened drinks.  Exercise:  Exercise at least 30 minutes per day on most days of the week. Some examples of exercise include walking, biking, dancing, and swimming. You can also fit in more physical activity by taking the stairs instead of the elevator or parking farther away from stores. Ask your healthcare provider about the best exercise plan for you.      © Copyright [employer] 2018 Information is for End User's use only and may not be sold, redistributed or otherwise used for commercial purposes. All illustrations and images included in CareNotes® are the copyrighted property of [employer] Inc[employer] or [employer]

## 2024-07-23 NOTE — PROGRESS NOTES
Ambulatory Visit  Name: Marie Pope      : 1950      MRN: [id number]  Encounter Provider: Lucia Posada DO  Encounter Date: 2024   Encounter department: [facility] [place]    Assessment & Plan   1. Medicare annual wellness visit, subsequent  2. Insomnia due to other mental disorder  -     temazepam (RESTORIL) 15 mg capsule; Take 1 capsule (15 mg total) by mouth daily at bedtime as needed for sleep  3. Depressive disorder- partial remission with paxil. Not amendable to theray. Denies hi/si  4. Mixed hyperlipidemia- controlled with statin   -     atorvastatin (LIPITOR) 40 mg tablet; Take 1 tablet (40 mg total) by mouth daily at bedtime  5. Prediabetes- diet controlled with an A1C of 6.1        Preventive health issues were discussed with patient, and age appropriate screening tests were ordered as noted in patient's After Visit Summary. Personalized health advice and appropriate referrals for health education or preventive services given if needed, as noted in patient's After Visit Summary.      Patient Care Team:  Lucia Posada DO as PCP - General (Family Medicine)  Lucia Posada DO as PCP - PCP-[facility] ([facility])    Review of Systems   Constitutional:  Positive for fatigue. Negative for fever.   Respiratory:  Negative for shortness of breath.    Cardiovascular:  Negative for chest pain.   Psychiatric/Behavioral:  Positive for dysphoric mood and sleep disturbance. Negative for suicidal ideas.      Medical History Reviewed by provider this encounter:  Tobacco  Allergies  Meds  Problems  Med Hx  Surg Hx  Fam Hx       Annual Wellness Visit Questionnaire   Marie is here for her Subsequent Wellness visit.     Health Risk Assessment:   Patient rates overall health as very good. Patient feels that their physical health rating is same. Patient is very satisfied with their life. Eyesight was rated as slightly worse. Hearing was rated as  slightly worse. Patient feels that their emotional and mental health rating is much better. Patients states they are sometimes angry. Patient states they are sometimes unusually tired/fatigued. Pain experienced in the last 7 days has been none. Patient states that she has experienced weight loss or gain in last 6 months.     Fall Risk Screening:   In the past year, patient has experienced: no history of falling in past year      Urinary Incontinence Screening:   Patient has not leaked urine accidently in the last six months.     Home Safety:  Patient does not have trouble with stairs inside or outside of their home. Patient has working smoke alarms and has no working carbon monoxide detector. Home safety hazards include: none.     Nutrition:   Current diet is Regular.     Medications:   Patient is currently taking over-the-counter supplements. OTC medications include: see medication list. Patient is able to manage medications. motrin    Activities of Daily Living (ADLs)/Instrumental Activities of Daily Living (IADLs):   Walk and transfer into and out of bed and chair?: Yes  Dress and groom yourself?: Yes    Bathe or shower yourself?: Yes    Feed yourself? Yes  Do your laundry/housekeeping?: Yes  Manage your money, pay your bills and track your expenses?: Yes  Make your own meals?: Yes    Do your own shopping?: Yes    Previous Hospitalizations:   Any hospitalizations or ED visits within the last 12 months?: No      Advance Care Planning:   Living will: No    Advanced directive counseling given: Yes    End of Life Decisions reviewed with patient: Yes      PREVENTIVE SCREENINGS      Cardiovascular Screening:    General: Screening Not Indicated and History Lipid Disorder      Diabetes Screening:     General: Screening Current      Breast Cancer Screening:     General: Screening Not Indicated      Cervical Cancer Screening:    General: Screening Not Indicated      Lung Cancer Screening:     General: Screening Not  "Indicated      Hepatitis C Screening:    General: Risks and Benefits Discussed    Hep C Screening Accepted: Yes      Screening, Brief Intervention, and Referral to Treatment (SBIRT)    Screening  Typical number of drinks in a day: 0  Typical number of drinks in a week: 0  Interpretation: Low risk drinking behavior.    Single Item Drug Screening:  How often have you used an illegal drug (including marijuana) or a prescription medication for non-medical reasons in the past year? never    Single Item Drug Screen Score: 0  Interpretation: Negative screen for possible drug use disorder    Social Determinants of Health     Financial Resource Strain: Low Risk  (10/18/2022)    Overall Financial Resource Strain (CARDIA)     Difficulty of Paying Living Expenses: Not hard at all   Food Insecurity: No Food Insecurity (7/23/2024)    Hunger Vital Sign     Worried About Running Out of Food in the Last Year: Never true     Ran Out of Food in the Last Year: Never true   Transportation Needs: No Transportation Needs (7/23/2024)    PRAPARE - Transportation     Lack of Transportation (Medical): No     Lack of Transportation (Non-Medical): No   Housing Stability: Low Risk  (7/23/2024)    Housing Stability Vital Sign     Unable to Pay for Housing in the Last Year: No     Number of Times Moved in the Last Year: 1     Homeless in the Last Year: No   Utilities: Not At Risk (7/23/2024)    [facility] Utilities     Threatened with loss of utilities: No     No results found.    Objective     /70 (BP Location: Left arm, Patient Position: Sitting, Cuff Size: Standard)   Pulse 62   Temp 97.5 °F (36.4 °C) (Tympanic)   Resp 18   Ht 5' 7.99\" (1.727 m)   Wt 75.5 kg (166 lb 6.4 oz)   SpO2 98%   BMI 25.31 kg/m²     Physical Exam  HENT:      Head: Normocephalic.      Right Ear: External ear normal.      Left Ear: External ear normal.      Mouth/Throat:      Mouth: Mucous membranes are moist.      Pharynx: Oropharynx is clear.   Eyes:      " Conjunctiva/sclera: Conjunctivae normal.   Cardiovascular:      Rate and Rhythm: Normal rate and regular rhythm.   Pulmonary:      Effort: Pulmonary effort is normal.      Breath sounds: Normal breath sounds.   Musculoskeletal:      Right lower leg: No edema.      Left lower leg: No edema.   Neurological:      Mental Status: She is alert and oriented to person, place, and time.   Psychiatric:         Attention and Perception: Attention normal.         Mood and Affect: Mood is depressed. Affect is tearful.         Behavior: Behavior is cooperative.         Thought Content: Thought content does not include suicidal ideation. Thought content does not include suicidal plan.

## 2024-08-16 DIAGNOSIS — F51.05 INSOMNIA DUE TO OTHER MENTAL DISORDER: ICD-10-CM

## 2024-08-16 DIAGNOSIS — F99 INSOMNIA DUE TO OTHER MENTAL DISORDER: ICD-10-CM

## 2024-08-16 NOTE — TELEPHONE ENCOUNTER
Medication: temazepam (RESTORIL) 15 mg capsule     Dose/Frequency: Take 1 capsule (15 mg total) by mouth daily at bedtime as needed for sleep     Quantity:  30 capsule     Pharmacy: [id number] - [place] - [address]     Office:   [x] PCP/Provider -  Lucia Posada, DO   [] Speciality/Provider -     Does the patient have enough for 3 days?   [] Yes   [x] No - Send as HP to POD      Patient says the doctor changed the directions of her script and she was advised to take two pills at night instead of one so now she will run out faster. Please advise

## 2024-08-19 RX ORDER — TEMAZEPAM 30 MG
30 CAPSULE ORAL
Qty: 30 CAPSULE | Refills: 1 | Status: SHIPPED | OUTPATIENT
Start: 2024-08-19 | End: 2024-09-18

## 2024-09-25 DIAGNOSIS — F51.05 INSOMNIA DUE TO OTHER MENTAL DISORDER: ICD-10-CM

## 2024-09-25 DIAGNOSIS — F99 INSOMNIA DUE TO OTHER MENTAL DISORDER: ICD-10-CM

## 2024-09-25 RX ORDER — TEMAZEPAM 30 MG
30 CAPSULE ORAL
Qty: 30 CAPSULE | Refills: 0 | Status: SHIPPED | OUTPATIENT
Start: 2024-09-25 | End: 2024-10-25

## 2024-09-25 NOTE — TELEPHONE ENCOUNTER
Reason for call:   [x] Refill   [] Prior Auth  [] Other:     Office:   [x] PCP/Provider - [facility]  [] Specialty/Provider -     Medication: temazepam (RESTORIL) 30 mg capsule     Dose/Frequency: 30 mg, Daily at bedtime PRN     Quantity: 30    Pharmacy: [facility] #[id number]    Does the patient have enough for 3 days?   [] Yes   [x] No - Send as HP to POD

## 2024-10-22 DIAGNOSIS — F51.05 INSOMNIA DUE TO OTHER MENTAL DISORDER: ICD-10-CM

## 2024-10-22 DIAGNOSIS — F99 INSOMNIA DUE TO OTHER MENTAL DISORDER: ICD-10-CM

## 2024-10-23 RX ORDER — TEMAZEPAM 30 MG/1
30 CAPSULE ORAL
Qty: 30 CAPSULE | Refills: 0 | Status: SHIPPED | OUTPATIENT
Start: 2024-10-23 | End: 2024-11-22

## 2024-11-12 ENCOUNTER — OFFICE VISIT (OUTPATIENT)
Age: 74
End: 2024-11-12
Payer: COMMERCIAL

## 2024-11-12 VITALS
WEIGHT: 168 LBS | DIASTOLIC BLOOD PRESSURE: 80 MMHG | HEART RATE: 76 BPM | BODY MASS INDEX: 26.37 KG/M2 | OXYGEN SATURATION: 97 % | TEMPERATURE: 96.8 F | HEIGHT: 67 IN | SYSTOLIC BLOOD PRESSURE: 154 MMHG

## 2024-11-12 DIAGNOSIS — E78.2 MIXED HYPERLIPIDEMIA: ICD-10-CM

## 2024-11-12 DIAGNOSIS — R71.8 LOW MEAN CORPUSCULAR VOLUME (MCV): ICD-10-CM

## 2024-11-12 DIAGNOSIS — F99 INSOMNIA DUE TO OTHER MENTAL DISORDER: ICD-10-CM

## 2024-11-12 DIAGNOSIS — H92.01 EARACHE ON RIGHT: ICD-10-CM

## 2024-11-12 DIAGNOSIS — Z00.00 HEALTH CARE MAINTENANCE: Primary | ICD-10-CM

## 2024-11-12 DIAGNOSIS — R73.03 PREDIABETES: ICD-10-CM

## 2024-11-12 DIAGNOSIS — F32.A DEPRESSIVE DISORDER: ICD-10-CM

## 2024-11-12 DIAGNOSIS — F51.05 INSOMNIA DUE TO OTHER MENTAL DISORDER: ICD-10-CM

## 2024-11-12 PROCEDURE — G2211 COMPLEX E/M VISIT ADD ON: HCPCS

## 2024-11-12 PROCEDURE — 99213 OFFICE O/P EST LOW 20 MIN: CPT

## 2024-11-12 NOTE — ASSESSMENT & PLAN NOTE
Managed with diet.  Recommend checking A1c prior to next office visit.  Orders:    Comprehensive metabolic panel; Future    Hemoglobin A1C; Future

## 2024-11-12 NOTE — ASSESSMENT & PLAN NOTE
Stable on Paxil 30 mg daily.  Patient would like to establish with a psychiatrist to discuss medication management.  Referral placed.  Depression Screening Follow-up Plan: Patient's depression screening was positive with a PHQ-9 score of 6. Patient with underlying depression and was advised to continue current medications as prescribed.    Orders:    Ambulatory referral to Psych Services; Future

## 2024-11-12 NOTE — PROGRESS NOTES
Ambulatory Visit  Name: Marie Pope      : 1950      MRN: [id number]  Encounter Provider: Carol Spence PA-C  Encounter Date: 2024   Encounter department: [facility] [place]    Assessment & [facility]  Health care maintenance  Discussed recommended screenings including breast cancer screening, colon cancer, osteoporosis, HIV, hep C refuses all screening tests and also states that she is opposed to vaccinations.  Recommend repeating labs and following up in the office in about 3 months and sooner as needed.       Prediabetes  Managed with diet.  Recommend checking A1c prior to next office visit.  Orders:    Comprehensive metabolic panel; Future    Hemoglobin A1C; Future    Mixed hyperlipidemia  Continue Lipitor 40 mg daily.  Will recheck cholesterol levels prior to next office visit.  Orders:    Lipid Panel with Direct LDL reflex; Future    Depressive disorder  Stable on Paxil 30 mg daily.  Patient would like to establish with a psychiatrist to discuss medication management.  Referral placed.  Depression Screening Follow-up Plan: Patient's depression screening was positive with a PHQ-9 score of 6. Patient with underlying depression and was advised to continue current medications as prescribed.    Orders:    Ambulatory referral to Psych Services; Future    Insomnia due to other mental disorder  Discussed that I would not recommend increasing temazepam or restarting Ambien as these are both medications that have addictive potential.  I recommend following up with sleep medicine for further evaluation and management of her insomnia.  LOV note reviewed.  Orders:    Ambulatory Referral to Sleep Medicine; Future    Earache on right  No evidence of ear infection.  Possibly related to fluid behind the ear or allergies.  Recommend she try an allergy medication like Zyrtec or Claritin and a nasal spray like Flonase.  Follow-up if symptoms worsen.       Low mean corpuscular volume (MCV)  Mild  "decrease in MCV.  Recommend rechecking with annual labs prior to 3-month follow-up for monitoring.  Orders:    CBC and differential; Future       History of Present Illness     Marie is a 74-year-old female presenting for routine follow-up and with complaint of right ear ache for the past 1 day.  She denies any other cold-like symptoms such as sore throat, cough, congestion, fevers.  No sick contacts.  She complains of chronic insomnia.  She states that Ambien was stopped and she was transition to temazepam, but she has been needed to take increasing doses of temazepam in order to sleep.  Patient states that Paxil is doing very well to control her depression, but she would like to establish with a psychiatrist who can prescribe medications.          Review of Systems   Constitutional:  Negative for chills and fever.   HENT:  Positive for ear pain (Right ear discomfort). Negative for congestion, ear discharge and sore throat.    Eyes:  Negative for pain and visual disturbance.   Respiratory:  Negative for cough and shortness of breath.    Cardiovascular:  Negative for chest pain and palpitations.   Gastrointestinal:  Negative for abdominal pain, constipation, diarrhea and vomiting.   Genitourinary:  Negative for dysuria and hematuria.   Musculoskeletal:  Negative for arthralgias, back pain and myalgias.   Skin:  Negative for color change and rash.   Neurological:  Negative for dizziness, seizures, syncope and headaches.   All other systems reviewed and are negative.          Objective     /80   Pulse 76   Temp (!) 96.8 °F (36 °C) (Tympanic)   Ht 5' 7\" (1.702 m)   Wt 76.2 kg (168 lb)   SpO2 97%   BMI 26.31 kg/m²     Physical Exam  Vitals and nursing note reviewed.   Constitutional:       General: She is not in acute distress.     Appearance: She is well-developed.   HENT:      Head: Normocephalic and atraumatic.      Right Ear: Tympanic membrane normal. No drainage, swelling or tenderness. No middle ear " effusion. There is no impacted cerumen. No foreign body. Tympanic membrane is not injected, scarred, perforated, erythematous, retracted or bulging.      Left Ear: Tympanic membrane normal. No drainage, swelling or tenderness.  No middle ear effusion. There is no impacted cerumen. No foreign body. Tympanic membrane is not injected, scarred, perforated, erythematous, retracted or bulging.      Nose: Nose normal.      Mouth/Throat:      Mouth: Mucous membranes are moist.      Pharynx: Oropharynx is clear. No oropharyngeal exudate.   Eyes:      Extraocular Movements: Extraocular movements intact.      Conjunctiva/sclera: Conjunctivae normal.   Cardiovascular:      Rate and Rhythm: Normal rate and regular rhythm.      Heart sounds: Normal heart sounds. No murmur heard.     No friction rub. No gallop.   Pulmonary:      Effort: Pulmonary effort is normal. No respiratory distress.      Breath sounds: Normal breath sounds. No wheezing, rhonchi or rales.   Abdominal:      Palpations: Abdomen is soft.      Tenderness: There is no abdominal tenderness.   Musculoskeletal:         General: No swelling.      Cervical back: Neck supple.   Skin:     General: Skin is warm and dry.      Capillary Refill: Capillary refill takes less than 2 seconds.   Neurological:      General: No focal deficit present.      Mental Status: She is alert.   Psychiatric:         Mood and Affect: Mood normal.       Depression Screening Follow-up Plan: Patient's depression screening was positive with a PHQ-2 score of . Their PHQ-9 score was 6. Patient with underlying depression and was advised to continue current medications as prescribed.

## 2024-11-12 NOTE — ASSESSMENT & PLAN NOTE
Discussed that I would not recommend increasing temazepam or restarting Ambien as these are both medications that have addictive potential.  I recommend following up with sleep medicine for further evaluation and management of her insomnia.  LOV note reviewed.  Orders:    Ambulatory Referral to Sleep Medicine; Future

## 2024-11-12 NOTE — ASSESSMENT & PLAN NOTE
Continue Lipitor 40 mg daily.  Will recheck cholesterol levels prior to next office visit.  Orders:    Lipid Panel with Direct LDL reflex; Future

## 2024-11-15 ENCOUNTER — TELEPHONE (OUTPATIENT)
Age: 74
End: 2024-11-15

## 2024-11-15 NOTE — TELEPHONE ENCOUNTER
Patient called stating she is in the office the other day and stated her ear hurts. No medication was sent over at that time.    Now the pain has become severe and patient is asking if an antibiotic could be send over for an ear infection.     [facility] - [place]

## 2024-11-22 DIAGNOSIS — F51.05 INSOMNIA DUE TO OTHER MENTAL DISORDER: ICD-10-CM

## 2024-11-22 DIAGNOSIS — F99 INSOMNIA DUE TO OTHER MENTAL DISORDER: ICD-10-CM

## 2024-11-22 NOTE — TELEPHONE ENCOUNTER
Medication: temazepam (RESTORIL) 30 mg capsule     Dose/Frequency: Take 1 capsule (30 mg total) by mouth daily at bedtime as needed for sleep     Quantity: 30 capsule     Pharmacy:  [id number] - [place] - [address]     Office:   [x] PCP/Provider -   [] Speciality/Provider -     Does the patient have enough for 3 days?   [] Yes   [] No - Send as HP to POD

## 2024-11-23 DIAGNOSIS — F51.05 INSOMNIA DUE TO OTHER MENTAL DISORDER: ICD-10-CM

## 2024-11-23 DIAGNOSIS — F99 INSOMNIA DUE TO OTHER MENTAL DISORDER: ICD-10-CM

## 2024-11-25 RX ORDER — TEMAZEPAM 30 MG/1
30 CAPSULE ORAL
Qty: 30 CAPSULE | Refills: 0 | OUTPATIENT
Start: 2024-11-25 | End: 2024-12-25

## 2024-11-25 RX ORDER — TEMAZEPAM 30 MG/1
CAPSULE ORAL
Qty: 30 CAPSULE | Refills: 0 | Status: SHIPPED | OUTPATIENT
Start: 2024-11-25

## 2024-12-23 DIAGNOSIS — F51.05 INSOMNIA DUE TO OTHER MENTAL DISORDER: ICD-10-CM

## 2024-12-23 DIAGNOSIS — F99 INSOMNIA DUE TO OTHER MENTAL DISORDER: ICD-10-CM

## 2024-12-23 RX ORDER — TEMAZEPAM 30 MG/1
CAPSULE ORAL
Qty: 30 CAPSULE | Refills: 0 | Status: SHIPPED | OUTPATIENT
Start: 2024-12-23

## 2024-12-23 NOTE — TELEPHONE ENCOUNTER
Reason for call:   [x] Refill-patient due for refill on christmas requesting to be able to fill a day early since everything will be closed on christmas.   [] Prior Auth  [] Other:     Office:   [x] PCP/Provider - [facility] / Jann Le MD   [] Specialty/Provider -     Medication: temazepam (RESTORIL) 30 mg capsule     Dose/Frequency: TAKE 1 CAPSULE(30 MG) BY MOUTH DAILY AT BEDTIME AS NEEDED FOR SLEEP     Quantity: 30    Pharmacy: [facility] #[id number]  [place] - [address]     Does the patient have enough for 3 days?   [] Yes   [x] No - Send as HP to POD

## 2025-01-10 DIAGNOSIS — F32.A DEPRESSIVE DISORDER: ICD-10-CM

## 2025-01-10 DIAGNOSIS — E78.2 MIXED HYPERLIPIDEMIA: ICD-10-CM

## 2025-01-10 RX ORDER — ATORVASTATIN CALCIUM 40 MG/1
40 TABLET, FILM COATED ORAL
Qty: 30 TABLET | Refills: 0 | Status: SHIPPED | OUTPATIENT
Start: 2025-01-10

## 2025-01-10 RX ORDER — PAROXETINE 30 MG/1
TABLET, FILM COATED ORAL
Qty: 90 TABLET | Refills: 1 | Status: SHIPPED | OUTPATIENT
Start: 2025-01-10

## 2025-01-20 ENCOUNTER — TELEPHONE (OUTPATIENT)
Age: 75
End: 2025-01-20

## 2025-01-22 DIAGNOSIS — F99 INSOMNIA DUE TO OTHER MENTAL DISORDER: ICD-10-CM

## 2025-01-22 DIAGNOSIS — F51.05 INSOMNIA DUE TO OTHER MENTAL DISORDER: ICD-10-CM

## 2025-01-22 NOTE — TELEPHONE ENCOUNTER
Reason for call:   [x] Refill   [] Prior Auth  [] Other:     Office:   [x] PCP/Provider - [facility] Le MD   [] Specialty/Provider -     Medication:  temazepam (RESTORIL) 30 mg capsule    Dose/Frequency: TAKE 1 CAPSULE(30 MG) BY MOUTH DAILY AT BEDTIME AS NEEDED FOR SLEEP     Quantity: 30 capsule     Pharmacy: [facility] [id number]  [place] [address] [phone]    Does the patient have enough for 3 days?   [] Yes   [x] No - Send as HP to POD

## 2025-01-24 RX ORDER — TEMAZEPAM 30 MG/1
CAPSULE ORAL
Qty: 30 CAPSULE | Refills: 0 | Status: SHIPPED | OUTPATIENT
Start: 2025-01-24

## 2025-02-02 DIAGNOSIS — F32.A DEPRESSIVE DISORDER: ICD-10-CM

## 2025-02-02 DIAGNOSIS — E78.2 MIXED HYPERLIPIDEMIA: ICD-10-CM

## 2025-02-03 ENCOUNTER — TELEPHONE (OUTPATIENT)
Age: 75
End: 2025-02-03

## 2025-02-03 RX ORDER — PAROXETINE 30 MG/1
TABLET, FILM COATED ORAL
Qty: 90 TABLET | Refills: 0 | OUTPATIENT
Start: 2025-02-03

## 2025-02-03 NOTE — TELEPHONE ENCOUNTER
Patient calling to inquire if her Lipitor and Paxil have been ordered. Confirmed with patient that [facility] in [place] received the medication orders on 1/10/25. Patient will call the pharmacy to arrange .

## 2025-02-04 RX ORDER — ATORVASTATIN CALCIUM 40 MG/1
40 TABLET, FILM COATED ORAL
Qty: 90 TABLET | Refills: 0 | Status: SHIPPED | OUTPATIENT
Start: 2025-02-04

## 2025-02-18 ENCOUNTER — OFFICE VISIT (OUTPATIENT)
Age: 75
End: 2025-02-18
Payer: COMMERCIAL

## 2025-02-18 VITALS
OXYGEN SATURATION: 95 % | WEIGHT: 167.4 LBS | RESPIRATION RATE: 16 BRPM | TEMPERATURE: 97.8 F | BODY MASS INDEX: 26.27 KG/M2 | SYSTOLIC BLOOD PRESSURE: 132 MMHG | HEIGHT: 67 IN | HEART RATE: 66 BPM | DIASTOLIC BLOOD PRESSURE: 72 MMHG

## 2025-02-18 DIAGNOSIS — E78.2 MIXED HYPERLIPIDEMIA: ICD-10-CM

## 2025-02-18 DIAGNOSIS — F32.A DEPRESSIVE DISORDER: ICD-10-CM

## 2025-02-18 DIAGNOSIS — F51.05 INSOMNIA DUE TO OTHER MENTAL DISORDER: Primary | ICD-10-CM

## 2025-02-18 DIAGNOSIS — F99 INSOMNIA DUE TO OTHER MENTAL DISORDER: Primary | ICD-10-CM

## 2025-02-18 PROCEDURE — G2211 COMPLEX E/M VISIT ADD ON: HCPCS | Performed by: FAMILY MEDICINE

## 2025-02-18 PROCEDURE — 99214 OFFICE O/P EST MOD 30 MIN: CPT | Performed by: FAMILY MEDICINE

## 2025-02-18 NOTE — PROGRESS NOTES
"Name: Marie Pope      : 1950      MRN: [id number]  Encounter Provider: Lucia Posada DO  Encounter Date: 2025   Encounter department: [facility] [place]  :  Assessment & Plan  Insomnia due to other mental disorder  Secondary to significant depression and anxiety.  Fair response to temazepam 30 mg q. nightly.  Continues to request Ambien.  Will not reinitiate       Depressive disorder  Partial remission.  On Paxil 30 mg daily.  Only SSRI to provide mild improvement of symptoms. recommend behavioral health counseling however patient not amendable.  No SI/HI         Mixed hyperlipidemia  On statin              History of Present Illness   Patient presents for follow-up.  Discussed sleep and mood      Review of Systems   Constitutional:  Negative for fever.   Respiratory:  Negative for shortness of breath.    Cardiovascular:  Negative for chest pain.   Psychiatric/Behavioral:  Positive for dysphoric mood and sleep disturbance. The patient is nervous/anxious.        Objective   /72 (BP Location: Left arm, Patient Position: Sitting, Cuff Size: Standard)   Pulse 66   Temp 97.8 °F (36.6 °C) (Tympanic)   Resp 16   Ht 5' 7\" (1.702 m)   Wt 75.9 kg (167 lb 6.4 oz)   SpO2 95%   BMI 26.22 kg/m²      Physical Exam  HENT:      Head: Normocephalic.   Cardiovascular:      Rate and Rhythm: Normal rate and regular rhythm.   Pulmonary:      Effort: Pulmonary effort is normal.      Breath sounds: Normal breath sounds.   Neurological:      Mental Status: She is alert and oriented to person, place, and time.      Gait: Gait normal.   Psychiatric:         Attention and Perception: Attention normal.         Mood and Affect: Mood is depressed. Affect is flat.         Speech: Speech is delayed.         Behavior: Behavior is cooperative.         Thought Content: Thought content does not include homicidal or suicidal ideation.         "

## 2025-02-23 DIAGNOSIS — F99 INSOMNIA DUE TO OTHER MENTAL DISORDER: ICD-10-CM

## 2025-02-23 DIAGNOSIS — F51.05 INSOMNIA DUE TO OTHER MENTAL DISORDER: ICD-10-CM

## 2025-02-24 RX ORDER — TEMAZEPAM 30 MG/1
CAPSULE ORAL
Qty: 30 CAPSULE | Refills: 0 | Status: SHIPPED | OUTPATIENT
Start: 2025-02-24

## 2025-02-24 NOTE — ASSESSMENT & PLAN NOTE
Secondary to significant depression and anxiety.  Fair response to temazepam 30 mg q. nightly.  Continues to request Ambien.  Will not reinitiate

## 2025-02-24 NOTE — ASSESSMENT & PLAN NOTE
Partial remission.  On Paxil 30 mg daily.  Only SSRI to provide mild improvement of symptoms. recommend behavioral health counseling however patient not amendable.  No SI/HI

## 2025-03-11 ENCOUNTER — TELEPHONE (OUTPATIENT)
Dept: NEUROLOGY | Facility: CLINIC | Age: 75
End: 2025-03-11

## 2025-03-17 DIAGNOSIS — E78.2 MIXED HYPERLIPIDEMIA: ICD-10-CM

## 2025-03-18 RX ORDER — ATORVASTATIN CALCIUM 40 MG/1
40 TABLET, FILM COATED ORAL
Qty: 90 TABLET | Refills: 0 | Status: SHIPPED | OUTPATIENT
Start: 2025-03-18

## 2025-03-24 DIAGNOSIS — F99 INSOMNIA DUE TO OTHER MENTAL DISORDER: ICD-10-CM

## 2025-03-24 DIAGNOSIS — F51.05 INSOMNIA DUE TO OTHER MENTAL DISORDER: ICD-10-CM

## 2025-03-25 RX ORDER — TEMAZEPAM 30 MG/1
CAPSULE ORAL
Qty: 30 CAPSULE | Refills: 0 | Status: SHIPPED | OUTPATIENT
Start: 2025-03-25

## 2025-04-23 DIAGNOSIS — F99 INSOMNIA DUE TO OTHER MENTAL DISORDER: ICD-10-CM

## 2025-04-23 DIAGNOSIS — F51.05 INSOMNIA DUE TO OTHER MENTAL DISORDER: ICD-10-CM

## 2025-04-23 RX ORDER — TEMAZEPAM 30 MG/1
CAPSULE ORAL
Qty: 30 CAPSULE | Refills: 0 | Status: SHIPPED | OUTPATIENT
Start: 2025-04-23

## 2025-05-08 DIAGNOSIS — F32.A DEPRESSIVE DISORDER: ICD-10-CM

## 2025-05-08 RX ORDER — PAROXETINE 30 MG/1
TABLET, FILM COATED ORAL
Qty: 90 TABLET | Refills: 1 | Status: SHIPPED | OUTPATIENT
Start: 2025-05-08

## 2025-05-23 DIAGNOSIS — F51.05 INSOMNIA DUE TO OTHER MENTAL DISORDER: ICD-10-CM

## 2025-05-23 DIAGNOSIS — F99 INSOMNIA DUE TO OTHER MENTAL DISORDER: ICD-10-CM

## 2025-05-23 NOTE — TELEPHONE ENCOUNTER
Reason for call:   [x] Refill   [] Prior Auth  [] Other:     Office:   [x] PCP/Provider -   [] Specialty/Provider -     Medication: Temazepam 30 mg, take 1 capsule by mouth daily at bedtime      Pharmacy: [facility]     Local [facility]acy   Does the patient have enough for 3 days?   [] Yes   [x] No - Send as HP to POD

## 2025-05-24 ENCOUNTER — APPOINTMENT (EMERGENCY)
Dept: CT IMAGING | Facility: HOSPITAL | Age: 75
End: 2025-05-24
Payer: COMMERCIAL

## 2025-05-24 ENCOUNTER — APPOINTMENT (EMERGENCY)
Dept: RADIOLOGY | Facility: HOSPITAL | Age: 75
End: 2025-05-24
Payer: COMMERCIAL

## 2025-05-24 ENCOUNTER — HOSPITAL ENCOUNTER (EMERGENCY)
Facility: HOSPITAL | Age: 75
Discharge: HOME | End: 2025-05-24
Attending: EMERGENCY MEDICINE | Admitting: EMERGENCY MEDICINE
Payer: COMMERCIAL

## 2025-05-24 VITALS
SYSTOLIC BLOOD PRESSURE: 175 MMHG | TEMPERATURE: 97.9 F | HEART RATE: 57 BPM | DIASTOLIC BLOOD PRESSURE: 84 MMHG | BODY MASS INDEX: 25.84 KG/M2 | RESPIRATION RATE: 18 BRPM | OXYGEN SATURATION: 96 % | WEIGHT: 165 LBS

## 2025-05-24 DIAGNOSIS — G47.00 INSOMNIA: Primary | ICD-10-CM

## 2025-05-24 DIAGNOSIS — R41.0 INTERMITTENT CONFUSION: ICD-10-CM

## 2025-05-24 LAB
2HR DELTA HS TROPONIN: 0 NG/L
ALBUMIN SERPL BCG-MCNC: 4 G/DL (ref 3.5–5)
ALP SERPL-CCNC: 83 U/L (ref 34–104)
ALT SERPL W P-5'-P-CCNC: 7 U/L (ref 7–52)
ANION GAP SERPL CALCULATED.3IONS-SCNC: 7 MMOL/L (ref 4–13)
AST SERPL W P-5'-P-CCNC: 14 U/L (ref 13–39)
ATRIAL RATE: 69 BPM
ATRIAL RATE: 70 BPM
BASOPHILS # BLD AUTO: 0.05 THOUSANDS/ÂΜL (ref 0–0.1)
BASOPHILS NFR BLD AUTO: 1 % (ref 0–1)
BILIRUB SERPL-MCNC: 1.09 MG/DL (ref 0.2–1)
BILIRUB UR QL STRIP: NEGATIVE
BUN SERPL-MCNC: 11 MG/DL (ref 5–25)
CALCIUM SERPL-MCNC: 9 MG/DL (ref 8.4–10.2)
CARDIAC TROPONIN I PNL SERPL HS: 3 NG/L (ref ?–50)
CARDIAC TROPONIN I PNL SERPL HS: 3 NG/L (ref ?–50)
CHLORIDE SERPL-SCNC: 103 MMOL/L (ref 96–108)
CLARITY UR: CLEAR
CO2 SERPL-SCNC: 29 MMOL/L (ref 21–32)
COLOR UR: YELLOW
CREAT SERPL-MCNC: 0.69 MG/DL (ref 0.6–1.3)
EOSINOPHIL # BLD AUTO: 0.09 THOUSAND/ÂΜL (ref 0–0.61)
EOSINOPHIL NFR BLD AUTO: 1 % (ref 0–6)
ERYTHROCYTE [DISTWIDTH] IN BLOOD BY AUTOMATED COUNT: 13.5 % (ref 11.6–15.1)
GFR SERPL CREATININE-BSD FRML MDRD: 85 ML/MIN/1.73SQ M
GLUCOSE SERPL-MCNC: 104 MG/DL (ref 65–140)
GLUCOSE SERPL-MCNC: 105 MG/DL (ref 65–140)
GLUCOSE UR STRIP-MCNC: NEGATIVE MG/DL
HCT VFR BLD AUTO: 39.1 % (ref 34.8–46.1)
HGB BLD-MCNC: 12.3 G/DL (ref 11.5–15.4)
HGB UR QL STRIP.AUTO: NEGATIVE
IMM GRANULOCYTES # BLD AUTO: 0.02 THOUSAND/UL (ref 0–0.2)
IMM GRANULOCYTES NFR BLD AUTO: 0 % (ref 0–2)
KETONES UR STRIP-MCNC: NEGATIVE MG/DL
LEUKOCYTE ESTERASE UR QL STRIP: NEGATIVE
LYMPHOCYTES # BLD AUTO: 1.65 THOUSANDS/ÂΜL (ref 0.6–4.47)
LYMPHOCYTES NFR BLD AUTO: 22 % (ref 14–44)
MAGNESIUM SERPL-MCNC: 2 MG/DL (ref 1.9–2.7)
MCH RBC QN AUTO: 26.2 PG (ref 26.8–34.3)
MCHC RBC AUTO-ENTMCNC: 31.5 G/DL (ref 31.4–37.4)
MCV RBC AUTO: 83 FL (ref 82–98)
MONOCYTES # BLD AUTO: 0.39 THOUSAND/ÂΜL (ref 0.17–1.22)
MONOCYTES NFR BLD AUTO: 5 % (ref 4–12)
NEUTROPHILS # BLD AUTO: 5.3 THOUSANDS/ÂΜL (ref 1.85–7.62)
NEUTS SEG NFR BLD AUTO: 71 % (ref 43–75)
NITRITE UR QL STRIP: NEGATIVE
NRBC BLD AUTO-RTO: 0 /100 WBCS
P AXIS: 49 DEGREES
P AXIS: 54 DEGREES
PH UR STRIP.AUTO: 6.5 [PH]
PLATELET # BLD AUTO: 249 THOUSANDS/UL (ref 149–390)
PMV BLD AUTO: 10.8 FL (ref 8.9–12.7)
POTASSIUM SERPL-SCNC: 3.6 MMOL/L (ref 3.5–5.3)
PR INTERVAL: 148 MS
PR INTERVAL: 158 MS
PROT SERPL-MCNC: 7.6 G/DL (ref 6.4–8.4)
PROT UR STRIP-MCNC: NEGATIVE MG/DL
QRS AXIS: 10 DEGREES
QRS AXIS: 20 DEGREES
QRSD INTERVAL: 66 MS
QRSD INTERVAL: 70 MS
QT INTERVAL: 422 MS
QT INTERVAL: 438 MS
QTC INTERVAL: 455 MS
QTC INTERVAL: 469 MS
RBC # BLD AUTO: 4.69 MILLION/UL (ref 3.81–5.12)
SODIUM SERPL-SCNC: 139 MMOL/L (ref 135–147)
SP GR UR STRIP.AUTO: 1.01 (ref 1–1.03)
T WAVE AXIS: 79 DEGREES
T WAVE AXIS: 91 DEGREES
TSH SERPL DL<=0.05 MIU/L-ACNC: 3.31 UIU/ML (ref 0.45–4.5)
UROBILINOGEN UR QL STRIP.AUTO: 1 E.U./DL
VENTRICULAR RATE: 69 BPM
VENTRICULAR RATE: 70 BPM
WBC # BLD AUTO: 7.5 THOUSAND/UL (ref 4.31–10.16)

## 2025-05-24 PROCEDURE — 36415 COLL VENOUS BLD VENIPUNCTURE: CPT | Performed by: EMERGENCY MEDICINE

## 2025-05-24 PROCEDURE — 82948 REAGENT STRIP/BLOOD GLUCOSE: CPT

## 2025-05-24 PROCEDURE — 99285 EMERGENCY DEPT VISIT HI MDM: CPT | Performed by: EMERGENCY MEDICINE

## 2025-05-24 PROCEDURE — 81003 URINALYSIS AUTO W/O SCOPE: CPT | Performed by: EMERGENCY MEDICINE

## 2025-05-24 PROCEDURE — 96360 HYDRATION IV INFUSION INIT: CPT

## 2025-05-24 PROCEDURE — 83735 ASSAY OF MAGNESIUM: CPT | Performed by: EMERGENCY MEDICINE

## 2025-05-24 PROCEDURE — 80053 COMPREHEN METABOLIC PANEL: CPT | Performed by: EMERGENCY MEDICINE

## 2025-05-24 PROCEDURE — 71045 X-RAY EXAM CHEST 1 VIEW: CPT

## 2025-05-24 PROCEDURE — 70498 CT ANGIOGRAPHY NECK: CPT

## 2025-05-24 PROCEDURE — 93005 ELECTROCARDIOGRAM TRACING: CPT

## 2025-05-24 PROCEDURE — 84443 ASSAY THYROID STIM HORMONE: CPT | Performed by: EMERGENCY MEDICINE

## 2025-05-24 PROCEDURE — 70496 CT ANGIOGRAPHY HEAD: CPT

## 2025-05-24 PROCEDURE — 84484 ASSAY OF TROPONIN QUANT: CPT | Performed by: EMERGENCY MEDICINE

## 2025-05-24 PROCEDURE — 99285 EMERGENCY DEPT VISIT HI MDM: CPT

## 2025-05-24 PROCEDURE — 93010 ELECTROCARDIOGRAM REPORT: CPT | Performed by: INTERNAL MEDICINE

## 2025-05-24 PROCEDURE — 85025 COMPLETE CBC W/AUTO DIFF WBC: CPT | Performed by: EMERGENCY MEDICINE

## 2025-05-24 RX ORDER — TEMAZEPAM 15 MG/1
30 CAPSULE ORAL ONCE
Status: COMPLETED | OUTPATIENT
Start: 2025-05-24 | End: 2025-05-24

## 2025-05-24 RX ADMIN — SODIUM CHLORIDE 1000 ML: 0.9 INJECTION, SOLUTION INTRAVENOUS at 16:54

## 2025-05-24 RX ADMIN — IOHEXOL 80 ML: 350 INJECTION, SOLUTION INTRAVENOUS at 17:43

## 2025-05-24 RX ADMIN — TEMAZEPAM 30 MG: 15 CAPSULE ORAL at 20:43

## 2025-05-24 NOTE — ED PROVIDER NOTES
"Time reflects when diagnosis was documented in both MDM as applicable and the Disposition within this note       Time User Action Codes Description Comment    5/24/2025  8:27 PM Yvonne Dunn Add [G47.00] Insomnia     5/24/2025  8:28 PM Yvonne Dunn Add [R41.0] Intermittent confusion           ED Disposition       ED Disposition   Discharge    Condition   Stable    Date/Time   Sat May 24, 2025  8:27 PM    Comment   Marie Cavazosoconnor discharge to home/self care.                   Assessment & Plan       Medical Decision Making  74-year-old female is coming in with multiple complaints but generally not feeling right and insomnia and anxiety and just feeling more confused and was worried she is withdrawing from her medication.  Patient reports she has significant issues with depression and insomnia reportedly since family members death.  Reports she had an \"unfortunate incident a year ago around that time\" where she had an inpatient psychiatric stay for few days.  Reports that she takes Restoril for sleep every night but reports there was an issue with the pharmacy that she has not been able to get her medication and has not taken her sleeping medication for the past 3 to 4 days and has not slept.  Now she is feeling like at times she will all of a sudden get a memory of a smell that is very pleasant that is seeming to trigger a pleasant memory but she cannot remember which one it specifically is that is associated with that smell and then she reports that she will just feel generally last confused and not as clear in her thoughts for a minute or 2 and it resolves and this has been off and on for the past 2 or 3 days since she has not taken her medication.  Then reports she will have a feeling of a little mild discomfort in her head and some nausea and then will just feel fatigued and anxious.  She notes sometimes when she is reading she will sometimes see to but she does not have any actual blurred or " double vision at present now and its only when she is reading.  She has no vision change or visual field deficits now has no actual aphasia or dysarthria she just feels more confused and her thoughts with her waxing and waning and last less than 5 minutes at a time.  She has no arm or leg weakness.  Does report just generally feeling anxious and having some mild chest discomfort.    Given patient with multiple complaints and headache and her age we will get cardiac evaluation with serial EKGs and troponins.  Given her insomnia and not having her recent sleeping medication could be consequences of insomnia and medication withdrawal which patient thinks this is what it is but will get CTA of her head and will get labs including for UTI for other things that can cause confusion like thyroid and electrolytes so we will check basic labs and thyroid and cardiac workup and CT scan of head.  Given nonspecific of symptoms and waxing and waning and last for minutes the time less likely to be stroke but will scan her head and will talk with her if she would like potential admission for further evaluation.    Amount and/or Complexity of Data Reviewed  Labs: ordered.  Radiology: ordered and independent interpretation performed.    Risk  Prescription drug management.  Risk Details: Discussed with patient her labs and her CT scan results and workup is negative.  Patient is awake alert without any confusion she has a completely normal neuroexam but intermittently when talking about her sleeplessness would start getting tearful and appear somewhat anxious.  Daughter is also at bedside.  Patient just would like to take a dose of her sleeping medication and go home and sleep does not want to stay.  Patient and daughter feel comfortable this and will try to follow-up and get her medication and follow-up with her PCP.             Medications   sodium chloride 0.9 % bolus 1,000 mL (0 mL Intravenous Stopped 5/24/25 4404)   iohexol  (OMNIPAQUE) 350 MG/ML injection (MULTI-DOSE) 80 mL (80 mL Intravenous Given 5/24/25 1743)   temazepam (RESTORIL) capsule 30 mg (30 mg Oral Given 5/24/25 2043)       ED Risk Strat Scores         Stroke Assessment       Row Name 05/24/25 1638             NIH Stroke Scale    Interval Baseline      Level of Consciousness (1a.) 0      LOC Questions (1b.) 0      LOC Commands (1c.) 0      Best Gaze (2.) 0      Visual (3.) 0      Facial Palsy (4.) 0      Motor Arm, Left (5a.) 0      Motor Arm, Right (5b.) 0      Motor Leg, Left (6a.) 0      Motor Leg, Right (6b.) 0      Limb Ataxia (7.) 0      Sensory (8.) 0      Best Language (9.) 0      Dysarthria (10.) 0      Extinction and Inattention (11.) (Formerly Neglect) 0      Total 0                    Flowsheet Row Most Recent Value   Thrombolytic Decision Options    Thrombolytic Decision Patient not a candidate.   Patient is not a candidate options Symptoms resolved/clearly non disabling.                    No data recorded        SBIRT 20yo+      Flowsheet Row Most Recent Value   Initial Alcohol Screen: US AUDIT-C     1. How often do you have a drink containing alcohol? 0 Filed at: 05/24/2025 1603   2. How many drinks containing alcohol do you have on a typical day you are drinking?  0 Filed at: 05/24/2025 1603   3a. Male UNDER 65: How often do you have five or more drinks on one occasion? 0 Filed at: 05/24/2025 1603   3b. FEMALE Any Age, or MALE 65+: How often do you have 4 or more drinks on one occassion? 0 Filed at: 05/24/2025 1603   Audit-C Score 0 Filed at: 05/24/2025 1603   JORGE: How many times in the past year have you...    Used an illegal drug or used a prescription medication for non-medical reasons? Never Filed at: 05/24/2025 1603                            History of Present Illness       Chief Complaint   Patient presents with    Altered Mental Status     Pt states she was home when she had a memory of an attractive smell but couldn't remember what it was, then  she became disoriented and had nausea and a headache. Later in the day, the same thing happened, she got what seems like an aura of smell, then became disoriented, got a headache, and nausea. Pt c/o low grade headache.     Diplopia     Pt also reports double vision with reading only that started last night.        Past Medical History[1]   Past Surgical History[2]   Family History[3]   Social History[4]   E-Cigarette/Vaping    E-Cigarette Use Never User       E-Cigarette/Vaping Substances    Nicotine No       I have reviewed and agree with the history as documented.       History provided by:  Patient  Altered Mental Status  Presenting symptoms: confusion    Severity:  Mild  Most recent episode:  Today  Episode history:  Multiple  Duration:  3 minutes  Timing:  Intermittent  Progression:  Waxing and waning  Chronicity:  New  Context: recent change in medication (Pt reports that she normally takes temazepam for sleep but there was issue with her prescirption and pharmacy and has not had it in a few days, the past 3-4 and since then had not slept and she thinks this is insomnia and medication withdrawal issue)    Associated symptoms: depression (Has had issues with dpression and anxiety and was on antibdpressant and sleeping medication), headaches (has mild diffuse HA) and visual change (States when she is reading sometimes issue with focuing and seeing double, but has not issue with blurred vision or seeing double now and that has been off/on for past week)    Associated symptoms: no abdominal pain, normal movement, no agitation, no bladder incontinence, no difficulty breathing, no eye deviation, no fever, no light-headedness, no nausea, no palpitations, no seizures, no slurred speech, no suicidal behavior, no vomiting and no weakness        Review of Systems   Constitutional:  Negative for fever.   Cardiovascular:  Negative for palpitations.   Gastrointestinal:  Negative for abdominal pain, nausea and vomiting.    Genitourinary:  Negative for bladder incontinence.   Neurological:  Positive for headaches (has mild diffuse HA). Negative for seizures, weakness and light-headedness.   Psychiatric/Behavioral:  Positive for confusion. Negative for agitation.    All other systems reviewed and are negative.          Objective       ED Triage Vitals [05/24/25 1601]   Temperature Pulse Blood Pressure Respirations SpO2 Patient Position - Orthostatic VS   97.9 °F (36.6 °C) 61 156/86 18 96 % Sitting      Temp Source Heart Rate Source BP Location FiO2 (%) Pain Score    Temporal Monitor Left arm -- --      Vitals      Date and Time Temp Pulse SpO2 Resp BP Pain Score FACES Pain Rating User   05/24/25 1931 -- 57 96 % 18 175/84 -- -- KW   05/24/25 1901 -- 53 96 % -- 184/79 -- -- KW   05/24/25 1730 -- 56 95 % 16 160/70 -- -- ASM   05/24/25 1601 97.9 °F (36.6 °C) 61 96 % 18 156/86 -- -- RJ            Physical Exam  Vitals and nursing note reviewed.   Constitutional:       General: She is not in acute distress.     Appearance: She is well-developed. She is not ill-appearing, toxic-appearing or diaphoretic.   HENT:      Head: Normocephalic and atraumatic.      Nose: Nose normal.     Eyes:      Extraocular Movements: Extraocular movements intact.      Conjunctiva/sclera: Conjunctivae normal.      Pupils: Pupils are equal, round, and reactive to light.       Cardiovascular:      Rate and Rhythm: Normal rate and regular rhythm.      Heart sounds: Normal heart sounds.   Pulmonary:      Effort: Pulmonary effort is normal. No respiratory distress.      Breath sounds: Normal breath sounds.   Abdominal:      General: There is no distension.      Palpations: Abdomen is soft.      Tenderness: There is no abdominal tenderness.     Musculoskeletal:         General: No deformity. Normal range of motion.      Cervical back: Neck supple.     Skin:     General: Skin is warm and dry.      Findings: No rash.     Neurological:      General: No focal deficit  present.      Mental Status: She is alert and oriented to person, place, and time.      GCS: GCS eye subscore is 4. GCS verbal subscore is 5. GCS motor subscore is 6.      Cranial Nerves: No cranial nerve deficit.      Motor: No weakness.      Gait: Gait normal.     Psychiatric:         Mood and Affect: Mood is anxious.         Results Reviewed       Procedure Component Value Units Date/Time    UA w Reflex to Microscopic w Reflex to Culture [[id number]] Collected: 05/24/25 1947    Lab Status: Final result Specimen: Urine, Other Updated: 05/24/25 2016     Color, UA Yellow     Clarity, UA Clear     Specific Gravity, UA 1.010     pH, UA 6.5     Leukocytes, UA Negative     Nitrite, UA Negative     Protein, UA Negative mg/dl      Glucose, UA Negative mg/dl      Ketones, UA Negative mg/dl      Urobilinogen, UA 1.0 E.U./dl      Bilirubin, UA Negative     Occult Blood, UA Negative    HS Troponin I 2hr [[id number]]  (Normal) Collected: 05/24/25 1856    Lab Status: Final result Specimen: Blood from Arm, Left Updated: 05/24/25 1929     hs TnI 2hr 3 ng/L      Delta 2hr hsTnI 0 ng/L     TSH, 3rd generation with Free T4 reflex [[id number]]  (Normal) Collected: 05/24/25 1652    Lab Status: Final result Specimen: Blood from Arm, Left Updated: 05/24/25 1745     TSH 3RD GENERATON 3.311 uIU/mL     HS Troponin 0hr (reflex protocol) [[id number]]  (Normal) Collected: 05/24/25 1652    Lab Status: Final result Specimen: Blood from Arm, Left Updated: 05/24/25 1737     hs TnI 0hr 3 ng/L     Comprehensive metabolic panel [[id number]]  (Abnormal) Collected: 05/24/25 1652    Lab Status: Final result Specimen: Blood from Arm, Left Updated: 05/24/25 1733     Sodium 139 mmol/L      Potassium 3.6 mmol/L      Chloride 103 mmol/L      CO2 29 mmol/L      ANION GAP 7 mmol/L      BUN 11 mg/dL      Creatinine 0.69 mg/dL      Glucose 104 mg/dL      Calcium 9.0 mg/dL      AST 14 U/L      ALT 7 U/L      Alkaline Phosphatase 83 U/L      Total Protein 7.6 g/dL       Albumin 4.0 g/dL      Total Bilirubin 1.09 mg/dL      eGFR 85 ml/min/1.73sq m     Narrative:      National Kid[employer] guidelines for Chronic Kidney Disease (CKD):     Stage 1 with normal or high GFR (GFR > 90 mL/min/1.73 square meters)    Stage 2 Mild CKD (GFR = 60-89 mL/min/1.73 square meters)    Stage 3A Moderate CKD (GFR = 45-59 mL/min/1.73 square meters)    Stage 3B Moderate CKD (GFR = 30-44 mL/min/1.73 square meters)    Stage 4 Severe CKD (GFR = 15-29 mL/min/1.73 square meters)    Stage 5 End Stage CKD (GFR <15 mL/min/1.73 square meters)  Note: GFR calculation is accurate only with a steady state creatinine    Magnesium [[id number]]  (Normal) Collected: 05/24/25 1652    Lab Status: Final result Specimen: Blood from Arm, Left Updated: 05/24/25 1733     Magnesium 2.0 mg/dL     CBC and differential [[id number]]  (Abnormal) Collected: 05/24/25 1652    Lab Status: Final result Specimen: Blood from Arm, Left Updated: 05/24/25 1659     WBC 7.50 Thousand/uL      RBC 4.69 Million/uL      Hemoglobin 12.3 g/dL      Hematocrit 39.1 %      MCV 83 fL      MCH 26.2 pg      MCHC 31.5 g/dL      RDW 13.5 %      MPV 10.8 fL      Platelets 249 Thousands/uL      nRBC 0 /100 WBCs      Segmented % 71 %      Immature Grans % 0 %      Lymphocytes % 22 %      Monocytes % 5 %      Eosinophils Relative 1 %      Basophils Relative 1 %      Absolute Neutrophils 5.30 Thousands/µL      Absolute Immature Grans 0.02 Thousand/uL      Absolute Lymphocytes 1.65 Thousands/µL      Absolute Monocytes 0.39 Thousand/µL      Eosinophils Absolute 0.09 Thousand/µL      Basophils Absolute 0.05 Thousands/µL     Fingerstick Glucose (POCT) [[id number]]  (Normal) Collected: 05/24/25 1603    Lab Status: Final result Specimen: Blood Updated: 05/24/25 1604     POC Glucose 105 mg/dl             CTA head and neck with and without contrast   Final Interpretation by Qamar Newton MD (05/24 1851)         1. No evidence of acute infarct,  intracranial hemorrhage or mass.   2. No hemodynamically significant stenosis, dissection or occlusion of the carotid or vertebral arteries or major vessels of the [place].                  Workstation performed: [id number]         XR chest 1 view portable   ED Interpretation by Yvonne Dunn MD (05/24 1706)   NAD          ECG 12 Lead Documentation Only    Date/Time: 5/24/2025 10:59 PM    Performed by: Yvonne Dunn MD  Authorized by: Yvonne Dunn MD    Indications / Diagnosis:  Confusion  ECG reviewed by me, the ED Provider: yes    Patient location:  ED  Rate:     ECG rate:  70    ECG rate assessment: normal    Rhythm:     Rhythm: sinus rhythm    T waves:     T waves: non-specific        ED Medication and Procedure Management   Prior to Admission Medications   Prescriptions Last Dose Informant Patient Reported? Taking?   PARoxetine (PAXIL) 30 mg tablet   No No   Sig: TAKE 1 TABLET(30 MG) BY MOUTH EVERY MORNING   atorvastatin (LIPITOR) 40 mg tablet   No No   Sig: Take 1 tablet (40 mg total) by mouth daily at bedtime   temazepam (RESTORIL) 30 mg capsule   No No   Sig: TAKE 1 CAPSULE(30 MG) BY MOUTH DAILY AT BEDTIME AS NEEDED FOR SLEEP      Facility-Administered Medications: None     Discharge Medication List as of 5/24/2025  8:28 PM        CONTINUE these medications which have NOT CHANGED    Details   atorvastatin (LIPITOR) 40 mg tablet Take 1 tablet (40 mg total) by mouth daily at bedtime, Starting Tue 3/18/2025, Normal      PARoxetine (PAXIL) 30 mg tablet TAKE 1 TABLET(30 MG) BY MOUTH EVERY MORNING, Normal      temazepam (RESTORIL) 30 mg capsule TAKE 1 CAPSULE(30 MG) BY MOUTH DAILY AT BEDTIME AS NEEDED FOR SLEEP, Normal           No discharge procedures on file.  ED SEPSIS DOCUMENTATION   Time reflects when diagnosis was documented in both MDM as applicable and the Disposition within this note       Time User Action Codes Description Comment    5/24/2025  8:27 PM Yvonne Dunn Add  [G47.00] Insomnia     5/24/2025  8:28 PM Yvonne Dunn Add [R41.0] Intermittent confusion                      [1]   Past Medical History:  Diagnosis Date    Back pain     Depression     Hyperlipemia     Hypertension     Insomnia    [2]   Past Surgical History:  Procedure Laterality Date    CHOLECYSTECTOMY      TUBAL LIGATION     [3]   Family History  Problem Relation Name Age of Onset    Cancer Mother Mommy elaine     Dementia Mother Mommy elaine     Heart disease Mother Momwaqas elaine     Vision loss Mother Mommy elaine     Glaucoma Mother Mommy elaine     Cancer Father Eues     Dementia Father Eues     Asthma Father Eues     Arthritis Father Eues     Diabetes Maternal Grandmother Letty     Breast cancer Maternal Grandmother Letty     Prostate cancer Paternal Grandfather Uncle     Alcohol abuse Paternal Uncle Family     Coronary artery disease Sister Elaine     COPD Sister Elaine     Heart disease Sister [place]     Colon cancer Son Gandalf     Glaucoma Brother Mr big    [4]   Social History  Tobacco Use    Smoking status: Never    Smokeless tobacco: Former    Tobacco comments:     None   Vaping Use    Vaping status: Never Used   Substance Use Topics    Alcohol use: Not Currently    Drug use: No        Yvonne Dunn MD  05/24/25 5113

## 2025-05-27 RX ORDER — TEMAZEPAM 30 MG/1
CAPSULE ORAL
Qty: 30 CAPSULE | Refills: 0 | Status: SHIPPED | OUTPATIENT
Start: 2025-05-27

## 2025-05-28 RX ORDER — TEMAZEPAM 30 MG/1
CAPSULE ORAL
Qty: 30 CAPSULE | Refills: 0 | OUTPATIENT
Start: 2025-05-28

## 2025-06-17 DIAGNOSIS — E78.2 MIXED HYPERLIPIDEMIA: ICD-10-CM

## 2025-06-17 RX ORDER — ATORVASTATIN CALCIUM 40 MG/1
40 TABLET, FILM COATED ORAL
Qty: 90 TABLET | Refills: 0 | Status: SHIPPED | OUTPATIENT
Start: 2025-06-17

## 2025-06-24 DIAGNOSIS — F51.05 INSOMNIA DUE TO OTHER MENTAL DISORDER: ICD-10-CM

## 2025-06-24 DIAGNOSIS — F99 INSOMNIA DUE TO OTHER MENTAL DISORDER: ICD-10-CM

## 2025-06-24 RX ORDER — TEMAZEPAM 30 MG/1
CAPSULE ORAL
Qty: 30 CAPSULE | Refills: 0 | Status: SHIPPED | OUTPATIENT
Start: 2025-06-24

## 2025-06-24 NOTE — TELEPHONE ENCOUNTER
Reason for call:   [x] Refill   [] Prior Auth  [] Other:     Office:   [x] PCP/Provider - [facility]   [] Specialty/Provider -     Medication: temazepam (RESTORIL) 30 mg capsule     Dose/Frequency: TAKE 1 CAPSULE(30 MG) BY MOUTH DAILY AT BEDTIME AS NEEDED FOR SLEEP,     Quantity: 30    [facility]: [id number]    Local Pharmacy   Does the patient have enough for 3 days?   [] Yes   [x] No - Send as HP to POD    Mail Away Pharmacy   Does the patient have enough for 10 days?   [] Yes   [] No - Send as HP to POD

## 2025-07-23 DIAGNOSIS — F51.05 INSOMNIA DUE TO OTHER MENTAL DISORDER: ICD-10-CM

## 2025-07-23 DIAGNOSIS — F99 INSOMNIA DUE TO OTHER MENTAL DISORDER: ICD-10-CM

## 2025-07-25 RX ORDER — TEMAZEPAM 30 MG/1
CAPSULE ORAL
Qty: 30 CAPSULE | Refills: 0 | Status: SHIPPED | OUTPATIENT
Start: 2025-07-25

## 2025-08-07 ENCOUNTER — OFFICE VISIT (OUTPATIENT)
Age: 75
End: 2025-08-07
Payer: COMMERCIAL

## 2025-08-07 VITALS
BODY MASS INDEX: 26.02 KG/M2 | OXYGEN SATURATION: 97 % | TEMPERATURE: 98.4 F | DIASTOLIC BLOOD PRESSURE: 66 MMHG | RESPIRATION RATE: 18 BRPM | HEART RATE: 82 BPM | WEIGHT: 165.8 LBS | SYSTOLIC BLOOD PRESSURE: 134 MMHG | HEIGHT: 67 IN

## 2025-08-07 DIAGNOSIS — Z00.00 MEDICARE ANNUAL WELLNESS VISIT, SUBSEQUENT: Primary | ICD-10-CM

## 2025-08-07 DIAGNOSIS — E78.00 HIGH CHOLESTEROL: ICD-10-CM

## 2025-08-07 DIAGNOSIS — R73.03 PREDIABETES: ICD-10-CM

## 2025-08-07 DIAGNOSIS — F51.05 INSOMNIA DUE TO OTHER MENTAL DISORDER: ICD-10-CM

## 2025-08-07 DIAGNOSIS — F99 INSOMNIA DUE TO OTHER MENTAL DISORDER: ICD-10-CM

## 2025-08-07 PROBLEM — T45.0X1A ANTIHISTAMINES OVERDOSE: Status: RESOLVED | Noted: 2024-03-29 | Resolved: 2025-08-07

## 2025-08-07 PROCEDURE — G0439 PPPS, SUBSEQ VISIT: HCPCS | Performed by: FAMILY MEDICINE

## 2025-08-07 PROCEDURE — 99214 OFFICE O/P EST MOD 30 MIN: CPT | Performed by: FAMILY MEDICINE

## 2025-08-25 PROBLEM — M54.50 CHRONIC RIGHT-SIDED LOW BACK PAIN WITHOUT SCIATICA: Status: ACTIVE | Noted: 2025-08-25

## 2025-08-25 PROBLEM — G89.29 CHRONIC RIGHT-SIDED LOW BACK PAIN WITHOUT SCIATICA: Status: ACTIVE | Noted: 2025-08-25
